# Patient Record
Sex: FEMALE | Race: WHITE | HISPANIC OR LATINO | Employment: FULL TIME | URBAN - METROPOLITAN AREA
[De-identification: names, ages, dates, MRNs, and addresses within clinical notes are randomized per-mention and may not be internally consistent; named-entity substitution may affect disease eponyms.]

---

## 2017-01-06 LAB — HBA1C MFR BLD HPLC: 5.7 %

## 2017-06-29 ENCOUNTER — GENERIC CONVERSION - ENCOUNTER (OUTPATIENT)
Dept: OTHER | Facility: OTHER | Age: 57
End: 2017-06-29

## 2017-09-19 ENCOUNTER — TRANSCRIBE ORDERS (OUTPATIENT)
Dept: ADMINISTRATIVE | Facility: HOSPITAL | Age: 57
End: 2017-09-19

## 2017-09-19 DIAGNOSIS — Z12.31 ENCOUNTER FOR SCREENING MAMMOGRAM FOR MALIGNANT NEOPLASM OF BREAST: ICD-10-CM

## 2017-09-19 DIAGNOSIS — Z12.31 VISIT FOR SCREENING MAMMOGRAM: Primary | ICD-10-CM

## 2017-09-19 LAB — HBA1C MFR BLD HPLC: 5.4 %

## 2017-11-06 ENCOUNTER — ALLSCRIPTS OFFICE VISIT (OUTPATIENT)
Dept: OTHER | Facility: OTHER | Age: 57
End: 2017-11-06

## 2017-11-06 DIAGNOSIS — Z12.31 ENCOUNTER FOR SCREENING MAMMOGRAM FOR MALIGNANT NEOPLASM OF BREAST: ICD-10-CM

## 2017-11-06 PROCEDURE — 87624 HPV HI-RISK TYP POOLED RSLT: CPT | Performed by: OBSTETRICS & GYNECOLOGY

## 2017-11-06 PROCEDURE — G0145 SCR C/V CYTO,THINLAYER,RESCR: HCPCS | Performed by: OBSTETRICS & GYNECOLOGY

## 2017-11-07 NOTE — PROGRESS NOTES
Assessment  1  Encounter for screening mammogram for malignant neoplasm of breast (V76 12)   (Z12 31)   2  Visit for routine gyn exam (V72 31) (Z01 419)   3  Encounter for Pap smear of cervix with HPV DNA cotesting (V76 2) (Z12 4)    Plan  Encounter for screening mammogram for malignant neoplasm of breast    · * MAMMO SCREENING BILATERAL W CAD; Status:Hold For - Scheduling; Requested  PWX:[id number];    Perform:[facility]; YVR:[id number]; Ordered;For:Encounter for screening mammogram for malignant neoplasm of breast; Ordered By:Franny Babb;    Discussion/Summary  HPV and Pap Co-testing Done Today Breast cancer screening: the risks and benefits of breast cancer screening were discussed, self breast exam technique was taught, monthly self breast exam was advised, mammogram is current and mammogram has been ordered  Colorectal cancer screening: the risks and benefits of colorectal cancer screening were discussed, colorectal cancer screening is current and colonoscopy is needed every ten years  Advice and education were given regarding aerobic exercise and weight bearing exercise  Regular menstrual cycles-I recommended that she keep a careful menstrual calendar, she should call with any midcycle bleeding, prolonged cycles, or shortened cycles  and HPV done today  reviewed with her and prescription given for next week  The patient has the current Goals: See discussion  The patent has the current Barriers: None  Patient is able to Self-Care  Chief Complaint  1  Visit For: Preventive General Multisystem Exam    History of Present Illness  HPI: Patient here for yearly, she had a normal mammogram in July of 2016, is due for mammogram now, this is scheduled for next week, she is due for a Pap, she had a normal Pap and negative HPV in November of 2012  menses, she bleeds for 5-7 days, her flow has been the same  No hot flashes  just became a grandmother on Friday   GYN HM, Adult Female Salbador Brooms:  The patient is being seen for a health maintenance evaluation  The last health maintenance visit was 1 year(s) ago  General Health: The patient's health since the last visit is described as good  Lifestyle:  She has weight concerns  -- She does not exercise regularly  -- She does not use tobacco    Reproductive health: the patient is premenopausal--   she reports no menstrual problems  -- she is not sexually active  -- pregnancy history: P 1    Screening:      Review of Systems    Constitutional: No fever, no chills, feels well, no tiredness, no recent weight gain or loss  ENT: no ear ache, no loss of hearing, no nosebleeds or nasal discharge, no sore throat or hoarseness  Cardiovascular: no complaints of slow or fast heart rate, no chest pain, no palpitations, no leg claudication or lower extremity edema  Respiratory: no complaints of shortness of breath, no wheezing, no dyspnea on exertion, no orthopnea or PND  Breasts: no complaints of breast pain, breast lump or nipple discharge  Gastrointestinal: no complaints of abdominal pain, no constipation, no nausea or diarrhea, no vomiting, no bloody stools  Genitourinary: no complaints of dysuria, no incontinence, no pelvic pain, no dysmenorrhea, no vaginal discharge or abnormal vaginal bleeding  Musculoskeletal: no complaints of arthralgia, no myalgia, no joint swelling or stiffness, no limb pain or swelling  Integumentary: no complaints of skin rash or lesion, no itching or dry skin, no skin wounds  Neurological: no complaints of headache, no confusion, no numbness or tingling, no dizziness or fainting  Active Problems  1  Abnormal neurological exam (781 99) (R29 90)   2  Carpal tunnel syndrome of right wrist (354 0) (G56 01)   3  Colonoscopy (Fiberoptic) Screening   4  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)   5  Contracture of right hamstring (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 89) (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)   6  Diabetes mellitus type II, controlled (250 00) (E11 9)   7   Encounter for screening mammogram for malignant neoplasm of breast (6 12)   (Z12 31)   8  Essential hypertriglyceridemia (272 1) (E78 1)   9  Exposure to hepatitis C (V01 79) (Z20 5)   10  Flu vaccine need (V04 81) (Z23)   11  History of hypertension (V12 59) (Z86 79)   12  History of hypothyroidism (V12 29) (Z86 39)   13  Hypovitaminosis D (268 9) (E55 9)   14  Low back pain with sciatica (724 3) (M54 40)   15  Low back strain (847 2) (S39 012A)   16  Need for 23-polyvalent pneumococcal polysaccharide vaccine (V03 82) (Z23)   17  Need for diphtheria-tetanus-pertussis (Tdap) vaccine, adult/adolescent (V06 1) (Z23)   18  Denied: History of Patient Education - Self-Examination Of Breasts   19  Right hand paresthesia (782 0) (R20 2)   20  Sacroiliitis (720 2) (M46 1)   21  Screening mammogram, encounter for (6 12) (Z12 31)   22  Sore muscles (729 1) (M79 1)   23  Strain of iliopsoas muscle, left, initial encounter (843 8) (X56 039D)   24  Strain of iliopsoas muscle, right, initial encounter (843 8) (S76 911A)   25  Visit for routine gyn exam ( 31) (Z01 419)   26  Work related injury (959 9) (Y99 0)    Past Medical History   · History of Anxiety (300 00) (F41 9)   · History of Birth History   · History of asthma (V12 69) (Z87 09)   · History of diabetes mellitus (V12 29) (Z86 39)   · History of hypertension (V12 59) (Z86 79)   · History of psoriasis (V13 3) (Z87 2)   · History of thyroid disease (V12 29) (Z86 39)   · History of varicose veins (V12 59) (Z86 79)   · History of Otitis media, left (382 9) (H66 92)   · History of Phlebitis of leg (451 2) (I80 3)   · History of Thyroid trouble (246 9) (E07 9)    The active problems and past medical history were reviewed and updated today        Surgical History   · History of  Section Low Transverse   · History of Complete Colonoscopy   · History of Gallbladder Surgery   · History of Laparoscopy With Excision Of Ectopic Pregnancy   · Denied: History of Patient Education - Self-Examination Of Breasts   · History of Thyroid Surgery    The surgical history was reviewed and updated today  Family History  Mother    · Family history of Asthma (V17 5)  Father    · Family history of Diabetes Mellitus (V18 0)   · Family history of Hypertension (V17 49)  Family History    · Family history of hypertension (V17 49) (Z82 49)   · Family history of neuropathy (V17 2) (Z82 0)   · Family history of stroke (V17 1) (Z82 3)   · Family history of thyroid disease (V18 19) (Z83 49)    The family history was reviewed and updated today  Social History   · Being A Social Drinker   · Caffeine Use   · Current every day smoker (305 1) (F17 200)   · Marital History -    · No drug use   · Occupation   · worked for [employer] for 19 years  Prior back injury 1999, treated with LESI and RTW  · One child   · [address] ([place] [address])   · Uses [address]  The social history was reviewed and updated today  Current Meds   1  Lisinopril 20 MG Oral Tablet; take 1 tablet by mouth once daily; Therapy: 57YGV6167 to (Evaluate:06Mcn6645) Recorded   2  MetFORMIN HCl - 500 MG Oral Tablet; 1 tab po tid; Therapy: 32KHP5036 to Recorded   3  Unithroid 150 MCG Oral Tablet; TAKE 1 TABLET DAILY; Therapy: 64FPP7533 to (Evaluate:27Jun2014) Recorded   4  Vitamin D 1000 UNIT Oral Tablet; 1 tab po q day; Therapy: (Recorded:01Jun2015) to Recorded    Allergies  1  Tapazole TABS    Vitals   Recorded: 48YZZ3059 32:29IC   Systolic 677, LUE, Sitting   Diastolic 78, LUE, Sitting   Height 5 ft 1 in   Weight 184 lb 8 oz   BMI Calculated 34 86   BSA Calculated 1 83   LMP POSTMENOPAUSAL     Physical Exam    Constitutional   General appearance: No acute distress, well appearing and well nourished  Neck   Thyroid: Normal, no thyromegaly  Pulmonary   Auscultation of lungs: Clear to auscultation      Cardiovascular   Auscultation of heart: Normal rate and rhythm, normal S1 and S2, no murmurs  Genitourinary   External genitalia: Normal and no lesions appreciated  Vagina: Normal, no lesions or dryness appreciated  Urethra: Normal     Urethral meatus: Normal     Bladder: Normal, soft, non-tender and no prolapse or masses appreciated  Cervix: Normal, no palpable masses  Uterus: Normal, non-tender, not enlarged, and no palpable masses  Adnexa/parametria: Normal, non-tender and no fullness or masses appreciated  Anus, perineum, and rectum: Normal sphincter tone, no masses, and no prolapse  Chest   Breasts: Normal and no dimpling or skin changes noted  Abdomen   Abdomen: Normal, non-tender, and no organomegaly noted  Liver and spleen: No hepatomegaly or splenomegaly  Examination for hernias: No hernias appreciated  Psychiatric   Orientation to person, place, and time: Normal     Mood and affect: Normal        Signatures   Electronically signed by :  Pierre Isaacs DO; Nov 6 2017  1:43PM EST                       (Author)

## 2017-11-08 ENCOUNTER — LAB REQUISITION (OUTPATIENT)
Dept: LAB | Facility: HOSPITAL | Age: 57
End: 2017-11-08
Payer: COMMERCIAL

## 2017-11-08 DIAGNOSIS — Z12.4 ENCOUNTER FOR SCREENING FOR MALIGNANT NEOPLASM OF CERVIX: ICD-10-CM

## 2017-11-08 DIAGNOSIS — Z11.51 ENCOUNTER FOR SCREENING FOR HUMAN PAPILLOMAVIRUS (HPV): ICD-10-CM

## 2017-11-13 LAB — HPV RRNA GENITAL QL NAA+PROBE: NORMAL

## 2017-11-15 LAB
LAB AP GYN PRIMARY INTERPRETATION: NORMAL
Lab: NORMAL
PATH INTERP SPEC-IMP: NORMAL

## 2017-12-18 ENCOUNTER — ALLSCRIPTS OFFICE VISIT (OUTPATIENT)
Dept: OTHER | Facility: OTHER | Age: 57
End: 2017-12-18

## 2017-12-19 NOTE — PROGRESS NOTES
Assessment  1  Acute bacterial bronchitis (466 0,041 9) (J20 8,B96 89)   2  Diabetes mellitus type II, controlled (250 00) (E11 9)   3  Nicotine abuse (305 1) (Z72 0)    Plan  Acute bacterial bronchitis    · Amoxicillin-Pot Clavulanate 875-125 MG Oral Tablet; 1 tab PO BID x 7 days   · Promethazine-Codeine 6 25-10 MG/5ML Oral Syrup; 1 tsp every 8 hours as neededfor cough  Essential hypertriglyceridemia    · Fenofibrate Micronized 200 MG Oral Capsule; 1 tab po q day  Nicotine abuse    · Chantix Starting Month Latrell 0 5 MG X 11 & 1 MG X 42 Oral Tablet; TAKE ASDIRECTED PER PACKAGE INSTRUCTIONS   · Dulera 100-5 MCG/ACT Inhalation Aerosol; inhale 1 puffs,  by mouth, twice daily    Discussion/Summary    Acute bacterial bronchitis - start Augmentin bid x 7 days, Promethazine with codeine will also be called in, [facility] sample given and urged to rinse mouth after use, urged to con't decongestant/lozenges/rest/fluids/gargles, d/w pt that cough can last 4 wks but to call with new/worsening symptoms or if not better at all by end of apptDM type 2 - will get records and recent BW from previous PCP before she re-establishes her in Jan - con't current meds for nowNicotine abuse - try a trial of Chantix  The patient was counseled regarding instructions for management,-- risk factor reductions,-- prognosis,-- patient and family education,-- impressions,-- risks and benefits of treatment options,-- importance of compliance with treatment  Chief Complaint  sick visit      History of Present Illness  HPI: Pt here with 5 days of cold symptoms  Symptoms started with congestion/ST/cough  She notes some chills but has not checked her temp  She has had SOB/wheezing with the cough and is having a hard time sleeping d/t the cough  She notes no ear pain but has had sinus pain and pressure and a lot of post nasal drip  She notes some diarrhea but no N/V/rashes  She is having incontinence to urine with coughing   She has tried Nyquil/Evelina [place] Cold and Vicks Vapor rub  She has had sick contacts  She is DM but does not check her sugars at home  She has been following with a different PCP since her insurance changed  She has a new insurance again and is going to re-establish with us next month  She is still taking Metformin as directed  She is still smoking and smokes about 7-8 cig a day  She has not been smoking as much recently  She wants to quit and has only tried cold turkey - successful for 8 yrs  She has currently been smoking again since 2014  Review of Systems   Constitutional: feeling poorly,-- chills-- and-- feeling tired, but-- no fever  ENT: sore throat-- and-- nasal discharge, but-- no earache  Cardiovascular: no chest pain-- and-- no palpitations  Respiratory: shortness of breath,-- cough-- and-- wheezing  Gastrointestinal: no nausea,-- no vomiting-- and-- no diarrhea  Genitourinary: incontinence, but-- no dysuria  Integumentary: no rashes  Neurological: headache, but-- no dizziness  Active Problems  1  Abnormal neurological exam (781 99) (R29 90)   2  Carpal tunnel syndrome of right wrist (354 0) (G56 01)   3  Cervical cancer screening (V76 2) (Z12 4)   4  Cervical cancer screening (V76 2) (Z12 4)   5  Colonoscopy (Fiberoptic) Screening   6  Diabetes mellitus type II, controlled (250 00) (E11 9)   7  Encounter for Pap smear of cervix with HPV DNA cotesting (V76 2) (Z12 4)   8  Encounter for screening mammogram for malignant neoplasm of breast (V76 12) (Z12 31)   9  Essential hypertriglyceridemia (272 1) (E78 1)   10  Exposure to hepatitis C (V01 79) (Z20 5)   11  Flu vaccine need (V04 81) (Z23)   12  History of hypertension (V12 59) (Z86 79)   13  History of hypothyroidism (V12 29) (Z86 39)   14  Hypovitaminosis D (268 9) (E55 9)   15  Low back pain with sciatica (724 3) (M54 40)   16  Denied: History of Patient Education - Self-Examination Of Breasts   17  Right hand paresthesia (782 0) (R20 2)   18   Sacroiliitis (720 2) (M46 1)   19  Screening for HPV (human papillomavirus) (V73 81) (Z11 51)   20  Screening mammogram, encounter for (V76 12) (Z12 31)   21  Visit for routine gyn exam (V72 31) (Z01 419)    Past Medical History  Active Problems And Past Medical History Reviewed: The active problems and past medical history were reviewed and updated today  Surgical History  Surgical History Reviewed: The surgical history was reviewed and updated today  Social History   · Being A Social Drinker   · Caffeine Use   · Current every day smoker (305 1) (F17 200)   · Marital History -    · No drug use   · Occupation   · worked for [employer] for 19 years  Prior back injury 1999, treated with LESI and RTW  · One child   · [address] ([place]   · [address]ses [address]  The social history was reviewed and updated today  Family History  Family History Reviewed: The family history was reviewed and updated today  Current Meds   1  Levothyroxine Sodium 200 MCG Oral Tablet; take 1 tablet by mouth every day; Therapy: 95EVR9782 to (Evaluate:02Vvq4470) Recorded   2  Lisinopril 20 MG Oral Tablet; take 1 tablet by mouth once daily; Therapy: 55ERN4758 to (Evaluate:05Pgu3476) Recorded   3  MetFORMIN HCl - 500 MG Oral Tablet; 1 tab po tid; Therapy: 09EMD4849 to Recorded   4  Unithroid 150 MCG Oral Tablet; TAKE 1 TABLET DAILY; Therapy: 29IMS1125 to (Evaluate:27Jun2014) Recorded   5  Vitamin D 1000 UNIT Oral Tablet; 1 tab po q day; Therapy: (Recorded:01Jun2015) to Recorded    The medication list was reviewed and updated today  Allergies  1  Tapazole TABS    Vitals   Recorded: 06ITM4566 02:54PM   Temperature 98 8 F   Heart Rate 88   Systolic 522   Diastolic 88   Height 5 ft 1 in   Weight 178 lb    BMI Calculated 33 63   BSA Calculated 1 8     Physical Exam   Constitutional  General appearance: No acute distress, well appearing and well nourished     Ears, Nose, Mouth, and Throat  External inspection of ears and nose: Normal    Otoscopic examination: Tympanic membranes translucent with normal light reflex  Canals patent without erythema  Oropharynx: Normal with no erythema, edema, exudate or lesions  Pulmonary  Respiratory effort: No increased work of breathing or signs of respiratory distress  Auscultation of lungs: Abnormal  -- diffuse wheezing and rhonchi L>R  Cardiovascular  Auscultation of heart: Normal rate and rhythm, normal S1 and S2, without murmurs  Abdomen  Abdomen: Non-tender, no masses  Lymphatic  Palpation of lymph nodes in neck: Abnormal  -- B/L ant cervical adenopathy    Psychiatric  Mood and affect: Normal          Future Appointments    Date/Time Provider Specialty Site   01/25/2018 04:00 PM Koby Cook DO Internal Medicine Pal Garcia MD     Signatures   Electronically signed by : Evelyn Gonzalez DO; Dec 18 2017  3:21PM EST                       (Author)

## 2018-01-02 ENCOUNTER — GENERIC CONVERSION - ENCOUNTER (OUTPATIENT)
Dept: OTHER | Facility: OTHER | Age: 58
End: 2018-01-02

## 2018-01-11 NOTE — PROGRESS NOTES
Assessment    1  Low back pain with sciatica (724 3) (M54 40)     Right wrist pain without trauma  Vit D deficiency may or may not be causing "slow to recover " status  Currently receiving mini-nebs for URI  Plan  Low back pain with sciatica    · Follow-up Visit in 4 Weeks Evaluation and Treatment  Follow-up  Status: Hold For -  Scheduling  Requested for: 26SDY6074  Low back pain with sciatica, Low back strain    · Cyclobenzaprine HCl - 5 MG Oral Tablet; TAKE 1 TABLET 3 TIMES DAILY AS  NEEDED    Discussion/Summary  Impression: low back pain and lumbar strain  Currently, the condition is unchanged  Medication changes are as documented in orders  do not start gabapentin Treatment plan includes physical therapy  Patient discussion: discussed with the patient  See your PCP for further care of your chest cold  See PCP for evaluation of right wrist pain  Chief Complaint  RUE numbness and tingling, and LBP, axial and buttock pain   consultation from Dr Pradeep Hoffman  Pt a no show 1/21/16    3/25 follow up for LBP      History of Present Illness  2nd visit   did not go to PT, did not start gabapentin   afraid of side effects   now has URI   w/c injury is low back   on further direct questioning right hand numbness occurred while of work  Last worked date of injury   reports lite duty not available   pattern today is diffuse lumbar with radiation into right buttock  3/25 finally had PT IE 3/22, yesterday sent home for severe URI   last PT was in January or February  Pain is right LB  New c/o right medial wrist pain   no injury, no numbness ( had reported numbness to Dr Pradeep Hoffman  New Hx is PT pt  fell onto buttocks going down stairs, reported it to 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)   5  Contracture of right hamstring (728 89) (M62 451)   6  Diabetes mellitus type II, controlled (250 00) (E11 9)   7  Encounter for screening mammogram for malignant neoplasm of breast (V76 12)   (Z12 31)   8  Essential hypertriglyceridemia (272 1) (E78 1)   9  Exposure to hepatitis C (V01 79) (Z20 5)   10  Flu vaccine need (V04 81) (Z23)   11  History of hypertension (V12 59) (Z86 79)   12  History of hypothyroidism (V12 29) (Z86 39)   13  Hypovitaminosis D (268 9) (E55 9)   14  Low back pain with sciatica (724 3) (M54 40)   15  Low back strain (847 2) (S39 012A)   16  Myalgia and myositis (729 1) (M79 1,M60 9)   17  Need for 23-polyvalent pneumococcal polysaccharide vaccine (V03 82) (Z23)   18  Need for diphtheria-tetanus-pertussis (Tdap) vaccine, adult/adolescent (V06 1) (Z23)   19  Denied: History of Patient Education - Self-Examination Of Breasts   20  Right hand paresthesia (782 0) (R20 2)   21  Sacroiliitis (720 2) (M46 1)   22  Strain of iliopsoas muscle, left, initial encounter (843 8) (S76 912A)   23  Strain of iliopsoas muscle, right, initial encounter (843 8) (S76 911A)   24  Visit for routine gyn exam (V72 31) (Z01 419)   25  Work related injury (959 9) (Y99 0)    Past Medical History    1  History of Anxiety (300 00) (F41 9)   2  History of Birth History   3  History of asthma (V12 69) (Z87 09)   4  History of diabetes mellitus (V12 29) (Z86 39)   5  History of hypertension (V12 59) (Z86 79)   6  History of psoriasis (V13 3) (Z87 2)   7  History of thyroid disease (V12 29) (Z86 39)   8  History of varicose veins (V12 59) (Z86 79)   9   History of Otitis media, left (382 9) (H66 92)   10  History of Phlebitis of leg (451 2) (I80 3)   11  History of Thyroid trouble (246 9) (E07 9)    The active problems and past medical history were reviewed and updated today  Surgical History    1  History of  Section Low Transverse   2  History of Complete Colonoscopy   3  History of Gallbladder Surgery   4  History of Laparoscopy With Excision Of Ectopic Pregnancy   5  Denied: History of Patient Education - Self-Examination Of Breasts   6  History of Thyroid Surgery    Family History    1  Family history of Asthma (V17 5)    2  Family history of Diabetes Mellitus (V18 0)   3  Family history of Hypertension (V17 49)    4  Family history of hypertension (V17 49) (Z82 49)   5  Family history of neuropathy (V17 2) (Z82 0)   6  Family history of stroke (V17 1) (Z82 3)   7  Family history of thyroid disease (V18 19) (Z80 46)    Social History    · Being A Social Drinker   · Caffeine Use   · Current every day smoker (305 1) (F17 200)   · Marital History -    · No drug use   · Occupation   · One child   · [address] ([address]   · Uses [employer] Equipment - [employer]    Current Meds   1  Ergocalciferol 99098 UNIT Oral Capsule; TAKE 1 CAPSULE WEEKLY; Therapy: 01LJK9887 to (Last Rx:39Cmv2575)  Requested for: 2016 Ordered   2  Flexeril TABS; Therapy: (Recorded:2016) to Recorded   3  Gabapentin 100 MG Oral Capsule; TAKE 1 CAPSULE  IN THE MORNING AND 2   CAPSULES IN THE EVENING; Therapy: 69RJR7588 to (Javid Mckinley)  Requested for: 02WMU9567; Last   Rx:2016 Ordered   4  Levoxyl 150 MCG Oral Tablet; Therapy: (Arvil Milder) to Recorded   5  Lisinopril 20 MG Oral Tablet; take 1 tablet by mouth once daily; Therapy: 39BOR5844 to (Evaluate:44Jwr5134) Recorded   6  Meloxicam 15 MG Oral Tablet; TAKE 1 TABLET DAILY AS NEEDED;    Therapy: 10Scy7634 to ([id number])  Requested for: 54RKO0020; Last   Rx:09Sep2015 Ordered   7  MetFORMIN HCl - 500 MG Oral Tablet; 1 tab po tid; Therapy: 07NRY0301 to Recorded   8  OxyCODONE HCl TABS; Therapy: (Brian Redd) to Recorded   9  PredniSONE 5 MG Oral Tablet; Take as directed according to titration schedule given; Therapy: 00LUL8276 to (Evaluate:04Jan2016)  Requested for: 29Dec2015; Last   Rx:29Dec2015 Ordered   10  Unithroid 150 MCG Oral Tablet; TAKE 1 TABLET DAILY; Therapy: 19OPY4023 to (Evaluate:27Jun2014) Recorded   11  Vitamin D 1000 UNIT Oral Tablet; 1 tab po q day; Therapy: (Recorded:01Jun2015) to Recorded    Allergies    1  Tapazole TABS    Vitals  Signs [Data Includes: Current Encounter]   Recorded: S006601 11:33AM   Heart Rate: 72  Systolic: 423  Diastolic: 82  Height: 5 ft 3 in  Weight: 171 lb   BMI Calculated: 30 29  BSA Calculated: 1 81    Physical Exam        Lumbar/Sacral Spine examination demonstrates  uses UE to help transfer but I  reflexes intact and symmetric, + PPT right SI and upper gluteals, lower lumbar PVM  Results/Data    Diagnostic Review PT note reviewed from yesterday they found palpatory tenderness and impaired ROM        Future Appointments    Date/Time Provider Specialty Site   04/25/2016 09:30 AM Gisell Wallace DO Obstetrics/Gynecology [facility]   06/02/2016 04:00 PM Thomas Cardona DO Internal Medicine Tiffanie Babb MD     Signatures   Electronically signed by : Tim Ulloa DO; Mar 25 2016 11:58AM EST                       (Author)

## 2018-01-12 NOTE — MISCELLANEOUS
Provider Comments  Provider Comments:   No show for 40 min IE        Signatures   Electronically signed by : Yary Michaels DO; Jan 21 2016 11:19AM EST                       (Author)

## 2018-01-13 VITALS
WEIGHT: 184.5 LBS | DIASTOLIC BLOOD PRESSURE: 78 MMHG | HEIGHT: 61 IN | SYSTOLIC BLOOD PRESSURE: 124 MMHG | BODY MASS INDEX: 34.83 KG/M2

## 2018-01-14 NOTE — MISCELLANEOUS
Message  Return to work or school:   [address] Tom Mcknight is under my professional care  She was seen in my office on 4/22   She is able to return to work on  as scheduled      Same guidelines as outlined on 4/11/16 report, advise keep hours to usual 8 hours actualy working  Signatures   Electronically signed by : Kelli Jason DO;  Apr 22 2016 12:26PM EST                       (Author)

## 2018-01-15 NOTE — RESULT NOTES
Verified Results  (1) C-REACTIVE PROTEIN [id number] 03:03PM Abel Davila Order Number: [id number]     Test Name Result Flag Reference   C-REACT PROTEIN <3 0 mg/L  <3 0     (1) VITAMIN D 25-HYDROXY 49OCG0124 03:03PM [facility] Order Number: [id number]     Order Number: [id number]     Test Name Result Flag Reference   VIT D 25-HYDROX 20 5 ng/mL L 30 0-100 0

## 2018-01-15 NOTE — RESULT NOTES
Verified Results  (1) CK (CPK) [id number] 03:03PM Ron Bang Order Number: [id number]     Test Name Result Flag Reference   CK (CPK) 54 U/L

## 2018-01-16 NOTE — RESULT NOTES
Verified Results  (1) VITAMIN D 25-HYDROXY 27Apr2016 02:14PM Lucila CISNEROS Order Number: [id number]    TW Order Number: [id number]     Test Name Result Flag Reference   VIT D 25-HYDROX 37 7 ng/mL  30 0-100 0

## 2018-01-16 NOTE — RESULT NOTES
Verified Results  (1) CYCLIC CITRULLINATED PEPTIDE [id number] 03:03PM Tex Whittaker Order Number: [id number]    Performed at:  [address]  [id number]  : Melissa Jones MD, Phone:  [phone]     Test Name Result Flag Reference   CYCLIC CITRULLINATED PEPTIDE 11 units  0 - 19   Negative               <20                          Weak positive      20 - 39                          Moderate positive  40 - 59                          Strong positive        >59

## 2018-01-16 NOTE — PROGRESS NOTES
Assessment    1  Low back strain (847 2) (S39 012A)   2  Hypovitaminosis D (268 9) (E55 9)    Plan  Hypovitaminosis D, Myalgia and myositis    · Ergocalciferol 09585 UNIT Oral Capsule; TAKE 1 CAPSULE WEEKLY  Low back pain with sciatica, Low back strain    · Follow-up Visit in 4 Weeks Evaluation and Treatment  Follow-up  Status: Hold For -  Scheduling  Requested for: 93Wkz7868    Discussion/Summary  Impression: lumbar strain and slow to recover  Currently, the condition is unchanged  Medication changes are as documented in orders  workman's comp will likely NOT cover Vit D DO NOT start gabapentin at this time  Treatment plan includes physical therapy  Patient discussion: discussed with the patient  Schedule Physical Therapy as soon as you are over your cold  Chief Complaint  RUE numbness and tingling, and LBP, axial and buttock pain   consultation from Dr Neeta Mi  Pt a no show 1/21/16  History of Present Illness  2nd visit   did not go to PT, did not start gabapentin   afraid of side effects   now has URI   w/c injury is low back   on further direct questioning right hand numbness occurred while of work  Last worked date of injury   reports lite duty not available   pattern today is diffuse lumbar with radiation into right buttock  Active Problems    1  Abnormal neurological exam (781 99) (R29 90)   2  Carpal tunnel syndrome of right wrist (354 0) (G56 01)   3  Colonoscopy (Fiberoptic) Screening   4  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)   5  Contracture of right hamstring (728 89) (M62 451)   6  Diabetes mellitus type II, controlled (250 00) (E11 9)   7  Encounter for screening mammogram for malignant neoplasm of breast (V76 12)   (Z12 31)   8  Essential hypertriglyceridemia (272 1) (E78 1)   9  Exposure to hepatitis C (V01 79) (Z20 5)   10  Flu vaccine need (V04 81) (Z23)   11  History of hypertension (V12 59) (Z86 79)   12  History of hypothyroidism (V12 29) (Z86 39)   13   Low back pain with sciatica (724 3) (M54 40)   14  Low back strain (847 2) (S39 012A)   15  Myalgia and myositis (729 1) (M79 1,M60 9)   16  Need for 23-polyvalent pneumococcal polysaccharide vaccine (V03 82) (Z23)   17  Need for diphtheria-tetanus-pertussis (Tdap) vaccine, adult/adolescent (V06 1) (Z23)   18  Denied: History of Patient Education - Self-Examination Of Breasts   19  Right hand paresthesia (782 0) (R20 2)   20  Sacroiliitis (720 2) (M46 1)   21  Strain of iliopsoas muscle, left, initial encounter (843 8) (S76 912A)   22  Strain of iliopsoas muscle, right, initial encounter (843 8) (S76 911A)   23  Visit for routine gyn exam (V72 31) (Z01 419)   24  Work related injury (959 9) (Y99 0)    Past Medical History    1  History of Anxiety (300 00) (F41 9)   2  History of Birth History   3  History of asthma (V12 69) (Z87 09)   4  History of diabetes mellitus (V12 29) (Z86 39)   5  History of hypertension (V12 59) (Z86 79)   6  History of psoriasis (V13 3) (Z87 2)   7  History of thyroid disease (V12 29) (Z86 39)   8  History of varicose veins (V12 59) (Z86 79)   9  History of Otitis media, left (382 9) (H66 92)   10  History of Phlebitis of leg (451 2) (I80 3)   11  History of Thyroid trouble (246 9) (E07 9)    Surgical History    1  History of  Section Low Transverse   2  History of Complete Colonoscopy   3  History of Gallbladder Surgery   4  History of Laparoscopy With Excision Of Ectopic Pregnancy   5  Denied: History of Patient Education - Self-Examination Of Breasts   6  History of Thyroid Surgery    The surgical history was reviewed and updated today  Family History    1  Family history of Asthma (V17 5)    2  Family history of Diabetes Mellitus (V18 0)   3  Family history of Hypertension (V17 49)    4  Family history of hypertension (V17 49) (Z82 49)   5  Family history of neuropathy (V17 2) (Z82 0)   6  Family history of stroke (V17 1) (Z82 3)   7   Family history of thyroid disease (V18 19) (Z83 49)    The family history was reviewed and updated today  Social History    · Being A Social Drinker   · Caffeine Use   · Current every day smoker (305 1) (F17 200)   · Marital History -    · No drug use   · Occupation   · One child   · [address] ([place] [address])   · [address]  The social history was reviewed and is unchanged  Current Meds   1  Flexeril TABS; Therapy: (Recorded:29Jan2016) to Recorded   2  Gabapentin 100 MG Oral Capsule; TAKE 1 CAPSULE  IN THE MORNING AND 2   CAPSULES IN THE EVENING; Therapy: 83IYG2651 to (Chelsea Boxer)  Requested for: 63JKJ1309; Last   Rx:29Jan2016 Ordered   3  Levoxyl 150 MCG Oral Tablet; Therapy: (Cleophus Fuel) to Recorded   4  Lisinopril 20 MG Oral Tablet; take 1 tablet by mouth once daily; Therapy: 30NUG1350 to (Evaluate:05Feb2015) Recorded   5  Meloxicam 15 MG Oral Tablet; TAKE 1 TABLET DAILY AS NEEDED; Therapy: 02Sep2015 to (Delia Orona)  Requested for: 85RFO7444; Last   Rx:09Sep2015 Ordered   6  MetFORMIN HCl - 500 MG Oral Tablet; 1 tab po tid; Therapy: 59UBR8771 to Recorded   7  OxyCODONE HCl TABS; Therapy: (Cleophus Fuel) to Recorded   8  PredniSONE 5 MG Oral Tablet; Take as directed according to titration schedule given; Therapy: 57DNP0788 to (Evaluate:04Jan2016)  Requested for: 84Hqd2496; Last   Rx:35Jbj0790 Ordered   9  Unithroid 150 MCG Oral Tablet; TAKE 1 TABLET DAILY; Therapy: 97LZB7235 to (Evaluate:27Jun2014) Recorded   10  Vitamin D 1000 UNIT Oral Tablet; 1 tab po q day; Therapy: (Recorded:01Jun2015) to Recorded    The medication list was reviewed and updated today  Allergies    1   Tapazole TABS    Vitals  Signs [Data Includes: Current Encounter]   Recorded: [id number] 11:49AM   Heart Rate: 92  Systolic: 862  Diastolic: 94  Height: 5 ft 3 in  Weight: 171 lb   BMI Calculated: 30 29  BSA Calculated: 1 81    Physical Exam    Constitutional acutely ill w coryza and cough  Thoracic Spine examination demonstrates  reflexes equal but hypoactive  Kelsien Miquel Lafleur SLR neg on the left, weakly + on the right  Willy Lafleur I in transfers w/o focal or lateralizing weakness  + loss of lordosis  some palpatory tenderness of lumbar PVM  Results/Data    Diagnostic Review Vit D of 20 5 is ONLY abnormality found (pt  clearly presented as a possible DM with rash and myalgias) at first visit  Message    FARSHAD LIN is under my professional care  She was seen in my office on 2/26    She is not able to return to work until re-evaluation at next four week appointment             Future Appointments    Date/Time Provider Specialty Site   03/25/2016 11:20 AM Betina Meadows  [facility] [address] [place] [address]   06/02/2016 04:00 PM Stephanie Zaragoza DO Internal Medicine Vincent Hicks MD     Signatures   Electronically signed by : Rangel Toribio DO; Feb 26 2016  3:16PM EST                       (Author)

## 2018-01-17 NOTE — RESULT NOTES
Verified Results  (1) RHEUMATOID FACTOR SCREEN 72QRN8019 03:03PM [employer] Order Number: [id number]     Test Name Result Flag Reference   RHEUMATOID FACTOR Negative  Negative     (1) LYME ANTIBODY PROFILE W/REFLEX TO WESTERN BLOT 05PPL7189 03:03PM [employer] Order Number: [id number]     Test Name Result Flag Reference   LYME IGG 0 40  0 00-0 79   NEGATIVE(0 00-0 79)-Absence of detectable Borrelia IgG Antibodies  A negative result does not exclude the possibility of Borrelia infection  If early Lyme disease is suspected,a second sample should be collected & tested 4 weeks after initial testing  LYME IGM 0 20  0 00-0 79   NEGATIVE (0 00-0 79)-Absence of detectable Borrelia IgM antibodies  A negative result does not exclude the possibility of Borrelia infection  If early lyme disease is suspected, a second sample should be collected & tested 4 weeks after initial testing  (1) FARSHAD SCREEN, (INC  PATTERN IF INDICATED) 60YUI2990 03:03PM [employer] Order Number: [id number]     Test Name Result Flag Reference   FARSHAD SCREEN   Negative  Negative     (1) ALDOLASE 96NPA4588 03:03PM [employer] Order Number: [id number]    Performed at:  [address] [place] [address]  : Rosita Benton MD, Phone:  [phone]     Test Name Result Flag Reference   ALDOLASE 5 4 U/L  3 3 - 10 3

## 2018-01-18 NOTE — RESULT NOTES
Verified Results  (1) SED RATE 63RMM2801 03:37PM [facility]     Test Name Result Flag Reference   SED RATE 15 mm/hour  0-15

## 2018-01-22 VITALS
HEART RATE: 88 BPM | BODY MASS INDEX: 33.61 KG/M2 | TEMPERATURE: 98.8 F | HEIGHT: 61 IN | SYSTOLIC BLOOD PRESSURE: 148 MMHG | DIASTOLIC BLOOD PRESSURE: 88 MMHG | WEIGHT: 178 LBS

## 2018-01-23 NOTE — MISCELLANEOUS
Message   Recorded as Task   Date: 01/02/2018 02:14 PM, Created By: Jelena Scott   Task Name: Medical Complaint Callback   Assigned To: [facility],Team   Regarding Patient: RILEY, [address], Status: Active   Comment:    Jelena Scott - 02 Jan 2018 2:14 PM     TASK CREATED  Patient seen with bronchitis a couple weeks ago  Now having symptoms of vomiting, diarrhea, chills, body aches, head ache  No fever  Please advise  Idalia Weir - 02 Jan 2018 3:14 PM     TASK REASSIGNED: Previously Assigned To [facility]  Please adviseKaushik - 02 Jan 2018 3:31 PM     TASK REPLIED TO: Previously Assigned To Lucy Ortiz viral or even flu like - if symptoms have been present for > 48 hrs little benefit of flu medication and treatment is supportive - tx the cough with cough med and fever/body aches and headache with Tylenol - if not better has to be seen   Yamilex Coleman - 02 Jan 2018 8:44 PM     TASK EDITED  Patient aware        Active Problems    1  Abnormal neurological exam (781 99) (R29 90)   2  Acute bacterial bronchitis (466 0,041 9) (J20 8,B96 89)   3  Carpal tunnel syndrome of right wrist (354 0) (G56 01)   4  Cervical cancer screening (V76 2) (Z12 4)   5  Cervical cancer screening (V76 2) (Z12 4)   6  Colonoscopy (Fiberoptic) Screening   7  Diabetes mellitus type II, controlled (250 00) (E11 9)   8  Encounter for Pap smear of cervix with HPV DNA cotesting (V76 2) (Z12 4)   9  Encounter for screening mammogram for malignant neoplasm of breast (V76 12)   (Z12 31)   10  Essential hypertriglyceridemia (272 1) (E78 1)   11  Exposure to hepatitis C (V01 79) (Z20 5)   12  Flu vaccine need (V04 81) (Z23)   13  History of hypertension (V12 59) (Z86 79)   14  History of hypothyroidism (V12 29) (Z86 39)   15  Hypovitaminosis D (268 9) (E55 9)   16  Low back pain with sciatica (724 3) (M54 40)   17  Nicotine abuse (305 1) (Z72 0)   18   Denied: History of Patient Education - Self-Examination Of Breasts   19  Right hand paresthesia (782 0) (R20 2)   20  Sacroiliitis (720 2) (M46 1)   21  Screening for HPV (human papillomavirus) (V73 81) (Z11 51)   22  Screening mammogram, encounter for (V76 12) (Z12 31)   23  Visit for routine gyn exam (V72 31) (Z01 419)    Current Meds   1  Chantix Starting Month Latrell 0 5 MG X 11 & 1 MG X 42 Oral Tablet; TAKE AS DIRECTED   PER PACKAGE INSTRUCTIONS; Therapy: 01QFH9302 to (Last Rx:62Ktg2115)  Requested for: 24XFS7060 Ordered   2  Dulera 100-5 MCG/ACT Inhalation Aerosol; inhale 1 puffs,  by mouth, twice daily; Therapy: 33BOY0225 to (Last Rx:19Odp6387) Ordered   3  Fenofibrate Micronized 200 MG Oral Capsule; 1 tab po q day; Therapy: 20BRW7041 to (Evaluate:96Tfa8542)  Requested for: 26UFH7297; Last   Rx:64Nqm4102 Ordered   4  Levothyroxine Sodium 200 MCG Oral Tablet; take 1 tablet by mouth every day; Therapy: 87IUX0296 to (Evaluate:29Rmy6750) Recorded   5  Lisinopril 20 MG Oral Tablet; take 1 tablet by mouth once daily; Therapy: 76PIK2833 to (Evaluate:63Pjm4589) Recorded   6  MetFORMIN HCl - 500 MG Oral Tablet; 1 tab po tid; Therapy: 16PKM5170 to Recorded   7  Promethazine-Codeine 6 25-10 MG/5ML Oral Syrup; 1 tsp every 8 hours as needed for   cough; Therapy: 67IXV1537 to (Last Rx:95Qdr8967) Ordered   8  Unithroid 150 MCG Oral Tablet; TAKE 1 TABLET DAILY; Therapy: 14WOG4721 to (Evaluate:27Jun2014) Recorded   9  Vitamin D 1000 UNIT Oral Tablet; 1 tab po q day; Therapy: (Recorded:01Jun2015) to Recorded    Allergies    1   Tapazole TABS    Signatures   Electronically signed by : Martha Corado DO; Srinivas  3 2018  9:20PM EST                       (Author)

## 2018-01-25 ENCOUNTER — OFFICE VISIT (OUTPATIENT)
Dept: FAMILY MEDICINE CLINIC | Facility: HOSPITAL | Age: 58
End: 2018-01-25
Payer: COMMERCIAL

## 2018-01-25 VITALS
SYSTOLIC BLOOD PRESSURE: 182 MMHG | DIASTOLIC BLOOD PRESSURE: 72 MMHG | BODY MASS INDEX: 31.71 KG/M2 | WEIGHT: 179 LBS | TEMPERATURE: 99.2 F | HEART RATE: 82 BPM | HEIGHT: 63 IN

## 2018-01-25 DIAGNOSIS — J06.9 VIRAL UPPER RESPIRATORY TRACT INFECTION: Primary | ICD-10-CM

## 2018-01-25 DIAGNOSIS — E11.9 TYPE 2 DIABETES MELLITUS WITHOUT COMPLICATION, WITHOUT LONG-TERM CURRENT USE OF INSULIN (HCC): ICD-10-CM

## 2018-01-25 DIAGNOSIS — L40.9 PSORIASIS: ICD-10-CM

## 2018-01-25 PROCEDURE — 99214 OFFICE O/P EST MOD 30 MIN: CPT | Performed by: INTERNAL MEDICINE

## 2018-01-25 RX ORDER — FENOFIBRATE 200 MG/1
1 CAPSULE ORAL DAILY
COMMUNITY
Start: 2017-12-18 | End: 2019-02-21 | Stop reason: SDUPTHER

## 2018-01-25 RX ORDER — LEVOTHYROXINE SODIUM 0.15 MG/1
1 TABLET ORAL DAILY
COMMUNITY
Start: 2014-05-05 | End: 2018-05-07 | Stop reason: DRUGHIGH

## 2018-01-25 RX ORDER — FLUTICASONE PROPIONATE 50 MCG
2 SPRAY, SUSPENSION (ML) NASAL DAILY
Qty: 16 G | Refills: 1 | Status: SHIPPED | OUTPATIENT
Start: 2018-01-25 | End: 2018-05-07 | Stop reason: ALTCHOICE

## 2018-01-25 RX ORDER — METFORMIN HYDROCHLORIDE 500 MG/1
1500 TABLET, EXTENDED RELEASE ORAL
Qty: 90 TABLET | Refills: 0
Start: 2018-01-25 | End: 2018-10-22 | Stop reason: SDUPTHER

## 2018-01-25 RX ORDER — LISINOPRIL 20 MG/1
1 TABLET ORAL DAILY
COMMUNITY
Start: 2015-01-06 | End: 2018-10-22 | Stop reason: SDUPTHER

## 2018-01-25 RX ORDER — LEVOTHYROXINE SODIUM 0.2 MG/1
1 TABLET ORAL DAILY
COMMUNITY
Start: 2017-12-18 | End: 2019-02-25 | Stop reason: SDUPTHER

## 2018-01-25 RX ORDER — VARENICLINE TARTRATE 25 MG
1 KIT ORAL SEE ADMIN INSTRUCTIONS
COMMUNITY
Start: 2017-12-18 | End: 2019-03-28

## 2018-01-25 NOTE — ASSESSMENT & PLAN NOTE
Pt is following with Endo and had BW and appt in Nov - was told everything was good and f/u in 6 mos - will try and get copy of Bw results and bring in to us - thinks she has in her car - if not she will get us name of Endo so we can get copy of results, will try and get copy of eye exam from eye doc on [address]

## 2018-01-25 NOTE — PROGRESS NOTES
Assessment/Plan:    Controlled type 2 diabetes mellitus without complication, without long-term current use of insulin (HCC)  Pt is following with Endo and had BW and appt in Nov - was told everything was good and f/u in 6 mos - will try and get copy of Bw results and bring in to us - thinks she has in her car - if not she will get us name of Endo so we can get copy of results, will try and get copy of eye exam from eye doc on [facility]    Psoriasis  No current tx as had had great success with clinical study med in the past - was on it for 5 yrs but now off of it and plaques returning, will give number for Derm    Essential hypertriglyceridemia  On Fenofibrate - had been stopped last year but restarted a few mos ago - tolerating med, need to get copy of BW results from Oct - con't current regimen for now, watch diet and keep active    Hypovitaminosis D  Tolerating current supplement, need to get copy of BW results from Oct       Diagnoses and all orders for this visit:    Viral upper respiratory tract infection  Comments:  Encouraged to start OTC decongestant and not Benadryl, will send rx for Flonase, Delsym or Robitussan recommended as was rest/fluids/lozenges/hot tea and gargle  Orders:  -     fluticasone (FLONASE) 50 mcg/act nasal spray; 2 sprays into each nostril daily    Type 2 diabetes mellitus without complication, without long-term current use of insulin (HCC)  -     metFORMIN (GLUCOPHAGE-XR) 500 mg 24 hr tablet; Take 3 tablets by mouth daily with breakfast          Subjective:      Patient ID: Ira Bueno is a 56 y o  female  HPI  Pt here for re-establishing care  She had to change PCP recently d/t insurance coverage but insurance has changed and she is coming back to us  Pt with known DM Type 2 without complications since 0580  She has been well controlled on Metformin 500 mg ER 3 tab PO q day - as per Dr Ryan Castellano in [place]    She states she had labs in Oct and she is not sure what her A1C was but she states the Endocrinologist was happy and no med changes were made  She is on ACE but no statin  She follows with eye doc on [facility] - she is not aware of any retinopathy - thinks eye appt was summer 2017  She had her feet checked with Endo in Oct - she notes no neuropathy/sores on her feet  She has h/o psoriasis and has chronic psoriatic plaques  She had been in a study but had no improvement with the med  She has not seen Derm recently and is wishing to see one again  She is taking her Vit D daily  She had Vit D checked in Oct and states they told her it was good  She had her cholesterol checked in Oct and has had her fenofibrate restarted early 2017  She notes no SE with the medication  Early Jan she started with chills but no fever, congestion, and dry cough  She was home from work for a few days and symptoms seemed to improve until yesterday  Yesterday she started to note congestion and hoarseness but no St/ear pain/F but still having chills  She notes no SOB/wheezing/N/V/urinary symptoms/rashes  She is having some sinus pain and pressure  She is currently using Benadryl  She has an order for mammo    She saw GYN in Nov    She has had a colonoscopy    Review of Systems   Constitutional: Positive for chills  Negative for fever  HENT: Positive for congestion, sinus pressure and voice change  Negative for ear pain, sore throat and trouble swallowing  Eyes: Negative for discharge  Respiratory: Positive for cough  Negative for shortness of breath and stridor  Cardiovascular: Positive for chest pain  Negative for palpitations and leg swelling  Gastrointestinal: Negative for abdominal pain, diarrhea and nausea  Endocrine: Negative for cold intolerance and heat intolerance  Genitourinary: Negative for difficulty urinating and flank pain  Musculoskeletal: Negative for back pain and joint swelling  Skin: Negative for rash          Psoriatic plaques present Neurological: Negative for dizziness and headaches  Hematological: Positive for adenopathy  Psychiatric/Behavioral: Negative for agitation and dysphoric mood  Objective:     Physical Exam   Constitutional: She appears well-developed and well-nourished  No distress  HENT:   Right Ear: External ear normal    Left Ear: External ear normal    Mouth/Throat: No oropharyngeal exudate  Eyes: Right eye exhibits no discharge  Left eye exhibits no discharge  No scleral icterus  Cardiovascular: Normal rate and regular rhythm  No murmur heard  Pulmonary/Chest: Effort normal and breath sounds normal  No respiratory distress  She has no wheezes  She has no rales  Abdominal: Soft  She exhibits no distension  There is no tenderness  There is no guarding  Musculoskeletal: She exhibits no edema  Lymphadenopathy:     She has cervical adenopathy  Skin: Skin is warm and dry  Psoriatic plaques present   Psychiatric: She has a normal mood and affect   Judgment normal

## 2018-01-25 NOTE — ASSESSMENT & PLAN NOTE
On Fenofibrate - had been stopped last year but restarted a few mos ago - tolerating med, need to get copy of BW results from Oct - con't current regimen for now, watch diet and keep active

## 2018-01-25 NOTE — ASSESSMENT & PLAN NOTE
No current tx as had had great success with clinical study med in the past - was on it for 5 yrs but now off of it and plaques returning, will give number for Derm

## 2018-01-29 ENCOUNTER — TELEPHONE (OUTPATIENT)
Dept: FAMILY MEDICINE CLINIC | Facility: HOSPITAL | Age: 58
End: 2018-01-29

## 2018-01-29 DIAGNOSIS — J01.90 ACUTE NON-RECURRENT SINUSITIS, UNSPECIFIED LOCATION: Primary | ICD-10-CM

## 2018-01-29 RX ORDER — AMOXICILLIN AND CLAVULANATE POTASSIUM 875; 125 MG/1; MG/1
1 TABLET, FILM COATED ORAL EVERY 12 HOURS SCHEDULED
Qty: 14 TABLET | Refills: 0 | Status: SHIPPED | OUTPATIENT
Start: 2018-01-29 | End: 2018-02-05

## 2018-01-29 NOTE — TELEPHONE ENCOUNTER
Patient seen last week and stated her cough is not any better  Told to call us if it doesn't improve  Please advise

## 2018-03-06 ENCOUNTER — OFFICE VISIT (OUTPATIENT)
Dept: URGENT CARE | Facility: CLINIC | Age: 58
End: 2018-03-06
Payer: COMMERCIAL

## 2018-03-06 VITALS
OXYGEN SATURATION: 97 % | HEIGHT: 63 IN | DIASTOLIC BLOOD PRESSURE: 80 MMHG | RESPIRATION RATE: 18 BRPM | HEART RATE: 92 BPM | BODY MASS INDEX: 31.18 KG/M2 | WEIGHT: 176 LBS | SYSTOLIC BLOOD PRESSURE: 140 MMHG | TEMPERATURE: 98.2 F

## 2018-03-06 DIAGNOSIS — R06.2 WHEEZING: Primary | ICD-10-CM

## 2018-03-06 PROBLEM — J45.41 MODERATE PERSISTENT ASTHMA WITH ACUTE EXACERBATION: Status: ACTIVE | Noted: 2018-03-06

## 2018-03-06 PROCEDURE — 99213 OFFICE O/P EST LOW 20 MIN: CPT | Performed by: EMERGENCY MEDICINE

## 2018-03-06 RX ORDER — PREDNISONE 20 MG/1
40 TABLET ORAL DAILY
Qty: 10 TABLET | Refills: 0 | Status: SHIPPED | OUTPATIENT
Start: 2018-03-06 | End: 2018-03-11

## 2018-03-06 RX ORDER — ALBUTEROL SULFATE 90 UG/1
2 AEROSOL, METERED RESPIRATORY (INHALATION) EVERY 6 HOURS PRN
Qty: 1 INHALER | Refills: 0 | Status: SHIPPED | OUTPATIENT
Start: 2018-03-06 | End: 2019-03-08

## 2018-03-06 RX ORDER — ALBUTEROL SULFATE 2.5 MG/3ML
2.5 SOLUTION RESPIRATORY (INHALATION) ONCE
Status: COMPLETED | OUTPATIENT
Start: 2018-03-06 | End: 2018-03-06

## 2018-03-06 RX ORDER — IPRATROPIUM BROMIDE AND ALBUTEROL SULFATE 2.5; .5 MG/3ML; MG/3ML
3 SOLUTION RESPIRATORY (INHALATION)
Status: DISCONTINUED | OUTPATIENT
Start: 2018-03-06 | End: 2018-05-07

## 2018-03-06 RX ORDER — AZITHROMYCIN 250 MG/1
TABLET, FILM COATED ORAL
Qty: 6 TABLET | Refills: 0 | Status: SHIPPED | OUTPATIENT
Start: 2018-03-06 | End: 2018-03-10

## 2018-03-06 RX ADMIN — IPRATROPIUM BROMIDE AND ALBUTEROL SULFATE 3 ML: 2.5; .5 SOLUTION RESPIRATORY (INHALATION) at 17:01

## 2018-03-06 RX ADMIN — ALBUTEROL SULFATE 2.5 MG: 2.5 SOLUTION RESPIRATORY (INHALATION) at 17:33

## 2018-03-06 NOTE — PROGRESS NOTES
Assessment/Plan:    No problem-specific Assessment & Plan notes found for this encounter  Diagnoses and all orders for this visit:    Wheezing  -     ipratropium-albuterol (DUO-NEB) 0 5-2 5 mg/3 mL inhalation solution 3 mL; Take 3 mL by nebulization every 6 (six) hours           Subjective: asthmatic bronchitis     Patient ID: Vernita Bernheim is a 56 y o  female  Coughing, wheezing SOB for 2 days, no fever, + smoker but none since Sunday      Wheezing    This is a new problem  The current episode started yesterday  The problem occurs constantly  The problem has been gradually worsening  Associated symptoms include chest pain, coughing and shortness of breath  Pertinent negatives include no fever  Nothing aggravates the symptoms  She has tried nothing for the symptoms  The treatment provided no relief  The following portions of the patient's history were reviewed and updated as appropriate: current medications, past family history, past medical history, past social history, past surgical history and problem list     Review of Systems   Constitutional: Negative for fever  Respiratory: Positive for cough, shortness of breath and wheezing  Cardiovascular: Positive for chest pain  All other systems reviewed and are negative  Objective:      /80   Pulse 92   Temp 98 2 °F (36 8 °C)   Resp 18   Ht 5' 3" (1 6 m)   Wt 79 8 kg (176 lb)   SpO2 97%   BMI 31 18 kg/m²          Physical Exam   Constitutional: She is oriented to person, place, and time  She appears well-developed and well-nourished  HENT:   Right Ear: External ear normal    Left Ear: External ear normal    Nose: Nose normal    Mouth/Throat: Oropharynx is clear and moist    Eyes: Pupils are equal, round, and reactive to light  Neck: Normal range of motion  Pulmonary/Chest: She is in respiratory distress (mild tachypnea, O2 sat 94 - 95%)  She has wheezes (both bases)  Abdominal: Soft   Bowel sounds are normal  Neurological: She is alert and oriented to person, place, and time  Skin: Skin is warm and dry  Psychiatric: She has a normal mood and affect  Her behavior is normal  Judgment and thought content normal    Nursing note and vitals reviewed

## 2018-03-06 NOTE — PATIENT INSTRUCTIONS
Prednisone once a day, Zithromax once a day, Use inhaler 4 x a day AND additionally as needed, Go to DME store at [facility] to get nebulizer and then get inhalation solution

## 2018-03-06 NOTE — LETTER
March 6, 2018     Patient: Yoselyn Collins   YOB: 1960   Date of Visit: 3/6/2018       To Whom It May Concern: It is my medical opinion that Javier Koch may return to work on 3/8/2018  If you have any questions or concerns, please don't hesitate to call           Sincerely,        Yohannes Jones MD    CC: No Recipients

## 2018-04-10 ENCOUNTER — HOSPITAL ENCOUNTER (OUTPATIENT)
Dept: BONE DENSITY | Facility: IMAGING CENTER | Age: 58
Discharge: HOME | End: 2018-04-10
Payer: COMMERCIAL

## 2018-04-10 DIAGNOSIS — Z12.31 ENCOUNTER FOR SCREENING MAMMOGRAM FOR MALIGNANT NEOPLASM OF BREAST: ICD-10-CM

## 2018-04-10 PROCEDURE — 77067 SCR MAMMO BI INCL CAD: CPT

## 2018-05-01 RX ORDER — ALBUTEROL SULFATE 2.5 MG/3ML
SOLUTION RESPIRATORY (INHALATION) 4 TIMES DAILY PRN
Refills: 0 | COMMUNITY
Start: 2018-03-07 | End: 2018-11-05 | Stop reason: SDUPTHER

## 2018-05-01 RX ORDER — IXEKIZUMAB 80 MG/ML
INJECTION, SOLUTION SUBCUTANEOUS
COMMUNITY
Start: 2018-04-24 | End: 2019-05-06

## 2018-05-07 ENCOUNTER — OFFICE VISIT (OUTPATIENT)
Dept: FAMILY MEDICINE CLINIC | Facility: HOSPITAL | Age: 58
End: 2018-05-07
Payer: COMMERCIAL

## 2018-05-07 VITALS
SYSTOLIC BLOOD PRESSURE: 122 MMHG | BODY MASS INDEX: 32.43 KG/M2 | DIASTOLIC BLOOD PRESSURE: 78 MMHG | WEIGHT: 183 LBS | TEMPERATURE: 97.9 F | HEART RATE: 80 BPM | HEIGHT: 63 IN

## 2018-05-07 DIAGNOSIS — E11.9 CONTROLLED TYPE 2 DIABETES MELLITUS WITHOUT COMPLICATION, WITHOUT LONG-TERM CURRENT USE OF INSULIN (HCC): Primary | ICD-10-CM

## 2018-05-07 DIAGNOSIS — H92.01 RIGHT EAR PAIN: ICD-10-CM

## 2018-05-07 DIAGNOSIS — L40.9 PSORIASIS: ICD-10-CM

## 2018-05-07 PROBLEM — E03.8 OTHER SPECIFIED HYPOTHYROIDISM: Status: ACTIVE | Noted: 2018-05-07

## 2018-05-07 PROBLEM — E07.9 THYROID DISEASE: Status: ACTIVE | Noted: 2018-05-07

## 2018-05-07 PROCEDURE — 99214 OFFICE O/P EST MOD 30 MIN: CPT | Performed by: INTERNAL MEDICINE

## 2018-05-07 PROCEDURE — 3008F BODY MASS INDEX DOCD: CPT | Performed by: INTERNAL MEDICINE

## 2018-05-07 NOTE — PROGRESS NOTES
Assessment/Plan:    Controlled type 2 diabetes mellitus without complication, without long-term current use of insulin ([address] )  Still no copy of BW results from Dr Mitzi Daily at [place] - will try and obtain, has Bw order for Oct, con't current meds, pt has eye appt for June 2018, she states she has foot exam with Endo every Oct - will get copy of results, on ACE    Psoriasis  Great improvement with Sameul Her - con't med as per Derm       Diagnoses and all orders for this visit:    Controlled type 2 diabetes mellitus without complication, without long-term current use of insulin ([address] )    Right ear pain  Comments:  Pt reassured exam nml, advised warm compresses and Tylenol, call with F/C/drainage/worsening or persistent pain    Psoriasis      Mammo done April 2018    GYN done Nov 2017    Elton 2014 - good for 5 yrs    Subjective:      Patient ID: Jeffy Hensley is a 56 y o  female  HPI Pt here for routine follow up appt    She was not able to get her BW results from her Endo from last year but she states she only does her labs once a year  She has not had her Metformin changed in some time  She is due for eye exam in June and will call and make appt (she follows with [facility])  She has foot exam with Endo every Oct (Dr Mitzi Daily in [place])  She has had R>L ear pain x 1-2 wks  She notes no other cold symptoms  She notes no drainage from the ears/decreased hearing/ringing in ears  She has not tried anything for the ear  She has seen Derm and was started on Taltz for her psoriasis  She is on the injections every 2 wks x 12 wks and then goes to once a month  She noted great improvement within days and notes no SE with medication  Had mammo in April    PAP done Nov 2017    Elton done 2014 - good for 5 yrs    Review of Systems   Constitutional: Negative for chills and fever  HENT: Negative for congestion and sore throat  Eyes: Negative for pain and discharge     Respiratory: Negative for cough, shortness of breath and wheezing  Cardiovascular: Negative for chest pain, palpitations and leg swelling  Gastrointestinal: Negative for abdominal pain, constipation, diarrhea and nausea  Endocrine: Negative for polydipsia and polyuria  Genitourinary: Negative for difficulty urinating and dysuria  Musculoskeletal: Negative for back pain and neck pain  Skin: Negative for rash and wound  Neurological: Negative for dizziness, syncope, light-headedness and headaches  Hematological: Negative for adenopathy  Psychiatric/Behavioral: Negative for behavioral problems and confusion  Objective:    /78   Pulse 80   Temp 97 9 °F (36 6 °C)   Ht 5' 3" (1 6 m)   Wt 83 kg (183 lb)   BMI 32 42 kg/m²      Physical Exam   Constitutional: She appears well-developed and well-nourished  No distress  HENT:   Head: Normocephalic and atraumatic  Right Ear: External ear normal    Left Ear: External ear normal    Mouth/Throat: Oropharynx is clear and moist  No oropharyngeal exudate  Eyes: Conjunctivae are normal  Right eye exhibits no discharge  Left eye exhibits no discharge  Neck: Neck supple  No tracheal deviation present  Cardiovascular: Normal rate, regular rhythm and normal heart sounds  Exam reveals no friction rub  No murmur heard  Pulmonary/Chest: Effort normal and breath sounds normal  No respiratory distress  She has no wheezes  She has no rales  Abdominal: Soft  She exhibits no distension  There is no tenderness  There is no guarding  Musculoskeletal: She exhibits no edema  Neurological: She is alert  She exhibits normal muscle tone  Skin: Skin is warm  No rash noted  Psychiatric: She has a normal mood and affect  Her behavior is normal    Nursing note and vitals reviewed

## 2018-05-07 NOTE — ASSESSMENT & PLAN NOTE
Still no copy of BW results from Dr Zina Ireland at [place] - will try and obtain, has Bw order for Oct, con't current meds, pt has eye appt for June 2018, she states she has foot exam with Endo every Oct - will get copy of results, on ACE

## 2018-06-18 LAB
LEFT EYE DIABETIC RETINOPATHY: NORMAL
LEFT EYE DIABETIC RETINOPATHY: NORMAL
RIGHT EYE DIABETIC RETINOPATHY: NORMAL
RIGHT EYE DIABETIC RETINOPATHY: NORMAL

## 2018-06-18 PROCEDURE — 3072F LOW RISK FOR RETINOPATHY: CPT | Performed by: INTERNAL MEDICINE

## 2018-10-22 DIAGNOSIS — E11.9 TYPE 2 DIABETES MELLITUS WITHOUT COMPLICATION, WITHOUT LONG-TERM CURRENT USE OF INSULIN (HCC): ICD-10-CM

## 2018-10-22 DIAGNOSIS — I10 ESSENTIAL HYPERTENSION: Primary | ICD-10-CM

## 2018-10-22 PROCEDURE — 4010F ACE/ARB THERAPY RXD/TAKEN: CPT | Performed by: INTERNAL MEDICINE

## 2018-10-22 RX ORDER — METFORMIN HYDROCHLORIDE 500 MG/1
1500 TABLET, EXTENDED RELEASE ORAL
Qty: 30 TABLET | Refills: 5 | Status: SHIPPED | OUTPATIENT
Start: 2018-10-22 | End: 2019-02-25 | Stop reason: SDUPTHER

## 2018-10-22 RX ORDER — LISINOPRIL 20 MG/1
20 TABLET ORAL DAILY
Qty: 30 TABLET | Refills: 5 | Status: SHIPPED | OUTPATIENT
Start: 2018-10-22 | End: 2019-02-25 | Stop reason: SDUPTHER

## 2018-11-05 ENCOUNTER — OFFICE VISIT (OUTPATIENT)
Dept: FAMILY MEDICINE CLINIC | Facility: HOSPITAL | Age: 58
End: 2018-11-05
Payer: COMMERCIAL

## 2018-11-05 VITALS
SYSTOLIC BLOOD PRESSURE: 132 MMHG | TEMPERATURE: 98.4 F | HEART RATE: 90 BPM | HEIGHT: 61 IN | DIASTOLIC BLOOD PRESSURE: 72 MMHG | BODY MASS INDEX: 35.57 KG/M2 | WEIGHT: 188.4 LBS

## 2018-11-05 DIAGNOSIS — R05.9 COUGH: ICD-10-CM

## 2018-11-05 DIAGNOSIS — E03.8 OTHER SPECIFIED HYPOTHYROIDISM: ICD-10-CM

## 2018-11-05 DIAGNOSIS — E55.9 HYPOVITAMINOSIS D: Primary | ICD-10-CM

## 2018-11-05 DIAGNOSIS — E11.9 CONTROLLED TYPE 2 DIABETES MELLITUS WITHOUT COMPLICATION, WITHOUT LONG-TERM CURRENT USE OF INSULIN (HCC): ICD-10-CM

## 2018-11-05 DIAGNOSIS — E11.9 CONTROLLED TYPE 2 DIABETES MELLITUS WITHOUT COMPLICATION, WITHOUT LONG-TERM CURRENT USE OF INSULIN (HCC): Primary | ICD-10-CM

## 2018-11-05 DIAGNOSIS — E78.1 ESSENTIAL HYPERTRIGLYCERIDEMIA: ICD-10-CM

## 2018-11-05 DIAGNOSIS — E55.9 HYPOVITAMINOSIS D: ICD-10-CM

## 2018-11-05 PROCEDURE — 99214 OFFICE O/P EST MOD 30 MIN: CPT | Performed by: INTERNAL MEDICINE

## 2018-11-05 PROCEDURE — 3008F BODY MASS INDEX DOCD: CPT | Performed by: INTERNAL MEDICINE

## 2018-11-05 RX ORDER — FLUTICASONE PROPIONATE AND SALMETEROL 55; 14 UG/1; UG/1
1 POWDER, METERED RESPIRATORY (INHALATION) 2 TIMES DAILY
Qty: 1 EACH | Refills: 1 | Status: SHIPPED | OUTPATIENT
Start: 2018-11-05 | End: 2019-02-19 | Stop reason: SDUPTHER

## 2018-11-05 RX ORDER — AMOXICILLIN AND CLAVULANATE POTASSIUM 875; 125 MG/1; MG/1
1 TABLET, FILM COATED ORAL EVERY 12 HOURS SCHEDULED
Qty: 14 TABLET | Refills: 0 | Status: SHIPPED | OUTPATIENT
Start: 2018-11-05 | End: 2018-11-12

## 2018-11-05 RX ORDER — ALBUTEROL SULFATE 2.5 MG/3ML
2.5 SOLUTION RESPIRATORY (INHALATION) EVERY 6 HOURS PRN
Qty: 30 VIAL | Refills: 2 | Status: SHIPPED | OUTPATIENT
Start: 2018-11-05 | End: 2018-12-05

## 2018-11-05 NOTE — ASSESSMENT & PLAN NOTE
Has had some wgt gain and fatigue and hair loss - overdue for Bw - order given, con't current levothyroxine dose for now

## 2018-11-05 NOTE — ASSESSMENT & PLAN NOTE
Lab Results   Component Value Date    HGBA1C 5 4 09/19/2017       No results for input(s): POCGLU in the last 72 hours      Blood Sugar Average: Last 72 hrs:  DM type 2 without complications - controlled with last A1C 5 4 - pt no longer following with Endo (retired) - order for Bw given - foot exam done today, UTD on eye exam, on Ace but no statin

## 2018-11-07 LAB
25(OH)D3+25(OH)D2 SERPL-MCNC: 33 NG/ML (ref 30–100)
ALBUMIN SERPL-MCNC: 4.7 G/DL (ref 3.5–5.5)
ALBUMIN/CREAT UR: 38.5 MG/G CREAT (ref 0–30)
ALBUMIN/GLOB SERPL: 1.4 {RATIO} (ref 1.2–2.2)
ALP SERPL-CCNC: 68 IU/L (ref 39–117)
ALT SERPL-CCNC: 25 IU/L (ref 0–32)
AST SERPL-CCNC: 24 IU/L (ref 0–40)
BASOPHILS # BLD AUTO: 0.1 X10E3/UL (ref 0–0.2)
BASOPHILS NFR BLD AUTO: 2 %
BILIRUB SERPL-MCNC: 0.2 MG/DL (ref 0–1.2)
BUN SERPL-MCNC: 13 MG/DL (ref 6–24)
BUN/CREAT SERPL: 17 (ref 9–23)
CALCIUM SERPL-MCNC: 9.6 MG/DL (ref 8.7–10.2)
CHLORIDE SERPL-SCNC: 106 MMOL/L (ref 96–106)
CHOLEST SERPL-MCNC: 167 MG/DL (ref 100–199)
CHOLEST/HDLC SERPL: 4.2 RATIO (ref 0–4.4)
CO2 SERPL-SCNC: 21 MMOL/L (ref 20–29)
CREAT SERPL-MCNC: 0.75 MG/DL (ref 0.57–1)
CREAT UR-MCNC: 95.1 MG/DL
EOSINOPHIL # BLD AUTO: 0.6 X10E3/UL (ref 0–0.4)
EOSINOPHIL NFR BLD AUTO: 14 %
ERYTHROCYTE [DISTWIDTH] IN BLOOD BY AUTOMATED COUNT: 13.4 % (ref 12.3–15.4)
EST. AVERAGE GLUCOSE BLD GHB EST-MCNC: 120 MG/DL
GLOBULIN SER-MCNC: 3.3 G/DL (ref 1.5–4.5)
GLUCOSE SERPL-MCNC: 89 MG/DL (ref 65–99)
HBA1C MFR BLD: 5.8 % (ref 4.8–5.6)
HCT VFR BLD AUTO: 40.8 % (ref 34–46.6)
HDLC SERPL-MCNC: 40 MG/DL
HGB BLD-MCNC: 14.4 G/DL (ref 11.1–15.9)
IMM GRANULOCYTES # BLD: 0 X10E3/UL (ref 0–0.1)
IMM GRANULOCYTES NFR BLD: 0 %
LDLC SERPL CALC-MCNC: 106 MG/DL (ref 0–99)
LYMPHOCYTES # BLD AUTO: 1.7 X10E3/UL (ref 0.7–3.1)
LYMPHOCYTES NFR BLD AUTO: 40 %
MCH RBC QN AUTO: 31.2 PG (ref 26.6–33)
MCHC RBC AUTO-ENTMCNC: 35.3 G/DL (ref 31.5–35.7)
MCV RBC AUTO: 89 FL (ref 79–97)
MICROALBUMIN UR-MCNC: 36.6 UG/ML
MONOCYTES # BLD AUTO: 0.5 X10E3/UL (ref 0.1–0.9)
MONOCYTES NFR BLD AUTO: 10 %
NEUTROPHILS # BLD AUTO: 1.5 X10E3/UL (ref 1.4–7)
NEUTROPHILS NFR BLD AUTO: 34 %
PLATELET # BLD AUTO: 272 X10E3/UL (ref 150–379)
POTASSIUM SERPL-SCNC: 4.4 MMOL/L (ref 3.5–5.2)
PROT SERPL-MCNC: 8 G/DL (ref 6–8.5)
RBC # BLD AUTO: 4.61 X10E6/UL (ref 3.77–5.28)
SL AMB EGFR AFRICAN AMERICAN: 103 ML/MIN/1.73
SL AMB EGFR NON AFRICAN AMERICAN: 89 ML/MIN/1.73
SL AMB VLDL CHOLESTEROL CALC: 21 MG/DL (ref 5–40)
SODIUM SERPL-SCNC: 140 MMOL/L (ref 134–144)
T4 FREE SERPL-MCNC: 1.41 NG/DL (ref 0.82–1.77)
TRIGL SERPL-MCNC: 106 MG/DL (ref 0–149)
TSH SERPL DL<=0.005 MIU/L-ACNC: 3.44 UIU/ML (ref 0.45–4.5)
WBC # BLD AUTO: 4.4 X10E3/UL (ref 3.4–10.8)

## 2018-11-07 PROCEDURE — 3044F HG A1C LEVEL LT 7.0%: CPT | Performed by: INTERNAL MEDICINE

## 2018-11-12 ENCOUNTER — ANNUAL EXAM (OUTPATIENT)
Dept: OBGYN CLINIC | Facility: CLINIC | Age: 58
End: 2018-11-12
Payer: COMMERCIAL

## 2018-11-12 VITALS
DIASTOLIC BLOOD PRESSURE: 84 MMHG | BODY MASS INDEX: 34.41 KG/M2 | SYSTOLIC BLOOD PRESSURE: 130 MMHG | HEIGHT: 62 IN | WEIGHT: 187 LBS

## 2018-11-12 DIAGNOSIS — Z12.31 ENCOUNTER FOR SCREENING MAMMOGRAM FOR BREAST CANCER: ICD-10-CM

## 2018-11-12 DIAGNOSIS — R87.610 ASCUS OF CERVIX WITH NEGATIVE HIGH RISK HPV: Primary | ICD-10-CM

## 2018-11-12 DIAGNOSIS — Z12.4 CERVICAL CANCER SCREENING: ICD-10-CM

## 2018-11-12 DIAGNOSIS — Z01.419 ENCOUNTER FOR ANNUAL ROUTINE GYNECOLOGICAL EXAMINATION: ICD-10-CM

## 2018-11-12 PROCEDURE — S0612 ANNUAL GYNECOLOGICAL EXAMINA: HCPCS | Performed by: OBSTETRICS & GYNECOLOGY

## 2018-11-12 NOTE — PROGRESS NOTES
Assessment/Plan:    ASCUS with negative HPV-Pap with reflex done today, reviewed this with her    Mammogram reviewed with her, prescription given for next April  Discussed self-breast exam    Colonoscopy is up to date, she goes every 5 years  Discussed regular exercise    She currently keeps a menstrual calendar and will continue to do so, reviewed symptoms with which she should call  No problem-specific Assessment & Plan notes found for this encounter  Diagnoses and all orders for this visit:    Encounter for annual routine gynecological examination    Encounter for screening mammogram for breast cancer  -     Mammo screening bilateral w cad; Future    Cervical cancer screening    ASCUS of cervix with negative high risk HPV          Subjective:      Patient ID: Harpal Hernandez is a 66 y o  female  Patient here for yearly  Last year, she had ASCUS with negative HPV  She had a normal mammogram in April of this year  Her menstrual cycles have started to space out  The longest she has gone without a cycle is 4 months  Her last cycle was in September  She has no midcycle bleeding, or heavy flow  She does not have hot flashes  All of her sisters are in her 46s and still getting cycles  She has been on a biologic for her psoriasis for a year or 2  She has good results from this  She just had a skin biopsy done by her dermatologist and there is some question of lupus  They are awaiting further results  Her diabetes is under good control  The following portions of the patient's history were reviewed and updated as appropriate: allergies, current medications, past family history, past medical history, past social history, past surgical history and problem list     Review of Systems   Constitutional: Negative  HENT: Negative  Eyes: Negative  Respiratory: Negative  Cardiovascular: Negative  Gastrointestinal: Negative  Endocrine: Negative  Genitourinary: Negative  Musculoskeletal: Negative  Skin: Negative  Allergic/Immunologic: Negative  Neurological: Negative  Hematological: Negative  Psychiatric/Behavioral: Negative  Objective:      /84 (BP Location: Right arm, Patient Position: Sitting, Cuff Size: Standard)   Ht 5' 2" (1 575 m)   Wt 84 8 kg (187 lb)   LMP 09/09/2018 (Exact Date)   Breastfeeding? No   BMI 34 20 kg/m²          Physical Exam   Constitutional: She appears well-developed  Neck: No tracheal deviation present  No thyromegaly present  Cardiovascular: Normal rate and regular rhythm  Pulmonary/Chest: Effort normal and breath sounds normal  Right breast exhibits no inverted nipple, no mass, no nipple discharge, no skin change and no tenderness  Left breast exhibits no inverted nipple, no mass, no nipple discharge, no skin change and no tenderness  Breasts are symmetrical    Examined seated and supine   Abdominal: Soft  She exhibits no distension and no mass  There is no tenderness  Genitourinary: Rectum normal, vagina normal and uterus normal  No labial fusion  There is no rash, tenderness, lesion or injury on the right labia  There is no rash, tenderness, lesion or injury on the left labia  Cervix exhibits no motion tenderness, no discharge and no friability  Right adnexum displays no mass, no tenderness and no fullness  Left adnexum displays no mass, no tenderness and no fullness  Vitals reviewed

## 2018-11-14 LAB
CYTOLOGIST CVX/VAG CYTO: NORMAL
DX ICD CODE: NORMAL
OTHER STN SPEC: NORMAL
OTHER STN SPEC: NORMAL
PATH REPORT.FINAL DX SPEC: NORMAL
SL AMB NOTE:: NORMAL
SL AMB SPECIMEN ADEQUACY: NORMAL
SL AMB TEST METHODOLOGY: NORMAL

## 2018-11-15 ENCOUNTER — TELEPHONE (OUTPATIENT)
Dept: OBGYN CLINIC | Facility: CLINIC | Age: 58
End: 2018-11-15

## 2019-02-19 DIAGNOSIS — R05.9 COUGH: ICD-10-CM

## 2019-02-19 RX ORDER — FLUTICASONE PROPIONATE AND SALMETEROL 55; 14 UG/1; UG/1
POWDER, METERED RESPIRATORY (INHALATION)
Qty: 1 EACH | Refills: 1 | Status: SHIPPED | OUTPATIENT
Start: 2019-02-19 | End: 2020-05-07

## 2019-02-21 DIAGNOSIS — E78.1 ESSENTIAL HYPERTRIGLYCERIDEMIA: Primary | ICD-10-CM

## 2019-02-21 RX ORDER — FENOFIBRATE 200 MG/1
200 CAPSULE ORAL DAILY
Qty: 90 CAPSULE | Refills: 1 | Status: SHIPPED | OUTPATIENT
Start: 2019-02-21 | End: 2019-06-27 | Stop reason: SDUPTHER

## 2019-02-25 DIAGNOSIS — E11.9 TYPE 2 DIABETES MELLITUS WITHOUT COMPLICATION, WITHOUT LONG-TERM CURRENT USE OF INSULIN (HCC): ICD-10-CM

## 2019-02-25 DIAGNOSIS — I10 ESSENTIAL HYPERTENSION: ICD-10-CM

## 2019-02-25 DIAGNOSIS — E03.8 OTHER SPECIFIED HYPOTHYROIDISM: Primary | ICD-10-CM

## 2019-02-25 PROCEDURE — 4010F ACE/ARB THERAPY RXD/TAKEN: CPT | Performed by: INTERNAL MEDICINE

## 2019-02-25 RX ORDER — LEVOTHYROXINE SODIUM 0.2 MG/1
200 TABLET ORAL DAILY
Qty: 90 TABLET | Refills: 2 | Status: SHIPPED | OUTPATIENT
Start: 2019-02-25 | End: 2019-05-06

## 2019-02-25 RX ORDER — METFORMIN HYDROCHLORIDE 500 MG/1
1500 TABLET, EXTENDED RELEASE ORAL
Qty: 90 TABLET | Refills: 2 | Status: SHIPPED | OUTPATIENT
Start: 2019-02-25 | End: 2019-11-07

## 2019-02-25 RX ORDER — LISINOPRIL 20 MG/1
20 TABLET ORAL DAILY
Qty: 90 TABLET | Refills: 2 | Status: SHIPPED | OUTPATIENT
Start: 2019-02-25 | End: 2020-06-18

## 2019-02-25 NOTE — TELEPHONE ENCOUNTER
Pt wants her all meds on a 90 day supply  [facility]  Fenofibrate   Metformin  Levothyroxine  Lisinopril  Levothyroxine  If she only gets 30 supply, when she can pay the same for 90

## 2019-03-08 ENCOUNTER — OFFICE VISIT (OUTPATIENT)
Dept: URGENT CARE | Facility: CLINIC | Age: 59
End: 2019-03-08
Payer: COMMERCIAL

## 2019-03-08 VITALS
SYSTOLIC BLOOD PRESSURE: 128 MMHG | TEMPERATURE: 99.8 F | OXYGEN SATURATION: 96 % | HEIGHT: 62 IN | DIASTOLIC BLOOD PRESSURE: 74 MMHG | HEART RATE: 102 BPM | WEIGHT: 196 LBS | RESPIRATION RATE: 16 BRPM | BODY MASS INDEX: 36.07 KG/M2

## 2019-03-08 DIAGNOSIS — J40 BRONCHITIS: Primary | ICD-10-CM

## 2019-03-08 DIAGNOSIS — R06.2 WHEEZING: ICD-10-CM

## 2019-03-08 PROCEDURE — 99213 OFFICE O/P EST LOW 20 MIN: CPT | Performed by: PHYSICIAN ASSISTANT

## 2019-03-08 RX ORDER — AZITHROMYCIN 250 MG/1
TABLET, FILM COATED ORAL
Qty: 6 TABLET | Refills: 0 | Status: SHIPPED | OUTPATIENT
Start: 2019-03-08 | End: 2019-03-13

## 2019-03-08 RX ORDER — PREDNISONE 10 MG/1
TABLET ORAL
Qty: 12 TABLET | Refills: 0 | Status: SHIPPED | OUTPATIENT
Start: 2019-03-08 | End: 2019-03-18 | Stop reason: HOSPADM

## 2019-03-08 NOTE — PATIENT INSTRUCTIONS
Take azithromycin as directed  Take prednisone as directed  Increase fluids  Continue asthma medications and rescue inhaler as needed  If anything changes or worsens, go to the ER  F/u with PCP If no improvement in 2-3 days

## 2019-03-08 NOTE — PROGRESS NOTES
NAME: Alicia Richards is a 66 y o  female  : 1960    MRN: [id number]      Assessment and Plan   Bronchitis [J40]  1  Bronchitis  azithromycin (ZITHROMAX) 250 mg tablet    predniSONE 10 mg tablet   2  Wheezing  XR chest pa & lateral    predniSONE 10 mg tablet       CXR: no evidence of pneumonia  Patient Instructions   Patient Instructions   Take azithromycin as directed  Take prednisone as directed  Increase fluids  Continue asthma medications and rescue inhaler as needed  If anything changes or worsens, go to the ER  F/u with PCP If no improvement in 2-3 days     Proceed to ER if symptoms worsen  Chief Complaint     Chief Complaint   Patient presents with    Shortness of Breath     startyed yesterday    Fever     last night    Cough      2 days         History of Present Illness   Patient with past medical history of asthma and diabetes presents complaining of cough and congestion x2 days  She reports Wednesday she started with a cough and chills  She reports she has been coughing so much it hurts her ribs and had to cough  Reports sinus pain and pressure along with chest tightness and shortness of breath  She states she started using her rescue inhaler today with some improvement  Reports fever of 101 Wednesday night and yesterday  She states that the shortness of breath is usually just with exertion or coughing  Denies dizziness or lightheadedness  Denies any chest pain or palpitations  She has not taken anything else over-the-counter  Review of Systems   Review of Systems   Constitutional: Positive for chills and fever  HENT: Positive for congestion, rhinorrhea, sinus pressure and sinus pain  Negative for ear pain and sore throat  Respiratory: Positive for cough, chest tightness, shortness of breath and wheezing  Negative for choking and stridor  Cardiovascular: Negative for chest pain, palpitations and leg swelling           Current Medications       Current Outpatient Medications:     cholecalciferol (VITAMIN D3) 1,000 units tablet, Take 1 tablet by mouth daily, Disp: , Rfl:     fenofibrate micronized (LOFIBRA) 200 MG capsule, Take 1 capsule (200 mg total) by mouth daily for 90 days, Disp: 90 capsule, Rfl: 1    fluticasone-salmeterol (AIRDUO RESPICLICK) 13-84 mcg/act dry powder inhaler, INHALE 1 PUFF TWICE DAILY, Disp: 1 each, Rfl: 1    levothyroxine 200 mcg tablet, Take 1 tablet (200 mcg total) by mouth daily, Disp: 90 tablet, Rfl: 2    lisinopril (ZESTRIL) 20 mg tablet, Take 1 tablet (20 mg total) by mouth daily, Disp: 90 tablet, Rfl: 2    metFORMIN (GLUCOPHAGE-XR) 500 mg 24 hr tablet, Take 3 tablets (1,500 mg total) by mouth daily with breakfast, Disp: 90 tablet, Rfl: 2    TALTZ 80 MG/ML SOAJ, Every other wk x 12 wks then once a month , Disp: , Rfl:     azithromycin (ZITHROMAX) 250 mg tablet, Take 2 tablets today then 1 tablet daily x 4 days, Disp: 6 tablet, Rfl: 0    predniSONE 10 mg tablet, Take 3 tablets for 2 days, 2 tablets for 2 days and 1 tablet for 2 days, Disp: 12 tablet, Rfl: 0    varenicline (CHANTIX STARTING MONTH ) 0 5 MG X 11 & 1 MG X 42 tablet, Take 1 Package by mouth see administration instructions, Disp: , Rfl:     Current Allergies     Allergies as of 2019 - Reviewed 2019   Allergen Reaction Noted    Methimazole Arthralgia 2012              Past Medical History:   Diagnosis Date    Anxiety     Hypertension     Psoriasis        Past Surgical History:   Procedure Laterality Date     SECTION, LOW TRANSVERSE      COLONOSCOPY      colo 16 - polyp at sigmoid colon - 6 yr if adenoma or 10 yr if hyperplastic pathology: early hyperplastic changes     GALLBLADDER SURGERY      LAPAROSCOPY FOR ECTOPIC PREGNANCY      With excision    THYROID SURGERY         Family History   Problem Relation Age of Onset    Asthma Mother     Diabetes Father     Hypertension Father     Hypertension Family     Neuropathy Family     Stroke Family     Thyroid disease Family          Medications have been verified  The following portions of the patient's history were reviewed and updated as appropriate: allergies, current medications, past family history, past medical history, past social history, past surgical history and problem list     Objective   /74   Pulse 102   Temp 99 8 °F (37 7 °C)   Resp 16   Ht 5' 2" (1 575 m)   Wt 88 9 kg (196 lb)   SpO2 96%   BMI 35 85 kg/m²      Physical Exam     Physical Exam   Constitutional: She appears well-developed and well-nourished  Non-toxic appearance  She does not appear ill  No distress  HENT:   TMs clear bilaterally without erythema or bulging  Nasal mucosa without erythema or edema  Posterior oropharynx is clear without erythema, edema or exudates  Cardiovascular: Normal rate, regular rhythm and normal heart sounds  Pulmonary/Chest: Effort normal  No stridor  No respiratory distress  She has no decreased breath sounds  She has wheezes (Inspiratory Throughout all lung fields)  She has rhonchi ( inspiratory throughout all lung fields)  She has no rales  Lymphadenopathy:     She has no cervical adenopathy

## 2019-03-08 NOTE — LETTER
March 8, 2019     Patient: Shola Gilliam   YOB: 1960   Date of Visit: 3/8/2019       To Whom it May Concern:    Darinel Gudino was seen in my clinic on 3/8/2019  She may return to work when she is fever free for 24 hours  If you have any questions or concerns, please don't hesitate to call           Sincerely,          Nevin Hyatt PA-C        CC: No Recipients

## 2019-03-12 ENCOUNTER — OFFICE VISIT (OUTPATIENT)
Dept: FAMILY MEDICINE CLINIC | Facility: HOSPITAL | Age: 59
End: 2019-03-12
Payer: COMMERCIAL

## 2019-03-12 VITALS
HEART RATE: 81 BPM | DIASTOLIC BLOOD PRESSURE: 82 MMHG | BODY MASS INDEX: 34.6 KG/M2 | TEMPERATURE: 97.8 F | HEIGHT: 62 IN | WEIGHT: 188 LBS | OXYGEN SATURATION: 97 % | SYSTOLIC BLOOD PRESSURE: 132 MMHG

## 2019-03-12 DIAGNOSIS — Z72.0 CURRENT NICOTINE USE: ICD-10-CM

## 2019-03-12 DIAGNOSIS — J45.41 MODERATE PERSISTENT ASTHMA WITH ACUTE EXACERBATION: Primary | ICD-10-CM

## 2019-03-12 PROCEDURE — 99214 OFFICE O/P EST MOD 30 MIN: CPT | Performed by: INTERNAL MEDICINE

## 2019-03-12 PROCEDURE — 3008F BODY MASS INDEX DOCD: CPT | Performed by: INTERNAL MEDICINE

## 2019-03-12 RX ORDER — PREDNISONE 10 MG/1
TABLET ORAL
Qty: 33 TABLET | Refills: 0 | Status: SHIPPED | OUTPATIENT
Start: 2019-03-12 | End: 2019-03-18 | Stop reason: HOSPADM

## 2019-03-12 RX ORDER — ALBUTEROL SULFATE 90 UG/1
2 AEROSOL, METERED RESPIRATORY (INHALATION) EVERY 4 HOURS PRN
Qty: 3 INHALER | Refills: 3 | Status: SHIPPED | OUTPATIENT
Start: 2019-03-12 | End: 2021-07-27 | Stop reason: SDUPTHER

## 2019-03-12 NOTE — PROGRESS NOTES
Assessment/Plan: Moderate persistent asthma with acute exacerbation  D/w pt that I can give a large loading dose of Prednisone and extend her steroid taper but she if she con't to have no improvement OR if symptoms worsen at all she needs to go to the ER for admission as she is almost considered failure of OP therapy - will add rescue inhaler to give her additional relief, con't Airduo bid and nebs bid prn, encouraged to con't with smoking cessation attempt    Current nicotine use  No smoking in a week d/t above resp symptoms - congratulated and encouraged to con't with cessation attempt       Diagnoses and all orders for this visit:    Moderate persistent asthma with acute exacerbation  -     albuterol (PROVENTIL HFA,VENTOLIN HFA) 90 mcg/act inhaler; Inhale 2 puffs every 4 (four) hours as needed for wheezing or shortness of breath  -     predniSONE 10 mg tablet; 3 tab PO bid x 2 days  then 2 tab PO bid x 3 days then 1 tab PO bid x 3 days then 1 tab PO q day x 3 days then stop    Current nicotine use          Subjective:      Patient ID: Cecil Asencio is a 66 y o  female  HPI Pt here for UC follow up - OV note was reviewed  Pt was seen at [facility] 3/8/19 with c/o SOB/fever/cough x 2 days  On exam O2 sat was 96% on RA and HR tachy at 102 but rest of vitals wnl (T 99 8)  Pt had CXR done and no pneumonia was noted  She was tx for bronchitis with Zithromax and Prednisone  She still has 1 more day of the Prednisone but finished the abx this am   She still has cough and con't to c/o SOB and wheezing  She is using Airduo 1 puff bid  She does not have a rescue inhaler but is using a nebulizer just before bed  She is a smoker but has not smoked since last week  Review of Systems   Constitutional: Positive for fatigue  Negative for chills, fever and unexpected weight change  HENT: Positive for congestion  Negative for sore throat  Eyes: Negative for pain and discharge     Respiratory: Positive for cough, shortness of breath and wheezing  Cardiovascular: Negative for chest pain, palpitations and leg swelling  Gastrointestinal: Negative for abdominal pain, diarrhea and nausea  Genitourinary: Negative for difficulty urinating and dysuria  Musculoskeletal: Positive for arthralgias  Negative for back pain and neck pain  Skin: Negative for rash and wound  Neurological: Negative for dizziness and headaches  Hematological: Negative for adenopathy  Psychiatric/Behavioral: Negative for behavioral problems and confusion  Objective:    /82 (BP Location: Right arm, Patient Position: Sitting, Cuff Size: Standard)   Pulse 81   Temp 97 8 °F (36 6 °C)   Ht 5' 2" (1 575 m)   Wt 85 3 kg (188 lb)   SpO2 97%   BMI 34 39 kg/m²      Physical Exam   Constitutional: She appears well-developed and well-nourished  No distress  HENT:   Head: Normocephalic and atraumatic  Right Ear: External ear normal    Left Ear: External ear normal    Mouth/Throat: Oropharynx is clear and moist    Eyes: Conjunctivae are normal  Right eye exhibits no discharge  Left eye exhibits no discharge  Neck: Neck supple  No tracheal deviation present  Cardiovascular: Normal rate, regular rhythm and normal heart sounds  Exam reveals no friction rub  No murmur heard  Pulmonary/Chest: Effort normal  No respiratory distress  She has no wheezes  SOB with mild exertion but able to talk in nml sentences, diffuse wheezing and rhonchi B/L   Abdominal: Soft  She exhibits no distension  There is no tenderness  There is no guarding  Musculoskeletal: She exhibits no edema  Lymphadenopathy:     She has cervical adenopathy  Neurological: She is alert  She exhibits normal muscle tone  Skin: Skin is warm and dry  No rash noted  Psychiatric: She has a normal mood and affect  Her behavior is normal    Nursing note and vitals reviewed

## 2019-03-15 ENCOUNTER — APPOINTMENT (EMERGENCY)
Dept: RADIOLOGY | Facility: HOSPITAL | Age: 59
DRG: 203 | End: 2019-03-15
Payer: COMMERCIAL

## 2019-03-15 ENCOUNTER — HOSPITAL ENCOUNTER (INPATIENT)
Facility: HOSPITAL | Age: 59
LOS: 3 days | Discharge: HOME | DRG: 203 | End: 2019-03-18
Attending: EMERGENCY MEDICINE | Admitting: INTERNAL MEDICINE
Payer: COMMERCIAL

## 2019-03-15 DIAGNOSIS — Z72.0 TOBACCO ABUSE: ICD-10-CM

## 2019-03-15 DIAGNOSIS — J45.901 ASTHMA EXACERBATION: Primary | ICD-10-CM

## 2019-03-15 LAB
ALBUMIN SERPL BCP-MCNC: 3.7 G/DL (ref 3.5–5)
ALP SERPL-CCNC: 72 U/L (ref 46–116)
ALT SERPL W P-5'-P-CCNC: 31 U/L (ref 12–78)
ANION GAP SERPL CALCULATED.3IONS-SCNC: 9 MMOL/L (ref 4–13)
AST SERPL W P-5'-P-CCNC: 18 U/L (ref 5–45)
ATRIAL RATE: 73 BPM
BASOPHILS # BLD AUTO: 0.08 THOUSANDS/ΜL (ref 0–0.1)
BASOPHILS NFR BLD AUTO: 1 % (ref 0–1)
BILIRUB SERPL-MCNC: 0.2 MG/DL (ref 0.2–1)
BUN SERPL-MCNC: 24 MG/DL (ref 5–25)
CALCIUM SERPL-MCNC: 9.2 MG/DL (ref 8.3–10.1)
CHLORIDE SERPL-SCNC: 105 MMOL/L (ref 100–108)
CO2 SERPL-SCNC: 27 MMOL/L (ref 21–32)
CREAT SERPL-MCNC: 0.96 MG/DL (ref 0.6–1.3)
EOSINOPHIL # BLD AUTO: 0.01 THOUSAND/ΜL (ref 0–0.61)
EOSINOPHIL NFR BLD AUTO: 0 % (ref 0–6)
ERYTHROCYTE [DISTWIDTH] IN BLOOD BY AUTOMATED COUNT: 12.6 % (ref 11.6–15.1)
GFR SERPL CREATININE-BSD FRML MDRD: 66 ML/MIN/1.73SQ M
GLUCOSE SERPL-MCNC: 152 MG/DL (ref 65–140)
GLUCOSE SERPL-MCNC: 88 MG/DL (ref 65–140)
HCT VFR BLD AUTO: 41.8 % (ref 34.8–46.1)
HGB BLD-MCNC: 14.4 G/DL (ref 11.5–15.4)
IMM GRANULOCYTES # BLD AUTO: 0.32 THOUSAND/UL (ref 0–0.2)
IMM GRANULOCYTES NFR BLD AUTO: 2 % (ref 0–2)
LYMPHOCYTES # BLD AUTO: 3.28 THOUSANDS/ΜL (ref 0.6–4.47)
LYMPHOCYTES NFR BLD AUTO: 23 % (ref 14–44)
MAGNESIUM SERPL-MCNC: 2 MG/DL (ref 1.6–2.6)
MCH RBC QN AUTO: 31.2 PG (ref 26.8–34.3)
MCHC RBC AUTO-ENTMCNC: 34.4 G/DL (ref 31.4–37.4)
MCV RBC AUTO: 91 FL (ref 82–98)
MONOCYTES # BLD AUTO: 1.26 THOUSAND/ΜL (ref 0.17–1.22)
MONOCYTES NFR BLD AUTO: 9 % (ref 4–12)
NEUTROPHILS # BLD AUTO: 9.41 THOUSANDS/ΜL (ref 1.85–7.62)
NEUTS SEG NFR BLD AUTO: 65 % (ref 43–75)
NRBC BLD AUTO-RTO: 0 /100 WBCS
P AXIS: 41 DEGREES
PLATELET # BLD AUTO: 330 THOUSANDS/UL (ref 149–390)
PMV BLD AUTO: 10 FL (ref 8.9–12.7)
POTASSIUM SERPL-SCNC: 4.1 MMOL/L (ref 3.5–5.3)
PR INTERVAL: 148 MS
PROT SERPL-MCNC: 8 G/DL (ref 6.4–8.2)
QRS AXIS: 49 DEGREES
QRSD INTERVAL: 78 MS
QT INTERVAL: 386 MS
QTC INTERVAL: 425 MS
RBC # BLD AUTO: 4.61 MILLION/UL (ref 3.81–5.12)
SODIUM SERPL-SCNC: 141 MMOL/L (ref 136–145)
T WAVE AXIS: 38 DEGREES
VENTRICULAR RATE: 73 BPM
WBC # BLD AUTO: 14.36 THOUSAND/UL (ref 4.31–10.16)

## 2019-03-15 PROCEDURE — 96361 HYDRATE IV INFUSION ADD-ON: CPT

## 2019-03-15 PROCEDURE — 83735 ASSAY OF MAGNESIUM: CPT | Performed by: EMERGENCY MEDICINE

## 2019-03-15 PROCEDURE — 87631 RESP VIRUS 3-5 TARGETS: CPT | Performed by: INTERNAL MEDICINE

## 2019-03-15 PROCEDURE — 82948 REAGENT STRIP/BLOOD GLUCOSE: CPT

## 2019-03-15 PROCEDURE — 93010 ELECTROCARDIOGRAM REPORT: CPT | Performed by: INTERNAL MEDICINE

## 2019-03-15 PROCEDURE — 36415 COLL VENOUS BLD VENIPUNCTURE: CPT | Performed by: EMERGENCY MEDICINE

## 2019-03-15 PROCEDURE — 93005 ELECTROCARDIOGRAM TRACING: CPT

## 2019-03-15 PROCEDURE — 99285 EMERGENCY DEPT VISIT HI MDM: CPT

## 2019-03-15 PROCEDURE — 94664 DEMO&/EVAL PT USE INHALER: CPT

## 2019-03-15 PROCEDURE — 94640 AIRWAY INHALATION TREATMENT: CPT

## 2019-03-15 PROCEDURE — 80053 COMPREHEN METABOLIC PANEL: CPT | Performed by: EMERGENCY MEDICINE

## 2019-03-15 PROCEDURE — 96365 THER/PROPH/DIAG IV INF INIT: CPT

## 2019-03-15 PROCEDURE — 96375 TX/PRO/DX INJ NEW DRUG ADDON: CPT

## 2019-03-15 PROCEDURE — 94760 N-INVAS EAR/PLS OXIMETRY 1: CPT

## 2019-03-15 PROCEDURE — 71046 X-RAY EXAM CHEST 2 VIEWS: CPT

## 2019-03-15 PROCEDURE — 99222 1ST HOSP IP/OBS MODERATE 55: CPT | Performed by: INTERNAL MEDICINE

## 2019-03-15 PROCEDURE — 85025 COMPLETE CBC W/AUTO DIFF WBC: CPT | Performed by: EMERGENCY MEDICINE

## 2019-03-15 RX ORDER — SODIUM CHLORIDE FOR INHALATION 0.9 %
12 VIAL, NEBULIZER (ML) INHALATION ONCE
Status: COMPLETED | OUTPATIENT
Start: 2019-03-15 | End: 2019-03-15

## 2019-03-15 RX ORDER — ACETAMINOPHEN 325 MG/1
650 TABLET ORAL EVERY 6 HOURS PRN
Status: DISCONTINUED | OUTPATIENT
Start: 2019-03-15 | End: 2019-03-18 | Stop reason: HOSPADM

## 2019-03-15 RX ORDER — ONDANSETRON 2 MG/ML
4 INJECTION INTRAMUSCULAR; INTRAVENOUS EVERY 6 HOURS PRN
Status: DISCONTINUED | OUTPATIENT
Start: 2019-03-15 | End: 2019-03-18 | Stop reason: HOSPADM

## 2019-03-15 RX ORDER — IPRATROPIUM BROMIDE AND ALBUTEROL SULFATE 2.5; .5 MG/3ML; MG/3ML
3 SOLUTION RESPIRATORY (INHALATION) ONCE
Status: COMPLETED | OUTPATIENT
Start: 2019-03-15 | End: 2019-03-15

## 2019-03-15 RX ORDER — FLUTICASONE FUROATE AND VILANTEROL 200; 25 UG/1; UG/1
1 POWDER RESPIRATORY (INHALATION) DAILY
Status: DISCONTINUED | OUTPATIENT
Start: 2019-03-16 | End: 2019-03-18 | Stop reason: HOSPADM

## 2019-03-15 RX ORDER — GUAIFENESIN 600 MG
600 TABLET, EXTENDED RELEASE 12 HR ORAL EVERY 12 HOURS
Status: DISCONTINUED | OUTPATIENT
Start: 2019-03-15 | End: 2019-03-18 | Stop reason: HOSPADM

## 2019-03-15 RX ORDER — METHYLPREDNISOLONE SODIUM SUCCINATE 40 MG/ML
40 INJECTION, POWDER, LYOPHILIZED, FOR SOLUTION INTRAMUSCULAR; INTRAVENOUS EVERY 8 HOURS SCHEDULED
Status: DISCONTINUED | OUTPATIENT
Start: 2019-03-15 | End: 2019-03-16

## 2019-03-15 RX ORDER — LEVOTHYROXINE SODIUM 0.1 MG/1
200 TABLET ORAL DAILY
Status: DISCONTINUED | OUTPATIENT
Start: 2019-03-16 | End: 2019-03-18 | Stop reason: HOSPADM

## 2019-03-15 RX ORDER — VARENICLINE TARTRATE 1 MG/1
0.5 TABLET, FILM COATED ORAL 2 TIMES DAILY
Status: DISCONTINUED | OUTPATIENT
Start: 2019-03-15 | End: 2019-03-15 | Stop reason: ALTCHOICE

## 2019-03-15 RX ORDER — METFORMIN HYDROCHLORIDE 750 MG/1
1500 TABLET, EXTENDED RELEASE ORAL
Status: DISCONTINUED | OUTPATIENT
Start: 2019-03-16 | End: 2019-03-18 | Stop reason: HOSPADM

## 2019-03-15 RX ORDER — SODIUM CHLORIDE 9 MG/ML
125 INJECTION, SOLUTION INTRAVENOUS CONTINUOUS
Status: DISCONTINUED | OUTPATIENT
Start: 2019-03-15 | End: 2019-03-16

## 2019-03-15 RX ORDER — ALBUTEROL SULFATE 90 UG/1
2 AEROSOL, METERED RESPIRATORY (INHALATION) EVERY 4 HOURS PRN
Status: DISCONTINUED | OUTPATIENT
Start: 2019-03-15 | End: 2019-03-18 | Stop reason: HOSPADM

## 2019-03-15 RX ORDER — FLUTICASONE PROPIONATE 110 UG/1
2 AEROSOL, METERED RESPIRATORY (INHALATION)
Status: DISCONTINUED | OUTPATIENT
Start: 2019-03-15 | End: 2019-03-16

## 2019-03-15 RX ORDER — LISINOPRIL 20 MG/1
20 TABLET ORAL DAILY
Status: DISCONTINUED | OUTPATIENT
Start: 2019-03-16 | End: 2019-03-18 | Stop reason: HOSPADM

## 2019-03-15 RX ORDER — ALBUTEROL SULFATE 2.5 MG/3ML
2.5 SOLUTION RESPIRATORY (INHALATION)
Status: DISCONTINUED | OUTPATIENT
Start: 2019-03-15 | End: 2019-03-17

## 2019-03-15 RX ORDER — FENOFIBRATE 48 MG/1
48 TABLET, COATED ORAL DAILY
Status: DISCONTINUED | OUTPATIENT
Start: 2019-03-16 | End: 2019-03-18 | Stop reason: HOSPADM

## 2019-03-15 RX ORDER — BENZONATATE 100 MG/1
100 CAPSULE ORAL 3 TIMES DAILY PRN
Status: DISCONTINUED | OUTPATIENT
Start: 2019-03-15 | End: 2019-03-18 | Stop reason: HOSPADM

## 2019-03-15 RX ORDER — DOCUSATE SODIUM 100 MG/1
100 CAPSULE, LIQUID FILLED ORAL 2 TIMES DAILY
Status: DISCONTINUED | OUTPATIENT
Start: 2019-03-15 | End: 2019-03-18 | Stop reason: HOSPADM

## 2019-03-15 RX ORDER — MONTELUKAST SODIUM 10 MG/1
10 TABLET ORAL
Status: DISCONTINUED | OUTPATIENT
Start: 2019-03-15 | End: 2019-03-18 | Stop reason: HOSPADM

## 2019-03-15 RX ORDER — MAGNESIUM SULFATE HEPTAHYDRATE 40 MG/ML
2 INJECTION, SOLUTION INTRAVENOUS ONCE
Status: COMPLETED | OUTPATIENT
Start: 2019-03-15 | End: 2019-03-15

## 2019-03-15 RX ORDER — METHYLPREDNISOLONE SODIUM SUCCINATE 40 MG/ML
40 INJECTION, POWDER, LYOPHILIZED, FOR SOLUTION INTRAMUSCULAR; INTRAVENOUS ONCE
Status: COMPLETED | OUTPATIENT
Start: 2019-03-15 | End: 2019-03-15

## 2019-03-15 RX ORDER — MELATONIN
1000 DAILY
Status: DISCONTINUED | OUTPATIENT
Start: 2019-03-16 | End: 2019-03-18 | Stop reason: HOSPADM

## 2019-03-15 RX ADMIN — ISODIUM CHLORIDE 12 ML: 0.03 SOLUTION RESPIRATORY (INHALATION) at 16:18

## 2019-03-15 RX ADMIN — ALBUTEROL SULFATE 10 MG: 2.5 SOLUTION RESPIRATORY (INHALATION) at 16:16

## 2019-03-15 RX ADMIN — ALBUTEROL SULFATE 2.5 MG: 2.5 SOLUTION RESPIRATORY (INHALATION) at 21:14

## 2019-03-15 RX ADMIN — SODIUM CHLORIDE 125 ML/HR: 0.9 INJECTION, SOLUTION INTRAVENOUS at 20:52

## 2019-03-15 RX ADMIN — MAGNESIUM SULFATE IN WATER 2 G: 40 INJECTION, SOLUTION INTRAVENOUS at 15:28

## 2019-03-15 RX ADMIN — IPRATROPIUM BROMIDE AND ALBUTEROL SULFATE 3 ML: 2.5; .5 SOLUTION RESPIRATORY (INHALATION) at 15:28

## 2019-03-15 RX ADMIN — SODIUM CHLORIDE 1000 ML: 0.9 INJECTION, SOLUTION INTRAVENOUS at 15:28

## 2019-03-15 RX ADMIN — GUAIFENESIN 600 MG: 600 TABLET, EXTENDED RELEASE ORAL at 21:28

## 2019-03-15 RX ADMIN — MONTELUKAST SODIUM 10 MG: 10 TABLET, FILM COATED ORAL at 21:28

## 2019-03-15 RX ADMIN — METHYLPREDNISOLONE SODIUM SUCCINATE 40 MG: 40 INJECTION, POWDER, FOR SOLUTION INTRAMUSCULAR; INTRAVENOUS at 15:28

## 2019-03-15 RX ADMIN — IPRATROPIUM BROMIDE 1 MG: 0.5 SOLUTION RESPIRATORY (INHALATION) at 16:18

## 2019-03-15 RX ADMIN — METHYLPREDNISOLONE SODIUM SUCCINATE 40 MG: 40 INJECTION, POWDER, FOR SOLUTION INTRAMUSCULAR; INTRAVENOUS at 21:28

## 2019-03-15 NOTE — ED NOTES
Peak flow before treatment: 150  Peak flow after treatment 165    Dr Moran Bones notified     Janie Padilla RN  03/15/19 [id number]

## 2019-03-15 NOTE — ED PROVIDER NOTES
History  Chief Complaint   Patient presents with    Shortness of Breath     Patient states she was diagnosed with pneumonia last week and followed up with PCP and placed on steroids and antibiotic  Patient states she is not feeling any better     This is a 61-year-old female presents with cough congestion and shortness of breath over the past week she was seen at urgent care and also her primary care physician's office she was given a course of Zithromax and prednisone she is using inhalers every 2 hours and a nebulized machine at bedtime  She presents now with no improvement she is having difficulty sleeping lying down secondary to the shortness of breath she has a nonproductive cough denies any fevers or chills  She has bilateral wheezing on examination although her O2 saturation remains good at 96% on room air  History provided by:  Patient  Shortness of Breath   Severity:  Severe  Onset quality:  Gradual  Duration:  1 week  Timing:  Constant  Progression:  Unchanged  Chronicity:  Recurrent  Context: URI    Relieved by:  Nothing  Worsened by:  Exertion  Ineffective treatments:  Inhaler  Associated symptoms: cough and wheezing    Associated symptoms: no abdominal pain, no fever and no sputum production    Risk factors: tobacco use        Prior to Admission Medications   Prescriptions Last Dose Informant Patient Reported? Taking?    TALTZ 80 MG/ML SOAJ   Yes Yes   Sig: Every other wk x 12 wks then once a month    albuterol (PROVENTIL HFA,VENTOLIN HFA) 90 mcg/act inhaler   No Yes   Sig: Inhale 2 puffs every 4 (four) hours as needed for wheezing or shortness of breath   cholecalciferol (VITAMIN D3) 1,000 units tablet   Yes Yes   Sig: Take 1 tablet by mouth daily   fenofibrate micronized (LOFIBRA) 200 MG capsule   No Yes   Sig: Take 1 capsule (200 mg total) by mouth daily for 90 days   fluticasone-salmeterol (AIRDUO RESPICLICK) 84-41 mcg/act dry powder inhaler   No Yes   Sig: INHALE 1 PUFF TWICE DAILY levothyroxine 200 mcg tablet   No Yes   Sig: Take 1 tablet (200 mcg total) by mouth daily   lisinopril (ZESTRIL) 20 mg tablet   No Yes   Sig: Take 1 tablet (20 mg total) by mouth daily   metFORMIN (GLUCOPHAGE-XR) 500 mg 24 hr tablet   No Yes   Sig: Take 3 tablets (1,500 mg total) by mouth daily with breakfast   predniSONE 10 mg tablet   No Yes   Sig: Take 3 tablets for 2 days, 2 tablets for 2 days and 1 tablet for 2 days   predniSONE 10 mg tablet Not Taking at Unknown time  No No   Sig: 3 tab PO bid x 2 days  then 2 tab PO bid x 3 days then 1 tab PO bid x 3 days then 1 tab PO q day x 3 days then stop   Patient not taking: Reported on 3/15/2019   varenicline (CHANTIX STARTING MONTH ) 0 5 MG X 11 & 1 MG X 42 tablet Not Taking at Unknown time  Yes No   Sig: Take 1 Package by mouth see administration instructions      Facility-Administered Medications: None       Past Medical History:   Diagnosis Date    Anxiety     Disease of thyroid gland     Hypertension     Psoriasis        Past Surgical History:   Procedure Laterality Date     SECTION, LOW TRANSVERSE      COLONOSCOPY      colo 16 - polyp at sigmoid colon - 6 yr if adenoma or 10 yr if hyperplastic pathology: early hyperplastic changes     GALLBLADDER SURGERY      LAPAROSCOPY FOR ECTOPIC PREGNANCY      With excision    THYROID SURGERY         Family History   Problem Relation Age of Onset    Asthma Mother     Diabetes Father     Hypertension Father     Hypertension Family     Neuropathy Family     Stroke Family     Thyroid disease Family      I have reviewed and agree with the history as documented  Social History     Tobacco Use    Smoking status: Current Every Day Smoker     Types: Cigarettes    Smokeless tobacco: Never Used   Substance Use Topics    Alcohol use: Yes     Comment: Social drinker    Drug use: No        Review of Systems   Constitutional: Negative for fever     Respiratory: Positive for cough, shortness of breath and wheezing  Negative for sputum production  Gastrointestinal: Negative for abdominal pain  All other systems reviewed and are negative  Physical Exam  Physical Exam   Constitutional: She is oriented to person, place, and time  She appears well-developed and well-nourished  Non-toxic appearance  HENT:   Head: Normocephalic and atraumatic  Mouth/Throat: No posterior oropharyngeal edema  Eyes: Pupils are equal, round, and reactive to light  EOM are normal    Neck: Neck supple  No JVD present  Cardiovascular: Normal rate  Exam reveals no friction rub  No murmur heard  Pulmonary/Chest: She is in respiratory distress  She has wheezes in the right upper field, the right middle field, the right lower field, the left upper field, the left middle field and the left lower field  She has no rhonchi  She has no rales  Abdominal: Soft  She exhibits no distension  There is no tenderness  There is no rebound and no guarding  Musculoskeletal:        Right lower leg: Normal  She exhibits no tenderness and no edema  Left lower leg: Normal  She exhibits no tenderness and no edema  Neurological: She is alert and oriented to person, place, and time  Skin: Skin is warm and dry  No rash noted  Psychiatric: She has a normal mood and affect  Her behavior is normal  Her mood appears not anxious  She is not agitated  Nursing note and vitals reviewed        Vital Signs  ED Triage Vitals [03/15/19 1510]   Temperature Pulse Respirations Blood Pressure SpO2   (!) 97 1 °F (36 2 °C) 80 (!) 24 168/88 96 %      Temp src Heart Rate Source Patient Position - Orthostatic VS BP Location FiO2 (%)   -- -- -- -- --      Pain Score       No Pain           Vitals:    03/15/19 1510   BP: 168/88   Pulse: 80       qSOFA     Row Name 03/15/19 1510                Altered mental status GCS < 15          Respiratory Rate > / =34  1        Systolic BP < / =752  0        Q Sofa Score  1              Visual Acuity      ED Medications  Medications   sodium chloride 0 9 % bolus 1,000 mL (1,000 mL Intravenous New Bag 3/15/19 1528)   albuterol inhalation solution 10 mg (has no administration in time range)   ipratropium (ATROVENT) 0 02 % inhalation solution 1 mg (has no administration in time range)   sodium chloride 0 9 % inhalation solution 12 mL (has no administration in time range)   ipratropium-albuterol (DUO-NEB) 0 5-2 5 mg/3 mL inhalation solution 3 mL (3 mL Nebulization Given 3/15/19 1528)   methylPREDNISolone sodium succinate (Solu-MEDROL) injection 40 mg (40 mg Intravenous Given 3/15/19 1528)   magnesium sulfate 2 g/50 mL IVPB (premix) 2 g (0 g Intravenous Stopped 3/15/19 1552)       Diagnostic Studies  Results Reviewed     Procedure Component Value Units Date/Time    Comprehensive metabolic panel [[id number]] Collected:  03/15/19 1528    Lab Status:  Final result Specimen:  Blood from Arm, Right Updated:  03/15/19 1558     Sodium 141 mmol/L      Potassium 4 1 mmol/L      Chloride 105 mmol/L      CO2 27 mmol/L      ANION GAP 9 mmol/L      BUN 24 mg/dL      Creatinine 0 96 mg/dL      Glucose 88 mg/dL      Calcium 9 2 mg/dL      AST 18 U/L      ALT 31 U/L      Alkaline Phosphatase 72 U/L      Total Protein 8 0 g/dL      Albumin 3 7 g/dL      Total Bilirubin 0 20 mg/dL      eGFR 66 ml/min/1 73sq m     Narrative:       National Kidney Disease Education Program recommendations are as follows:  GFR calculation is accurate only with a steady state creatinine  Chronic Kidney disease less than 60 ml/min/1 73 sq  meters  Kidney failure less than 15 ml/min/1 73 sq  meters      Magnesium [[id number]]  (Normal) Collected:  03/15/19 1528    Lab Status:  Final result Specimen:  Blood from Arm, Right Updated:  03/15/19 1558     Magnesium 2 0 mg/dL     CBC and differential [[id number]]  (Abnormal) Collected:  03/15/19 1528    Lab Status:  Final result Specimen:  Blood from Arm, Right Updated:  03/15/19 1539     WBC 14 36 Thousand/uL RBC 4 61 Million/uL      Hemoglobin 14 4 g/dL      Hematocrit 41 8 %      MCV 91 fL      MCH 31 2 pg      MCHC 34 4 g/dL      RDW 12 6 %      MPV 10 0 fL      Platelets 157 Thousands/uL      nRBC 0 /100 WBCs      Neutrophils Relative 65 %      Immat GRANS % 2 %      Lymphocytes Relative 23 %      Monocytes Relative 9 %      Eosinophils Relative 0 %      Basophils Relative 1 %      Neutrophils Absolute 9 41 Thousands/µL      Immature Grans Absolute 0 32 Thousand/uL      Lymphocytes Absolute 3 28 Thousands/µL      Monocytes Absolute 1 26 Thousand/µL      Eosinophils Absolute 0 01 Thousand/µL      Basophils Absolute 0 08 Thousands/µL                  XR chest 2 views   ED Interpretation by Park Hyatt DO (03/15 1555)   No acute infiltrate or cardiomegaly      Final Result by Hemalatha Adam MD (03/15 1556)      No focal consolidation, pleural effusion, or pneumothorax              Workstation performed: [id number]                    Procedures  ECG 12 Lead Documentation  Date/Time: 3/15/2019 3:51 PM  Performed by: Park Hyatt DO  Authorized by: Park Hyatt DO     ECG reviewed by me, the ED Provider: yes    Patient location:  ED  Rate:     ECG rate:  73  Rhythm:     Rhythm: sinus rhythm    Conduction:     Conduction: normal    T waves:     T waves: non-specific      CriticalCare Time  Performed by: Park Hyatt DO  Authorized by: Park Hyatt DO     Critical care provider statement:     Critical care time (minutes):  30    Critical care time was exclusive of:  Separately billable procedures and treating other patients    Critical care was necessary to treat or prevent imminent or life-threatening deterioration of the following conditions:  Respiratory failure (Asthma exacerbation)    Critical care was time spent personally by me on the following activities:  Obtaining history from patient or surrogate, development of treatment plan with patient or surrogate, evaluation of patient's response to treatment, examination of patient, ordering and performing treatments and interventions, ordering and review of laboratory studies, ordering and review of radiographic studies and re-evaluation of patient's condition    I assumed direction of critical care for this patient from another provider in my specialty: no             Phone Contacts  ED Phone Contact    ED Course  ED Course as of Mar 15 1613   Fri Mar 15, 2019   1610 Patient feels unchanged her peak flow after treatment was 165 which is 43% of predicted she still has bilateral wheezing on examination will give her an hour long mini neb treatment and call for admission                                  MDM  Number of Diagnoses or Management Options  Diagnosis management comments: Upper respiratory infection with asthma exacerbation failure to outpatient medication will give IV steroids fluids nebulized treatment and magnesium and recheck chest x-ray       Amount and/or Complexity of Data Reviewed  Clinical lab tests: ordered  Tests in the radiology section of CPT®: ordered        Disposition  Final diagnoses:   None     ED Disposition     None      Follow-up Information    None         Patient's Medications   Discharge Prescriptions    No medications on file     No discharge procedures on file      ED Provider  Electronically Signed by           America Liu, DO  03/15/19 2982 [facility],[address], DO  03/18/19 3418

## 2019-03-15 NOTE — ED NOTES
Floor charge notified of admission   Dr Guardado Body at bedside     Concepcion Rodriguez, JOAN  03/15/19 3260

## 2019-03-15 NOTE — H&P
H&P Exam - Reddy Hernandez 66 y o  female MRN: [id number]    Unit/Bed#: ED 01 Encounter: [id number]      Assessment/Plan     1  Acute exacerbation of asthma-unresponsive to outpatient treatment-IV steroids and monitor peak flows regularly  2  Hypertension-continue usual meds  3  Recent viral illness  4  Tobacco abuse-will place on nicotine patch and meds encouraged in cessation  History of Present Illness     HPI:  Reddy Hernandez is a 66 y o  female who presents with  severe exacerbation of asthma  Patient reports that she started getting ill over 2 weeks ago when her young grandchild had also been sick  She developed increasing cough but no discolored sputum  She did have chest x-ray 2019 and was started on steroids at that time  She was then seen by her primary Dr  Monica Jorgensen and had worsening of symptoms today prompting her to come to the ER today  Patient has had ongoing wheezing despite heart neb and will be admitted for continued IV steroids and aggressive pulmonary treatment  She reports she started developing asthma symptoms approximately 3 years ago  She tends to have more allergies in the spring in her son also has spring allergies  Patient denies any lightheadedness or dizziness  She feels generally weak    She denies discolored sputum or fever chills at this time    PCP: Eliezer Louie DO    Review of Systems    Historical Information   Past Medical History:   Diagnosis Date    Anxiety     Disease of thyroid gland     Hypertension     Psoriasis      Past Surgical History:   Procedure Laterality Date     SECTION, LOW TRANSVERSE      COLONOSCOPY      colo 14 - polyp at sigmoid colon - 6 yr if adenoma or 10 yr if hyperplastic pathology: early hyperplastic changes     GALLBLADDER SURGERY      LAPAROSCOPY FOR ECTOPIC PREGNANCY      With excision    THYROID SURGERY       Social History   Social History     Substance and Sexual Activity   Alcohol Use Yes    Comment: Social drinker     Social History     Substance and Sexual Activity   Drug Use No     Social History     Tobacco Use   Smoking Status Current Every Day Smoker    Types: Cigarettes   Smokeless Tobacco Never Used     Family History:   Family History   Problem Relation Age of Onset    Asthma Mother     Diabetes Father     Hypertension Father     Hypertension Family     Neuropathy Family     Stroke Family     Thyroid disease Family        Meds/Allergies   PTA meds:   Prior to Admission Medications   Prescriptions Last Dose Informant Patient Reported? Taking?    TALTZ 80 MG/ML SOAJ   Yes Yes   Sig: Every other wk x 12 wks then once a month    albuterol (PROVENTIL HFA,VENTOLIN HFA) 90 mcg/act inhaler   No Yes   Sig: Inhale 2 puffs every 4 (four) hours as needed for wheezing or shortness of breath   cholecalciferol (VITAMIN D3) 1,000 units tablet   Yes Yes   Sig: Take 1 tablet by mouth daily   fenofibrate micronized (LOFIBRA) 200 MG capsule   No Yes   Sig: Take 1 capsule (200 mg total) by mouth daily for 90 days   fluticasone-salmeterol (AIRDUO RESPICLICK) 69-35 mcg/act dry powder inhaler   No Yes   Sig: INHALE 1 PUFF TWICE DAILY   levothyroxine 200 mcg tablet   No Yes   Sig: Take 1 tablet (200 mcg total) by mouth daily   lisinopril (ZESTRIL) 20 mg tablet   No Yes   Sig: Take 1 tablet (20 mg total) by mouth daily   metFORMIN (GLUCOPHAGE-XR) 500 mg 24 hr tablet   No Yes   Sig: Take 3 tablets (1,500 mg total) by mouth daily with breakfast   predniSONE 10 mg tablet   No Yes   Sig: Take 3 tablets for 2 days, 2 tablets for 2 days and 1 tablet for 2 days   predniSONE 10 mg tablet Not Taking at Unknown time  No No   Sig: 3 tab PO bid x 2 days  then 2 tab PO bid x 3 days then 1 tab PO bid x 3 days then 1 tab PO q day x 3 days then stop   Patient not taking: Reported on 3/15/2019   varenicline (CHANTIX STARTING MONTH PAK) 0 5 MG X 11 & 1 MG X 42 tablet Not Taking at Unknown time  Yes No   Sig: Take 1 Package by mouth see administration instructions      Facility-Administered Medications: None     Allergies   Allergen Reactions    Methimazole Arthralgia     Reaction Date: [id number]; Annotation - [id number]: Joint pain       Objective   Vitals: Blood pressure 168/88, pulse 80, temperature (!) 97 1 °F (36 2 °C), resp  rate (!) 24, height 5' 3" (1 6 m), weight 83 9 kg (185 lb), SpO2 96 %, not currently breastfeeding  Intake/Output Summary (Last 24 hours) at 3/15/2019 1744  Last data filed at 3/15/2019 1715  Gross per 24 hour   Intake 1050 ml   Output    Net 1050 ml       Invasive Devices          None          Physical Exam   Constitutional: She is oriented to person, place, and time  She appears well-developed and well-nourished  She appears distressed  Patient currently having heart now have been using accessory muscles for breathing   HENT:   Head: Normocephalic  Nose: Nose normal    Mouth/Throat: No oropharyngeal exudate  Eyes: Pupils are equal, round, and reactive to light  Conjunctivae are normal  Right eye exhibits no discharge  Left eye exhibits no discharge  Neck: No tracheal deviation present  No thyromegaly present  Cardiovascular: Normal rate and regular rhythm  Exam reveals no friction rub  No murmur heard  Pulmonary/Chest: No stridor  No respiratory distress  She has wheezes  Scattered rhonchi in the upper airway   Abdominal: Soft  Bowel sounds are normal  She exhibits no distension  There is no tenderness  There is no rebound  Musculoskeletal: She exhibits no edema or deformity  Lymphadenopathy:     She has cervical adenopathy  Neurological: She is alert and oriented to person, place, and time  No cranial nerve deficit  Coordination normal    Skin: Skin is warm  No erythema  Psychiatric: She has a normal mood and affect  Her behavior is normal    Minimally anxious   Nursing note and vitals reviewed  Lab Results: I have personally reviewed pertinent reports      Imaging: I have personally reviewed pertinent reports  EKG, Pathology, and Other Studies: I have personally reviewed pertinent reports  Code Status: No Order  Advance Directive and Living Will:      Power of :    POLST:      Counseling / Coordination of Care  Total floor / unit time spent today 35 minutes  Greater than 50% of total time was spent with the patient and / or family counseling and / or coordination of care

## 2019-03-16 LAB
ANION GAP SERPL CALCULATED.3IONS-SCNC: 12 MMOL/L (ref 4–13)
BUN SERPL-MCNC: 20 MG/DL (ref 5–25)
CALCIUM SERPL-MCNC: 7.9 MG/DL (ref 8.3–10.1)
CHLORIDE SERPL-SCNC: 104 MMOL/L (ref 100–108)
CO2 SERPL-SCNC: 22 MMOL/L (ref 21–32)
CREAT SERPL-MCNC: 0.63 MG/DL (ref 0.6–1.3)
ERYTHROCYTE [DISTWIDTH] IN BLOOD BY AUTOMATED COUNT: 12.5 % (ref 11.6–15.1)
FLUAV AG SPEC QL: NOT DETECTED
FLUBV AG SPEC QL: NOT DETECTED
GFR SERPL CREATININE-BSD FRML MDRD: 101 ML/MIN/1.73SQ M
GLUCOSE SERPL-MCNC: 104 MG/DL (ref 65–140)
GLUCOSE SERPL-MCNC: 123 MG/DL (ref 65–140)
GLUCOSE SERPL-MCNC: 183 MG/DL (ref 65–140)
GLUCOSE SERPL-MCNC: 73 MG/DL (ref 65–140)
GLUCOSE SERPL-MCNC: 96 MG/DL (ref 65–140)
HCT VFR BLD AUTO: 38 % (ref 34.8–46.1)
HGB BLD-MCNC: 12.8 G/DL (ref 11.5–15.4)
MCH RBC QN AUTO: 31.4 PG (ref 26.8–34.3)
MCHC RBC AUTO-ENTMCNC: 33.7 G/DL (ref 31.4–37.4)
MCV RBC AUTO: 93 FL (ref 82–98)
PLATELET # BLD AUTO: 291 THOUSANDS/UL (ref 149–390)
PMV BLD AUTO: 10.5 FL (ref 8.9–12.7)
POTASSIUM SERPL-SCNC: 4 MMOL/L (ref 3.5–5.3)
RBC # BLD AUTO: 4.07 MILLION/UL (ref 3.81–5.12)
RSV B RNA SPEC QL NAA+PROBE: NOT DETECTED
SODIUM SERPL-SCNC: 138 MMOL/L (ref 136–145)
TSH SERPL DL<=0.05 MIU/L-ACNC: 1.32 UIU/ML (ref 0.36–3.74)
WBC # BLD AUTO: 13.67 THOUSAND/UL (ref 4.31–10.16)

## 2019-03-16 PROCEDURE — 99232 SBSQ HOSP IP/OBS MODERATE 35: CPT | Performed by: INTERNAL MEDICINE

## 2019-03-16 PROCEDURE — 94640 AIRWAY INHALATION TREATMENT: CPT

## 2019-03-16 PROCEDURE — 84443 ASSAY THYROID STIM HORMONE: CPT | Performed by: PHYSICIAN ASSISTANT

## 2019-03-16 PROCEDURE — 99254 IP/OBS CNSLTJ NEW/EST MOD 60: CPT | Performed by: INTERNAL MEDICINE

## 2019-03-16 PROCEDURE — 85027 COMPLETE CBC AUTOMATED: CPT | Performed by: INTERNAL MEDICINE

## 2019-03-16 PROCEDURE — 94760 N-INVAS EAR/PLS OXIMETRY 1: CPT

## 2019-03-16 PROCEDURE — 82948 REAGENT STRIP/BLOOD GLUCOSE: CPT

## 2019-03-16 PROCEDURE — 90682 RIV4 VACC RECOMBINANT DNA IM: CPT | Performed by: INTERNAL MEDICINE

## 2019-03-16 PROCEDURE — 80048 BASIC METABOLIC PNL TOTAL CA: CPT | Performed by: INTERNAL MEDICINE

## 2019-03-16 RX ORDER — PREDNISONE 20 MG/1
40 TABLET ORAL
Status: DISCONTINUED | OUTPATIENT
Start: 2019-03-17 | End: 2019-03-18 | Stop reason: HOSPADM

## 2019-03-16 RX ORDER — METHYLPREDNISOLONE SODIUM SUCCINATE 40 MG/ML
20 INJECTION, POWDER, LYOPHILIZED, FOR SOLUTION INTRAMUSCULAR; INTRAVENOUS EVERY 12 HOURS SCHEDULED
Status: COMPLETED | OUTPATIENT
Start: 2019-03-16 | End: 2019-03-16

## 2019-03-16 RX ORDER — METHYLPREDNISOLONE SODIUM SUCCINATE 40 MG/ML
40 INJECTION, POWDER, LYOPHILIZED, FOR SOLUTION INTRAMUSCULAR; INTRAVENOUS EVERY 12 HOURS SCHEDULED
Status: DISCONTINUED | OUTPATIENT
Start: 2019-03-16 | End: 2019-03-16

## 2019-03-16 RX ORDER — LEVALBUTEROL 1.25 MG/.5ML
1.25 SOLUTION, CONCENTRATE RESPIRATORY (INHALATION)
Status: DISCONTINUED | OUTPATIENT
Start: 2019-03-16 | End: 2019-03-18 | Stop reason: HOSPADM

## 2019-03-16 RX ORDER — ZOLPIDEM TARTRATE 5 MG/1
5 TABLET ORAL
Status: DISCONTINUED | OUTPATIENT
Start: 2019-03-16 | End: 2019-03-18 | Stop reason: HOSPADM

## 2019-03-16 RX ORDER — FAMOTIDINE 20 MG/1
20 TABLET, FILM COATED ORAL 2 TIMES DAILY
Status: DISCONTINUED | OUTPATIENT
Start: 2019-03-16 | End: 2019-03-18 | Stop reason: HOSPADM

## 2019-03-16 RX ADMIN — GUAIFENESIN 600 MG: 600 TABLET, EXTENDED RELEASE ORAL at 09:43

## 2019-03-16 RX ADMIN — FAMOTIDINE 20 MG: 20 TABLET ORAL at 11:40

## 2019-03-16 RX ADMIN — INFLUENZA A VIRUS A/MICHIGAN/45/2015 (H1N1) RECOMBINANT HEMAGGLUTININ ANTIGEN, INFLUENZA A VIRUS A/SINGAPORE/INFIMH-16-0019/2016 (H3N2) RECOMBINANT HEMAGGLUTININ ANTIGEN, INFLUENZA B VIRUS B/MARYLAND/15/2016 RECOMBINANT HEMAGGLUTININ ANTIGEN, AND INFLUENZA B VIRUS B/PHUKET/3073/2013 RECOMBINANT HEMAGGLUTININ ANTIGEN 0.5 ML: 45; 45; 45; 45 INJECTION INTRAMUSCULAR at 11:40

## 2019-03-16 RX ADMIN — FENOFIBRATE 48 MG: 48 TABLET ORAL at 09:45

## 2019-03-16 RX ADMIN — ALBUTEROL SULFATE 2.5 MG: 2.5 SOLUTION RESPIRATORY (INHALATION) at 07:52

## 2019-03-16 RX ADMIN — FLUTICASONE PROPIONATE 2 PUFF: 110 AEROSOL, METERED RESPIRATORY (INHALATION) at 07:52

## 2019-03-16 RX ADMIN — ALBUTEROL SULFATE 2.5 MG: 2.5 SOLUTION RESPIRATORY (INHALATION) at 13:35

## 2019-03-16 RX ADMIN — METFORMIN HYDROCHLORIDE 1500 MG: 750 TABLET, EXTENDED RELEASE ORAL at 09:42

## 2019-03-16 RX ADMIN — CHOLECALCIFEROL TAB 25 MCG (1000 UNIT) 1000 UNITS: 25 TAB at 09:43

## 2019-03-16 RX ADMIN — IPRATROPIUM BROMIDE 0.5 MG: 0.5 SOLUTION RESPIRATORY (INHALATION) at 20:32

## 2019-03-16 RX ADMIN — LEVALBUTEROL 1.25 MG: 1.25 SOLUTION, CONCENTRATE RESPIRATORY (INHALATION) at 20:33

## 2019-03-16 RX ADMIN — METHYLPREDNISOLONE SODIUM SUCCINATE 20 MG: 40 INJECTION, POWDER, FOR SOLUTION INTRAMUSCULAR; INTRAVENOUS at 21:46

## 2019-03-16 RX ADMIN — METHYLPREDNISOLONE SODIUM SUCCINATE 40 MG: 40 INJECTION, POWDER, FOR SOLUTION INTRAMUSCULAR; INTRAVENOUS at 05:46

## 2019-03-16 RX ADMIN — LEVOTHYROXINE SODIUM 200 MCG: 100 TABLET ORAL at 06:09

## 2019-03-16 RX ADMIN — LISINOPRIL 20 MG: 20 TABLET ORAL at 09:43

## 2019-03-16 RX ADMIN — SODIUM CHLORIDE 125 ML/HR: 0.9 INJECTION, SOLUTION INTRAVENOUS at 11:43

## 2019-03-16 RX ADMIN — FAMOTIDINE 20 MG: 20 TABLET ORAL at 18:47

## 2019-03-16 RX ADMIN — MONTELUKAST SODIUM 10 MG: 10 TABLET, FILM COATED ORAL at 21:46

## 2019-03-16 RX ADMIN — SODIUM CHLORIDE 125 ML/HR: 0.9 INJECTION, SOLUTION INTRAVENOUS at 04:50

## 2019-03-16 RX ADMIN — FLUTICASONE FUROATE AND VILANTEROL TRIFENATATE 1 PUFF: 200; 25 POWDER RESPIRATORY (INHALATION) at 07:52

## 2019-03-16 RX ADMIN — GUAIFENESIN 600 MG: 600 TABLET, EXTENDED RELEASE ORAL at 21:46

## 2019-03-16 NOTE — CONSULTS
Pulmonary Consultation   Alvaro Rader 66 y o  female MRN: [id number]  Unit/Bed#: [address] [place] [address] 208-02 Encounter: [id number]      Reason for consultation:  Asthma exacerbation    Requesting physician: Dr Guardado Body    Impressions/Recommendations:  1  Moderate persistent asthma with acute exacerbation likely secondary to recent tracheobronchitis-status post five-day course of azithromycin  1  Decrease Solu-Medrol to 40 mg b i d  With plan transition to prednisone taper tomorrow starting at 40 mg x3 days with reduction by 10 mg every 3 days  2  Discontinue Flovent, continue Breo  3  Add Xopenex/Atrovent nebulized treatments t i d   4  Pulmonary toilet:  Incentive spirometry, out of bed with increasing ambulation as tolerated  5  Titrate oxygen as needed to maintain SpO2 greater than equal to 90%  6  No further antibiotic therapy warranted at this time  7  Recommend outpatient pulmonary follow-up, information and discharge schedule appointment  2  Dyspnea  1  Secondary to 1  -treatment as indicated above  3  GERD  1  Add Pepcid 20 mg b i d  History of Present Illness   HPI:  Alvaro Rader is a 66 y o  female seen in consult for asthma exacerbation  She has a past medical history significant for:  Diabetes, Hypertension, hypothyroidism, anxiety, psoriasis and diagnosis of moderate persistent asthma  She was seen 1 week prior by her primary care physician with symptoms of fever T-max of 101°, cough, shortness of breath and chest tightness as well as wheezing  At this time she was provided a Z-Latrell and five-day course of prednisone therapy  She denies any perceived they benefit with above treatment presented to the ED  Chest x-ray in the ED was negative for acute process    She was noted to be wheezing she was started on Solu-Medrol, nebulized bronchodilators admitted for further evaluation    At the time of today's evaluation she reports improvement in her symptoms, although she does report persistent wheezing and shortness of breath above her baseline  She currently denies:  Chest pain, pain inspiration, fevers, chills, night sweats, nausea vomiting diarrhea headache, dizziness or hemoptysis  She denies sputum production but reports her cough is above her baseline  From pulmonary standpoint she has a diagnosis of moderate persistent asthma follows outpatient with her primary care physician  She maintains on a regimen of Airduo BID, and recently received a albuterol rescue inhaler that she has been using estimated 2 times daily  She does not follow up patient with pulmonologist   She does report that she was diagnosed with asthma for years prior prior to that she had no significant lung issues  At baseline she reports she is able to walk an estimated 1 mi in the months rest   She reports nighttime awakening 1 time per week, and mild limitation to daily activities  Denies symptoms of:  Dysphagia, obstructive sleep apnea  She does report persistent GERD symptoms, and is not currently on medical treatment  Denies any significant environmental allergies  She reports a quit date for smoking of 2019 prior to that a quarter pack per day smoker for for 30 years with a 7 5 pack year smoking history  Review of systems:  12 point review of systems was completed and was otherwise negative except as listed in HPI        Historical Information   Past Medical History:   Diagnosis Date    Anxiety     Disease of thyroid gland     Hypertension     Psoriasis      Past Surgical History:   Procedure Laterality Date     SECTION, LOW TRANSVERSE      COLONOSCOPY      colo 16 - polyp at sigmoid colon - 6 yr if adenoma or 10 yr if hyperplastic pathology: early hyperplastic changes     GALLBLADDER SURGERY      LAPAROSCOPY FOR ECTOPIC PREGNANCY      With excision    THYROID SURGERY       Family History   Problem Relation Age of Onset    Asthma Mother     Diabetes Father     Hypertension Father     Hypertension Family     Neuropathy Family     Stroke Family     Thyroid disease Family        Occupational history:  Noncontributory    Tobacco history:  7 5 pack year smoking history quit date 03/04/2019    Meds/Allergies   Current Facility-Administered Medications   Medication Dose Route Frequency    acetaminophen (TYLENOL) tablet 650 mg  650 mg Oral Q6H PRN    albuterol (PROVENTIL HFA,VENTOLIN HFA) inhaler 2 puff  2 puff Inhalation Q4H PRN    albuterol inhalation solution 2 5 mg  2 5 mg Nebulization TID    benzonatate (TESSALON PERLES) capsule 100 mg  100 mg Oral TID PRN    cholecalciferol (VITAMIN D3) tablet 1,000 Units  1,000 Units Oral Daily    docusate sodium (COLACE) capsule 100 mg  100 mg Oral BID    fenofibrate (TRICOR) tablet 48 mg  48 mg Oral Daily    fluticasone (FLOVENT HFA) 110 MCG/ACT inhaler 2 puff  2 puff Inhalation BID    fluticasone-vilanterol (BREO ELLIPTA) 200-25 MCG/INH inhaler 1 puff  1 puff Inhalation Daily    guaiFENesin (MUCINEX) 12 hr tablet 600 mg  600 mg Oral Q12H    influenza vaccine, recombinant, quadrivalent (FLUBLOK) IM injection 0 5 mL  0 5 mL Intramuscular Prior to discharge    insulin lispro (HumaLOG) 100 units/mL subcutaneous injection 1-5 Units  1-5 Units Subcutaneous HS    levothyroxine tablet 200 mcg  200 mcg Oral Daily    lisinopril (ZESTRIL) tablet 20 mg  20 mg Oral Daily    metFORMIN (GLUCOPHAGE-XR) 24 hr tablet 1,500 mg  1,500 mg Oral Daily With Breakfast    methylPREDNISolone sodium succinate (Solu-MEDROL) injection 40 mg  40 mg Intravenous Q12H RAVIN    montelukast (SINGULAIR) tablet 10 mg  10 mg Oral HS    nicotine (NICODERM CQ) 7 mg/24hr TD 24 hr patch 1 patch  1 patch Transdermal Daily    ondansetron (ZOFRAN) injection 4 mg  4 mg Intravenous Q6H PRN    [START ON 3/17/2019] predniSONE tablet 40 mg  40 mg Oral Daily With Breakfast    sodium chloride 0 9 % infusion  125 mL/hr Intravenous Continuous     Medications Prior to Admission   Medication    albuterol (PROVENTIL HFA,VENTOLIN HFA) 90 mcg/act inhaler    cholecalciferol (VITAMIN D3) 1,000 units tablet    fenofibrate micronized (LOFIBRA) 200 MG capsule    fluticasone-salmeterol (AIRDUO RESPICLICK) 53-38 mcg/act dry powder inhaler    levothyroxine 200 mcg tablet    lisinopril (ZESTRIL) 20 mg tablet    metFORMIN (GLUCOPHAGE-XR) 500 mg 24 hr tablet    predniSONE 10 mg tablet    TALTZ 80 MG/ML SOAJ    predniSONE 10 mg tablet    varenicline (CHANTIX STARTING MONTH PAK) 0 5 MG X 11 & 1 MG X 42 tablet     Allergies   Allergen Reactions    Methimazole Arthralgia     Reaction Date: [id number]; Annotation - [id number]: Joint pain       Vitals: Blood pressure 136/70, pulse 73, temperature 97 6 °F (36 4 °C), temperature source Oral, resp  rate 18, height 5' 3" (1 6 m), weight 85 kg (187 lb 6 3 oz), SpO2 98 %, not currently breastfeeding , ra, Body mass index is 33 19 kg/m²        Intake/Output Summary (Last 24 hours) at 3/16/2019 0944  Last data filed at 3/16/2019 0601  Gross per 24 hour   Intake 2593 75 ml   Output 300 ml   Net 2293 75 ml       Physical exam:    General Appearance:    Alert, cooperative, no conversational dyspnea no accessory     muscle use       Head/eyes:    Normocephalic, without obvious abnormality, atraumatic,         PERRL, extraocular muscles intact, no scleral icterus    Nose:   Nares normal, septum midline, mucosa normal, no drainage    or sinus tenderness   Throat:   Moist mucous membranes, no thrush   Neck:   Supple, trachea midline, no adenopathy; no carotid    Bruit or JVD   Lungs:     Expiratory wheezes noted throughout all lung fields, no rhonchi or rales appreciated   Chest Wall:    No tenderness or deformity    Heart:    Regular rate and rhythm, S1 and S2 normal, no murmur, rub   or gallop   Abdomen:     Obese Soft, non-tender, bowel sounds active all four quadrants,     no masses, no organomegaly   Extremities:   Extremities normal, atraumatic, no cyanosis no edema   Skin:   Warm, dry, turgor normal, no rashes or lesions   Lymph nodes:   Cervical and supraclavicular nodes normal   Neurologic:   CNII-XII intact, normal strength, non-focal         Labs: I have personally reviewed pertinent lab results  , CBC:   Lab Results   Component Value Date    WBC 13 67 (H) 03/16/2019    HGB 12 8 03/16/2019    HCT 38 0 03/16/2019    MCV 93 03/16/2019     03/16/2019    MCH 31 4 03/16/2019    MCHC 33 7 03/16/2019    RDW 12 5 03/16/2019    MPV 10 5 03/16/2019    NRBC 0 03/15/2019   , CMP:   Lab Results   Component Value Date    SODIUM 138 03/16/2019    K 4 0 03/16/2019     03/16/2019    CO2 22 03/16/2019    BUN 20 03/16/2019    CREATININE 0 63 03/16/2019    CALCIUM 7 9 (L) 03/16/2019    AST 18 03/15/2019    ALT 31 03/15/2019    ALKPHOS 72 03/15/2019    EGFR 101 03/16/2019       Imaging and other studies: I have personally reviewed pertinent reports   , I have personally reviewed pertinent films in PACS and Chest x-ray 03/15/2019:  No acute cardiopulmonary process    Pulmonary function testing:  None    EKG, Pathology, and Other Studies: I have personally reviewed pertinent reports     and EKG 03/15/2019:  Normal sinus rhythm    Code Status: Level 1 - Full Code      REYMUNDO Martinez

## 2019-03-16 NOTE — PLAN OF CARE
Problem: DISCHARGE PLANNING - CARE MANAGEMENT  Goal: Discharge to post-acute care or home with appropriate resources  Description  INTERVENTIONS:  - Conduct assessment to determine patient/family and health care team treatment goals, and need for post-acute services based on payer coverage, community resources, and patient preferences, and barriers to discharge  - Address psychosocial, clinical, and financial barriers to discharge as identified in assessment in conjunction with the patient/family and health care team  - Arrange appropriate level of post-acute services according to patient?s   needs and preference and payer coverage in collaboration with the physician and health care team  - Communicate with and update the patient/family, physician, and health care team regarding progress on the discharge plan  - Arrange appropriate transportation to post-acute venues  Outcome: Progressing     Problem: RESPIRATORY - ADULT  Goal: Achieves optimal ventilation and oxygenation  Description  INTERVENTIONS:  - Assess for changes in respiratory status  - Assess for changes in mentation and behavior  - Position to facilitate oxygenation and minimize respiratory effort  - Oxygen administration by appropriate delivery method based on oxygen saturation (per order) or ABGs  - Initiate smoking cessation education as indicated  - Encourage broncho-pulmonary hygiene including cough, deep breathe, Incentive Spirometry  - Assess the need for suctioning and aspirate as needed  - Assess and instruct to report SOB or any respiratory difficulty  - Respiratory Therapy support as indicated  Outcome: Progressing     Problem: METABOLIC, FLUID AND ELECTROLYTES - ADULT  Goal: Electrolytes maintained within normal limits  Description  INTERVENTIONS:  - Monitor labs and assess patient for signs and symptoms of electrolyte imbalances  - Administer electrolyte replacement as ordered  - Monitor response to electrolyte replacements, including repeat lab results as appropriate  - Instruct patient on fluid and nutrition as appropriate  Outcome: Progressing  Goal: Fluid balance maintained  Description  INTERVENTIONS:  - Monitor labs and assess for signs and symptoms of volume excess or deficit  - Monitor I/O and WT  - Instruct patient on fluid and nutrition as appropriate  Outcome: Progressing  Goal: Glucose maintained within target range  Description  INTERVENTIONS:  - Monitor Blood Glucose as ordered  - Assess for signs and symptoms of hyperglycemia and hypoglycemia  - Administer ordered medications to maintain glucose within target range  - Assess nutritional intake and initiate nutrition service referral as needed  Outcome: Progressing

## 2019-03-16 NOTE — RESPIRATORY THERAPY NOTE
RT Protocol Note  Hugo Taylor 66 y o  female MRN: [id number]  Unit/Bed#: [address] [place] [address] 208-02 Encounter: [id number]    Assessment    Principal Problem: Moderate persistent asthma with acute exacerbation  Active Problems:    Controlled type 2 diabetes mellitus without complication, without long-term current use of insulin (HCC)    Essential hypertriglyceridemia    Other specified hypothyroidism      Home Pulmonary Medications:  yes       Past Medical History:   Diagnosis Date    Anxiety     Disease of thyroid gland     Hypertension     Psoriasis      Social History     Socioeconomic History    Marital status:      Spouse name: None    Number of children: 1    Years of education: None    Highest education level: None   Occupational History    None   Social Needs    Financial resource strain: None    Food insecurity:     Worry: None     Inability: None    Transportation needs:     Medical: None     Non-medical: None   Tobacco Use    Smoking status: Current Every Day Smoker     Types: Cigarettes    Smokeless tobacco: Never Used   Substance and Sexual Activity    Alcohol use: Yes     Comment: Social drinker    Drug use: No    Sexual activity: Yes     Partners: Male     Birth control/protection: None   Lifestyle    Physical activity:     Days per week: None     Minutes per session: None    Stress: None   Relationships    Social connections:     Talks on phone: None     Gets together: None     Attends Scientologist service: None     Active member of club or organization: None     Attends meetings of clubs or organizations: None     Relationship status: None    Intimate partner violence:     Fear of current or ex partner: None     Emotionally abused: None     Physically abused: None     Forced sexual activity: None   Other Topics Concern    None   Social History Narrative    Caffeine Use      Occupation:    Description: worked for [employer] for 19 years  Pnor back injury 1999, treated With LESI and Lyn'BELLA  [employer] [address]     Uses Safety Equipment - Seatbelts        Subjective         Objective    Physical Exam:   Assessment Type: Pre-treatment  General Appearance: Alert, Awake  Respiratory Pattern: Normal  Chest Assessment: Chest expansion symmetrical  Bilateral Breath Sounds: Expiratory wheezes  Cough: None    Vitals:  Blood pressure 133/66, pulse 91, temperature 98 1 °F (36 7 °C), temperature source Oral, resp  rate 20, height 5' 3" (1 6 m), weight 85 kg (187 lb 6 3 oz), SpO2 96 %, not currently breastfeeding  Imaging and other studies: I have personally reviewed pertinent reports              Plan    Respiratory Plan: Home Bronchodilator Patient pathway

## 2019-03-16 NOTE — UTILIZATION REVIEW
Initial Clinical Review    Admission: Date/Time/Statement: 3/15/19 @ 1703   Orders Placed This Encounter   Procedures    Inpatient Admission (expected length of stay for this patient Order details is greater than two midnights)     Standing Status:   Standing     Number of Occurrences:   1     Order Specific Question:   Admitting Physician     Answer:   Guy Lee [[id number]]     Order Specific Question:   Level of Care     Answer:   Med Surg [[id number]]     Order Specific Question:   Estimated length of stay     Answer:   More than 2 Midnights     Order Specific Question:   Certification     Answer:   I certify that inpatient services are medically necessary for this patient for a duration of greater than two midnights  See H&P and MD Progress Notes for additional information about the patient's course of treatment  ED: Date/Time/Mode of Arrival:   ED Arrival Information     Expected Arrival Acuity Means of Arrival Escorted By Service Admission Type    - 3/15/2019 14:48 Urgent Walk-In [facility] Urgent    Arrival Complaint    cough, congestion        Chief Complaint:   Chief Complaint   Patient presents with    Shortness of Breath     Patient states she was diagnosed with pneumonia last week and followed up with PCP and placed on steroids and antibiotic  Patient states she is not feeling any better     Assessment/Plan: Acute exacerbation of asthma-unresponsive to outpatient treatment-IV steroids and monitor peak flows regularly  2  Hypertension-continue usual meds  3  Recent viral illness  4  Tobacco abuse-will place on nicotine patch and meds encouraged in cessation      66  Y O  female  Presents to  ED   From home  C/o increasing cough after feeling  Ill for past  2 weeks  +  Family  Sick contacts  She had an OP CXR  On  3/8 and was started om steroids  She  C/o  Ongoing wheezing  Despite  Heart nebs and  Came to ED  Started  With  Asthma  Symptoms  approx  3  Years  Ago      Patient currently having heart now have been using accessory muscles for breathing     Pulmonary/Chest: No stridor  No respiratory distress  She has wheezes  Scattered rhonchi in the upper airway         ED Vital Signs:   ED Triage Vitals   Temperature Pulse Respirations Blood Pressure SpO2   03/15/19 1510 03/15/19 1510 03/15/19 1510 03/15/19 1510 03/15/19 1510   (!) 97 1 °F (36 2 °C) 80 (!) 24 168/88 96 %      Temp Source Heart Rate Source Patient Position - Orthostatic VS BP Location FiO2 (%)   03/15/19 2017 03/15/19 1759 03/15/19 1759 03/15/19 1759 --   Oral Monitor Lying Right arm       Pain Score       03/15/19 1510       No Pain        Wt Readings from Last 1 Encounters:   03/15/19 85 kg (187 lb 6 3 oz)     Vital Signs (abnormal):     above    Pertinent Labs/Diagnostic Test Results:    WBC   14 36  Abs neutro    9 41  CXR:     No focal consolidation, pleural effusion, or pneumothorax      ED Treatment:   Medication Administration from 03/15/2019 1448 to 03/15/2019 2008       Date/Time Order Dose Route Action Action by Comments     03/15/2019 1715 sodium chloride 0 9 % bolus 1,000 mL 0 mL Intravenous Stopped Augusta Lima RN      03/15/2019 1528 sodium chloride 0 9 % bolus 1,000 mL 1,000 mL Intravenous [address] [facility]      03/15/2019 1528 ipratropium-albuterol (DUO-NEB) 0 5-2 5 mg/3 mL inhalation solution 3 mL 3 mL Nebulization Given Augusta Lima RN      03/15/2019 1528 methylPREDNISolone sodium succinate (Solu-MEDROL) injection 40 mg 40 mg Intravenous Given Augusta Lima RN      03/15/2019 1552 magnesium sulfate 2 g/50 mL IVPB (premix) 2 g 0 g Intravenous Stopped Augusta Lima RN      03/15/2019 1528 magnesium sulfate 2 g/50 mL IVPB (premix) 2 g 2 g Intravenous [address] Augusta Lima RN      03/15/2019 1616 albuterol inhalation solution 10 mg 10 mg Nebulization Given Augusta Lima RN      03/15/2019 1618 ipratropium (ATROVENT) 0 02 % inhalation solution 1 mg 1 mg Nebulization Given Adrián Spencer Kvng Atkins RN      03/15/2019 1618 sodium chloride 0 9 % inhalation solution 12 mL 12 mL Nebulization Given Florentino Hoff RN         Past Medical/Surgical History:     Admitting Diagnosis: Cough [R05]  Asthma exacerbation [J45 901]     Age/Sex: 66 y o  female     Admission Orders:    IP  3/15  @  1704  Scheduled Meds:   Current Facility-Administered Medications:  acetaminophen 650 mg Oral Q6H PRN Tiffany Fly, DO    albuterol 2 puff Inhalation Q4H PRN Tiffany Fly, DO    albuterol 2 5 mg Nebulization TID Tiffany Fly, DO    benzonatate 100 mg Oral TID PRN Soumya Porter, DO    cholecalciferol 1,000 Units Oral Daily Tiffany Fly, DO    docusate sodium 100 mg Oral BID Tiffany Fly, DO    fenofibrate 48 mg Oral Daily Tiffany Fly, DO    fluticasone 2 puff Inhalation BID Tiffany Fly, DO    fluticasone-vilanterol 1 puff Inhalation Daily Tiffany Fly, DO    guaiFENesin 600 mg Oral Q12H Tiffany Fly, DO    influenza vaccine 0 5 mL Intramuscular Prior to discharge Soumya Porter, DO    insulin lispro 1-5 Units Subcutaneous HS Tiffany Fly, DO    levothyroxine 200 mcg Oral Daily Tiffany Fly, DO    lisinopril 20 mg Oral Daily Tiffany Fly, DO    metFORMIN 1,500 mg Oral Daily With Breakfast Tiffany Fly, DO    methylPREDNISolone sodium succinate 40 mg Intravenous Q8H [facility] Tiffany Fly, DO    montelukast 10 mg Oral HS Tiffany Fly, DO    nicotine 1 patch Transdermal Daily Tiffany Fly, DO    ondansetron 4 mg Intravenous Q6H PRN Tiffany Fly, DO    sodium chloride 125 mL/hr Intravenous Continuous Tiffany Fly, DO Last Rate: 125 mL/hr (03/16/19 0450)     Continuous Infusions:   sodium chloride 125 mL/hr Last Rate: 125 mL/hr (03/16/19 0450)     PRN Meds:   acetaminophen    albuterol    benzonatate    influenza vaccine    ondansetron     CCD diet  PF  Pre and post  Treatment  [facility]  Pulmonary      Network Utilization Review Department  Phone: [phone]; Fax [phone]  [email or web address]  ATTENTION: Please call with any questions or concerns to [phone]  and carefully listen to the prompts so that you are directed to the right person  Send all requests for admission clinical reviews, approved or denied determinations and any other requests to fax [phone]   All voicemails are confidential

## 2019-03-16 NOTE — PLAN OF CARE
Problem: RESPIRATORY - ADULT  Goal: Achieves optimal ventilation and oxygenation  Description  INTERVENTIONS:  - Assess for changes in respiratory status  - Assess for changes in mentation and behavior  - Position to facilitate oxygenation and minimize respiratory effort  - Oxygen administration by appropriate delivery method based on oxygen saturation (per order) or ABGs  - Initiate smoking cessation education as indicated  - Encourage broncho-pulmonary hygiene including cough, deep breathe, Incentive Spirometry  - Assess the need for suctioning and aspirate as needed  - Assess and instruct to report SOB or any respiratory difficulty  - Respiratory Therapy support as indicated  Outcome: Progressing     Problem: METABOLIC, FLUID AND ELECTROLYTES - ADULT  Goal: Electrolytes maintained within normal limits  Description  INTERVENTIONS:  - Monitor labs and assess patient for signs and symptoms of electrolyte imbalances  - Administer electrolyte replacement as ordered  - Monitor response to electrolyte replacements, including repeat lab results as appropriate  - Instruct patient on fluid and nutrition as appropriate  Outcome: Progressing  Goal: Fluid balance maintained  Description  INTERVENTIONS:  - Monitor labs and assess for signs and symptoms of volume excess or deficit  - Monitor I/O and WT  - Instruct patient on fluid and nutrition as appropriate  Outcome: Progressing  Goal: Glucose maintained within target range  Description  INTERVENTIONS:  - Monitor Blood Glucose as ordered  - Assess for signs and symptoms of hyperglycemia and hypoglycemia  - Administer ordered medications to maintain glucose within target range  - Assess nutritional intake and initiate nutrition service referral as needed  Outcome: Progressing

## 2019-03-16 NOTE — PROGRESS NOTES
Progress Note - Burgess Obando 66 y o  female MRN: [id number]    Unit/Bed#: [address] [place] [address] [id number] Encounter: [id number]      Assessment:  Principal Problem: Moderate persistent asthma with acute exacerbation  Active Problems:    Controlled type 2 diabetes mellitus without complication, without long-term current use of insulin (HCC)    Essential hypertriglyceridemia    Other specified hypothyroidism  Resolved Problems:    * No resolved hospital problems  *        Plan:  · Asthma with acute exacerbation-had poor sleep last night and still having low peak flows at 170 this morning-will continue on IV steroids and breathing treatments  Do not feel she is stable yet for discharge  White cell count elevated at 13,670 -flu swab is pending  · Insomnia-poor sleep will add p r n  Ambien  · Diabetes mellitus type 2-fasting blood sugar 96  Subjective:   Patient had poor sleep last night only to hours at a time  No lightheadedness or dizziness  Having difficulty raising secretions  ROS  Comprehensive system review negative other than noted above    Objective:     Vitals: Blood pressure 136/70, pulse 73, temperature 97 6 °F (36 4 °C), temperature source Oral, resp  rate 18, height 5' 3" (1 6 m), weight 85 kg (187 lb 6 3 oz), SpO2 98 %, not currently breastfeeding  ,Body mass index is 33 19 kg/m²    Current Facility-Administered Medications   Medication Dose Route Frequency Provider Last Rate Last Dose    acetaminophen (TYLENOL) tablet 650 mg  650 mg Oral Q6H PRN Tiffany Fly, DO        albuterol (PROVENTIL HFA,VENTOLIN HFA) inhaler 2 puff  2 puff Inhalation Q4H PRN Tiffany Fly, DO        albuterol inhalation solution 2 5 mg  2 5 mg Nebulization TID Tiffany Fly, DO   2 5 mg at 03/16/19 0752    benzonatate (TESSALON PERLES) capsule 100 mg  100 mg Oral TID PRN [facility], DO        cholecalciferol (VITAMIN D3) tablet 1,000 Units  1,000 Units Oral Daily Tiffany Fly, DO   1,000 Units at 03/16/19 0943    docusate sodium (COLACE) capsule 100 mg  100 mg Oral BID Sung Sickle, DO        famotidine (PEPCID) tablet 20 mg  20 mg Oral BID REYMUNDO Rodrigez   20 mg at 03/16/19 1140    fenofibrate (TRICOR) tablet 48 mg  48 mg Oral Daily Tiffany Fly, DO   48 mg at 03/16/19 0945    fluticasone-vilanterol (BREO ELLIPTA) 200-25 MCG/INH inhaler 1 puff  1 puff Inhalation Daily Tiffany Fly, DO   1 puff at 03/16/19 0752    guaiFENesin (MUCINEX) 12 hr tablet 600 mg  600 mg Oral Q12H Tiffany Fly, DO   600 mg at 03/16/19 0943    insulin lispro (HumaLOG) 100 units/mL subcutaneous injection 1-5 Units  1-5 Units Subcutaneous HS Tiffany Fly, DO        ipratropium (ATROVENT) 0 02 % inhalation solution 0 5 mg  0 5 mg Nebulization TID REYMUNDO Rodrigez        levalbuterol [facility]) inhalation solution 1 25 mg  1 25 mg Nebulization TID REYMUNDO Rodrigez        levothyroxine tablet 200 mcg  200 mcg Oral Daily Tiffany Fly, DO   200 mcg at 03/16/19 0609    lisinopril (ZESTRIL) tablet 20 mg  20 mg Oral Daily Tifafny Fly, DO   20 mg at 03/16/19 0943    metFORMIN (GLUCOPHAGE-XR) 24 hr tablet 1,500 mg  1,500 mg Oral Daily With Breakfast Tiffany Fly, DO   1,500 mg at 03/16/19 0913    methylPREDNISolone sodium succinate (Solu-MEDROL) injection 40 mg  40 mg Intravenous Q12H [facility] REYMUNDO Rodrigez        montelukast (SINGULAIR) tablet 10 mg  10 mg Oral HS Tiffany Fly, DO   10 mg at 03/15/19 2128    nicotine (NICODERM CQ) 7 mg/24hr TD 24 hr patch 1 patch  1 patch Transdermal Daily Tiffany Fly, DO        ondansetron (ZOFRAN) injection 4 mg  4 mg Intravenous Q6H PRN Tiffany Fly, DO        [START ON 3/17/2019] predniSONE tablet 40 mg  40 mg Oral Daily With Breakfast REYMUNDO Rodrigez        sodium chloride 0 9 % infusion  125 mL/hr Intravenous Continuous Tiffany Fly,  mL/hr at 03/16/19 1143 125 mL/hr at 03/16/19 1143     Medications Prior to Admission   Medication    albuterol (PROVENTIL HFA,VENTOLIN HFA) 90 mcg/act inhaler    cholecalciferol (VITAMIN D3) 1,000 units tablet    fenofibrate micronized (LOFIBRA) 200 MG capsule    fluticasone-salmeterol (AIRDUO RESPICLICK) 69-95 mcg/act dry powder inhaler    levothyroxine 200 mcg tablet    lisinopril (ZESTRIL) 20 mg tablet    metFORMIN (GLUCOPHAGE-XR) 500 mg 24 hr tablet    predniSONE 10 mg tablet    TALTZ 80 MG/ML SOAJ    predniSONE 10 mg tablet    varenicline (CHANTIX STARTING MONTH PAK) 0 5 MG X 11 & 1 MG X 42 tablet         Intake/Output Summary (Last 24 hours) at 3/16/2019 1242  Last data filed at 3/16/2019 0601  Gross per 24 hour   Intake 2593 75 ml   Output 300 ml   Net 2293 75 ml       Physical Exam:  General appearance: alert  Neck: no adenopathy, no JVD, supple, symmetrical, trachea midline and thyroid not enlarged, symmetric, no tenderness/mass/nodules  Lungs: rhonchi bilaterally in the upper airways  Heart: regular rate and rhythm, S1, S2 normal, no murmur, click, rub or gallop  Abdomen: soft, non-tender; bowel sounds normal; no masses,  no organomegaly  Extremities: extremities normal, warm and well-perfused; no cyanosis, clubbing, or edema  Skin: Skin color, texture, turgor normal  No rashes or lesions  Neurologic:  Normal       Lab, Imaging and other studies: I have personally reviewed pertinent reports            Results from last 7 days   Lab Units 03/16/19  0535 03/15/19  1528   WBC Thousand/uL 13 67* 14 36*   HEMOGLOBIN g/dL 12 8 14 4   HEMATOCRIT % 38 0 41 8   PLATELETS Thousands/uL 291 330   NEUTROS PCT %  --  65   LYMPHS PCT %  --  23   MONOS PCT %  --  9   EOS PCT %  --  0     Results from last 7 days   Lab Units 03/16/19  0535 03/15/19  1528   POTASSIUM mmol/L 4 0 4 1   CHLORIDE mmol/L 104 105   CO2 mmol/L 22 27   BUN mg/dL 20 24   CREATININE mg/dL 0 63 0 96   CALCIUM mg/dL 7 9* 9 2   ALK PHOS U/L  --  72   ALT U/L  --  31   AST U/L  --  18     Lab Results   Component Value Date    CKTOTAL 54 02/04/2016         No results found for: Tamanna Deep, SPUTUMCULTUR    Imaging:  No results found for this or any previous visit  Results for orders placed during the hospital encounter of 03/15/19   XR chest 2 views    Narrative CHEST     INDICATION:   Shortness of breath  COMPARISON:  March 8, 2019  EXAM PERFORMED/VIEWS:  XR CHEST PA & LATERAL      FINDINGS:    Cardiomediastinal silhouette appears unremarkable  Linear markings at the lung bases suggest bibasilar atelectasis  Lungs are otherwise clear  No pneumothorax  No pleural effusion  Osseous structures appear within normal limits for patient age  Impression No focal consolidation, pleural effusion, or pneumothorax  Workstation performed: [id number]         PATIENT CENTERED ROUNDS: I have performed rounds with the nursing staff            Joaquin Contreras DO

## 2019-03-17 PROBLEM — I10 ESSENTIAL HYPERTENSION: Status: ACTIVE | Noted: 2019-03-17

## 2019-03-17 LAB
GLUCOSE SERPL-MCNC: 103 MG/DL (ref 65–140)
GLUCOSE SERPL-MCNC: 119 MG/DL (ref 65–140)
GLUCOSE SERPL-MCNC: 145 MG/DL (ref 65–140)
GLUCOSE SERPL-MCNC: 93 MG/DL (ref 65–140)

## 2019-03-17 PROCEDURE — 99232 SBSQ HOSP IP/OBS MODERATE 35: CPT | Performed by: INTERNAL MEDICINE

## 2019-03-17 PROCEDURE — 94640 AIRWAY INHALATION TREATMENT: CPT

## 2019-03-17 PROCEDURE — 94760 N-INVAS EAR/PLS OXIMETRY 1: CPT

## 2019-03-17 PROCEDURE — 82948 REAGENT STRIP/BLOOD GLUCOSE: CPT

## 2019-03-17 RX ADMIN — IPRATROPIUM BROMIDE 0.5 MG: 0.5 SOLUTION RESPIRATORY (INHALATION) at 08:44

## 2019-03-17 RX ADMIN — PREDNISONE 40 MG: 20 TABLET ORAL at 08:26

## 2019-03-17 RX ADMIN — MONTELUKAST SODIUM 10 MG: 10 TABLET, FILM COATED ORAL at 21:05

## 2019-03-17 RX ADMIN — FAMOTIDINE 20 MG: 20 TABLET ORAL at 17:30

## 2019-03-17 RX ADMIN — LEVOTHYROXINE SODIUM 200 MCG: 100 TABLET ORAL at 06:10

## 2019-03-17 RX ADMIN — FAMOTIDINE 20 MG: 20 TABLET ORAL at 08:26

## 2019-03-17 RX ADMIN — CHOLECALCIFEROL TAB 25 MCG (1000 UNIT) 1000 UNITS: 25 TAB at 08:26

## 2019-03-17 RX ADMIN — IPRATROPIUM BROMIDE 0.5 MG: 0.5 SOLUTION RESPIRATORY (INHALATION) at 13:35

## 2019-03-17 RX ADMIN — METFORMIN HYDROCHLORIDE 1500 MG: 750 TABLET, EXTENDED RELEASE ORAL at 08:26

## 2019-03-17 RX ADMIN — LEVALBUTEROL 1.25 MG: 1.25 SOLUTION, CONCENTRATE RESPIRATORY (INHALATION) at 13:35

## 2019-03-17 RX ADMIN — FLUTICASONE FUROATE AND VILANTEROL TRIFENATATE 1 PUFF: 200; 25 POWDER RESPIRATORY (INHALATION) at 08:43

## 2019-03-17 RX ADMIN — LEVALBUTEROL 1.25 MG: 1.25 SOLUTION, CONCENTRATE RESPIRATORY (INHALATION) at 21:30

## 2019-03-17 RX ADMIN — IPRATROPIUM BROMIDE 0.5 MG: 0.5 SOLUTION RESPIRATORY (INHALATION) at 21:31

## 2019-03-17 RX ADMIN — GUAIFENESIN 600 MG: 600 TABLET, EXTENDED RELEASE ORAL at 21:05

## 2019-03-17 RX ADMIN — LEVALBUTEROL 1.25 MG: 1.25 SOLUTION, CONCENTRATE RESPIRATORY (INHALATION) at 08:44

## 2019-03-17 RX ADMIN — ALBUTEROL SULFATE 2.5 MG: 2.5 SOLUTION RESPIRATORY (INHALATION) at 08:37

## 2019-03-17 RX ADMIN — LISINOPRIL 20 MG: 20 TABLET ORAL at 08:26

## 2019-03-17 RX ADMIN — FENOFIBRATE 48 MG: 48 TABLET ORAL at 11:32

## 2019-03-17 RX ADMIN — GUAIFENESIN 600 MG: 600 TABLET, EXTENDED RELEASE ORAL at 08:26

## 2019-03-17 NOTE — PROGRESS NOTES
Progress Note - Pulmonary   Ludin Oquendo 66 y o  female MRN: [id number]  Unit/Bed#: [address] [place] [address] [id number] Encounter: [id number]      Assessment:  1  Acute exacerbation of moderate persistent asthma  Plan:  · Agree with conversion to prednisone 40 mg daily  Would do 40 mg x3 days, 30 mg x3 days, 20 mg x3 days, 10 mg x3 days, then off  · Pan Langley is appropriate at discharge, however would convert to the 232 mcg dose to get her on at least a medium dose corticosteroid  · Patient has been abstinent from cigarettes for 11 days, and should continue Chantix at discharge  · From my perspective, if she does not have exertional hypoxia she is safe to discharge today  She would like to establish care with us after discharge  Subjective:   No acute complaints  She feels her breathing is better than yesterday  Vitals: Blood pressure 143/73, pulse 77, temperature 98 3 °F (36 8 °C), temperature source Oral, resp  rate 18, height 5' 3" (1 6 m), weight 85 kg (187 lb 6 3 oz), SpO2 97 %, not currently breastfeeding , Body mass index is 33 19 kg/m²  Intake/Output Summary (Last 24 hours) at 3/17/2019 0938  Last data filed at 3/17/2019 0600  Gross per 24 hour   Intake 200 ml   Output    Net 200 ml       Physical Exam  Gen: Awake, alert, oriented x 3, no acute distress  HEENT: Mucous membranes moist, no oral lesions, no thrush  NECK: No accessory muscle use, JVP not elevated  Cardiac: Regular, single S1, single S2, no murmurs, no rubs, no gallops  Lungs:  Diffuse end-expiratory wheezing with better air entry in the apices and bases  Wheezing less prominent at bases  Wheezing is improved over yesterday  Abdomen: normoactive bowel sounds, soft nontender, nondistended, no rebound or rigidity, no guarding  Extremities: no cyanosis, no clubbing, no edema    Labs: I have personally reviewed pertinent lab results          Marlene Salazar MD

## 2019-03-17 NOTE — ASSESSMENT & PLAN NOTE
Lab Results   Component Value Date    HGBA1C 5 8 (H) 11/06/2018       Recent Labs     03/16/19  1616 03/16/19  2143 03/17/19  0713 03/17/19  1107   POCGLU 73 104 93 103       Blood Sugar Average: Last 72 hrs:  (P) 114 [id number]   Patient will continue on sliding scale and metformin

## 2019-03-17 NOTE — PROGRESS NOTES
Progress Note - Jackelyn Monterroso 1960, 66 y o  female MRN: [id number]  Unit/Bed#: [address] [place] [address] Encounter: [id number]  Primary Care Provider: Don Mosquera DO   Date and time admitted to hospital: 3/15/2019  3:01 PM        * Moderate persistent asthma with acute exacerbation  Assessment & Plan  Patient continues to improve  Continue with IV steroids for another day/scheduled nebs/inhalers  Strongly urged for smoking cessation  She will need a pulse ox at least 24 hours discharge  Controlled type 2 diabetes mellitus without complication, without long-term current use of insulin [facility])  Assessment & Plan  Lab Results   Component Value Date    HGBA1C 5 8 (H) 11/06/2018       Recent Labs     03/16/19  1616 03/16/19  2143 03/17/19  0713 03/17/19  1107   POCGLU 73 104 93 103       Blood Sugar Average: Last 72 hrs:  (P) 114 [id number]   Patient will continue on sliding scale and metformin  Essential hypertriglyceridemia  Assessment & Plan  Continue fenofibrate  Fasting lipid profile in a m  Other specified hypothyroidism  Assessment & Plan  Continue with levothyroxine  TSH = 1 3    Essential hypertension  Assessment & Plan  Blood pressure is reasonable  Continue with lisinopril  VTE PROPHYLAXIS: SCDs  EDUCATION AND DISCUSSIONS WITH PATIENT/FAMILY:  Patient, mother and patient's sister, have all been updated  CURRENT LENGTH OF STAY:2     CURRENT PATIENT STATUS: Inpatient     CERTIFICATION STATEMENT: The patient will continue to require additional inpatient hospital stay due to ongoing goals of care discussion as mentioned below  PATIENT CENTERED ROUNDS: I have performed bedside rounds with nursing staff today  ESTIMATED DISCHARGE DATE:  Anticipate discharge in next 24 hours  CODE STATUS: Level 1 - Full Code      ______________________________________________________________________    SUBJECTIVE:   Patient seen and examined  She is comfortable breathing on room air  Occasional cough  Patient will receive IV Solu-Medrol today and transition to po prednisone in a m  Pulse oximetry has been ordered for tomorrow  OBJECTIVE:     Vitals:   Temp:  [97 5 °F (36 4 °C)-98 3 °F (36 8 °C)] 98 3 °F (36 8 °C)  HR:  [68-79] 77  Resp:  [18] 18  BP: (123-160)/(62-91) 143/73  SpO2:  [96 %-98 %] 98 %  Temp (24hrs), Av °F (36 7 °C), Min:97 5 °F (36 4 °C), Max:98 3 °F (36 8 °C)  Current: Temperature: 98 3 °F (36 8 °C)    Intake/Output Summary (Last 24 hours) at 3/17/2019 1448  Last data filed at 3/17/2019 0600  Gross per 24 hour   Intake 200 ml   Output    Net 200 ml       Physical Exam:   GENERAL: AAO x 3  HEENT: atraumatic, normocephalic  Oral mucosa moist, no icterus, pallor  PERRLA +  Neck supple, no JVD, no lymphadenopathy, no thryomegaly  CHEST: B/L breath sounds heard, occasional wheezing  CVS: S1, S2   ABDOMEN: Soft/obese/NT/BS heard  NEUROLOGICAL: CN II -XI grossly intact  No focal motor or sensory deficits  No signs of meningeal irritation or cerebellar dysfunction  EXTREMITIES: No cyanosis/clubbing or edema  LABS:  CBCD, BMP reviewed from 2 days ago      Scheduled Meds:    Current Facility-Administered Medications:  acetaminophen 650 mg Oral Q6H PRN Tiffany Fly, DO   albuterol 2 puff Inhalation Q4H PRN Tiffany Fly, DO   albuterol 2 5 mg Nebulization TID Tiffany Fly, DO   benzonatate 100 mg Oral TID PRN Vida Mano, DO   cholecalciferol 1,000 Units Oral Daily Tiffany Fly, DO   docusate sodium 100 mg Oral BID Tiffany Fly, DO   famotidine 20 mg Oral BID Uriel REYMUNDO Allen   fenofibrate 48 mg Oral Daily Tiffany Fly, DO   fluticasone-vilanterol 1 puff Inhalation Daily Tiffany Fly, DO   guaiFENesin 600 mg Oral Q12H Tiffany Fly, DO   insulin lispro 1-5 Units Subcutaneous HS Tiffany Fly, DO   ipratropium 0 5 mg Nebulization TID Uriel Allen, CRNP   levalbuterol 1 25 mg Nebulization TID REYMUNDO Ramachandran   levothyroxine 200 mcg Oral Daily Tiffany Fly, DO   lisinopril 20 mg Oral Daily Tiffany David, DO metFORMIN 1,500 mg Oral Daily With Breakfast Tiffany Fly, DO   montelukast 10 mg Oral HS Tiffany Fly, DO   nicotine 1 patch Transdermal Daily Tiffany Fly, DO   ondansetron 4 mg Intravenous Q6H PRN Tiffany Fly, DO   predniSONE 40 mg Oral Daily With Breakfast Rommel Bible, CRNP   zolpidem 5 mg Oral HS PRN Lynn Liz, DO       PRN Meds:    acetaminophen    albuterol    benzonatate    ondansetron    zolpidem    Time Spent for Care: 20 minutes  More than 50% of total time spent on counseling and coordination of care as described above  Ivan Hernandez MD  HOSPITALIST SERVICES  3/17/2019    PLEASE NOTE:  This encounter was completed utilizing the [employer] Direct Speech Voice Recognition Software  Grammatical errors, random word insertions, pronoun errors and incomplete sentences are occasional consequences of the system due to software limitations, ambient noise and hardware issues  These may be missed by proof reading prior to affixing electronic signature  Any questions or concerns about the content, text or information contained within the body of this dictation should be directly addressed to the physician for clarification  Please do not hesitate to call me directly if you have any any questions or concerns

## 2019-03-17 NOTE — ASSESSMENT & PLAN NOTE
Patient continues to improve  Continue with IV steroids for another day/scheduled nebs/inhalers  Strongly urged for smoking cessation  She will need a pulse ox at least 24 hours discharge

## 2019-03-17 NOTE — PLAN OF CARE
Problem: RESPIRATORY - ADULT  Goal: Achieves optimal ventilation and oxygenation  Description  INTERVENTIONS:  - Assess for changes in respiratory status  - Assess for changes in mentation and behavior  - Position to facilitate oxygenation and minimize respiratory effort  - Oxygen administration by appropriate delivery method based on oxygen saturation (per order) or ABGs  - Initiate smoking cessation education as indicated  - Encourage broncho-pulmonary hygiene including cough, deep breathe, Incentive Spirometry  - Assess the need for suctioning and aspirate as needed  - Assess and instruct to report SOB or any respiratory difficulty  - Respiratory Therapy support as indicated  Outcome: Progressing     Problem: METABOLIC, FLUID AND ELECTROLYTES - ADULT  Goal: Electrolytes maintained within normal limits  Description  INTERVENTIONS:  - Monitor labs and assess patient for signs and symptoms of electrolyte imbalances  - Administer electrolyte replacement as ordered  - Monitor response to electrolyte replacements, including repeat lab results as appropriate  - Instruct patient on fluid and nutrition as appropriate  Outcome: Progressing  Goal: Fluid balance maintained  Description  INTERVENTIONS:  - Monitor labs and assess for signs and symptoms of volume excess or deficit  - Monitor I/O and WT  - Instruct patient on fluid and nutrition as appropriate  Outcome: Progressing  Goal: Glucose maintained within target range  Description  INTERVENTIONS:  - Monitor Blood Glucose as ordered  - Assess for signs and symptoms of hyperglycemia and hypoglycemia  - Administer ordered medications to maintain glucose within target range  - Assess nutritional intake and initiate nutrition service referral as needed  Outcome: Progressing     Problem: DISCHARGE PLANNING - CARE MANAGEMENT  Goal: Discharge to post-acute care or home with appropriate resources  Description  INTERVENTIONS:  - Conduct assessment to determine patient/family and health care team treatment goals, and need for post-acute services based on payer coverage, community resources, and patient preferences, and barriers to discharge  - Address psychosocial, clinical, and financial barriers to discharge as identified in assessment in conjunction with the patient/family and health care team  - Arrange appropriate level of post-acute services according to patient?s   needs and preference and payer coverage in collaboration with the physician and health care team  - Communicate with and update the patient/family, physician, and health care team regarding progress on the discharge plan  - Arrange appropriate transportation to post-acute venues  Outcome: Progressing

## 2019-03-18 VITALS
HEIGHT: 63 IN | WEIGHT: 187.39 LBS | TEMPERATURE: 97.5 F | OXYGEN SATURATION: 99 % | SYSTOLIC BLOOD PRESSURE: 130 MMHG | DIASTOLIC BLOOD PRESSURE: 72 MMHG | BODY MASS INDEX: 33.2 KG/M2 | RESPIRATION RATE: 18 BRPM | HEART RATE: 70 BPM

## 2019-03-18 LAB
BASOPHILS # BLD MANUAL: 0 THOUSAND/UL (ref 0–0.1)
BASOPHILS NFR MAR MANUAL: 0 % (ref 0–1)
EOSINOPHIL # BLD MANUAL: 0 THOUSAND/UL (ref 0–0.4)
EOSINOPHIL NFR BLD MANUAL: 0 % (ref 0–6)
ERYTHROCYTE [DISTWIDTH] IN BLOOD BY AUTOMATED COUNT: 12.6 % (ref 11.6–15.1)
GLUCOSE SERPL-MCNC: 101 MG/DL (ref 65–140)
GLUCOSE SERPL-MCNC: 75 MG/DL (ref 65–140)
HCT VFR BLD AUTO: 41.3 % (ref 34.8–46.1)
HGB BLD-MCNC: 13.7 G/DL (ref 11.5–15.4)
LYMPHOCYTES # BLD AUTO: 29 % (ref 14–44)
LYMPHOCYTES # BLD AUTO: 4.06 THOUSAND/UL (ref 0.6–4.47)
MCH RBC QN AUTO: 30.6 PG (ref 26.8–34.3)
MCHC RBC AUTO-ENTMCNC: 33.2 G/DL (ref 31.4–37.4)
MCV RBC AUTO: 92 FL (ref 82–98)
MONOCYTES # BLD AUTO: 0.84 THOUSAND/UL (ref 0–1.22)
MONOCYTES NFR BLD: 6 % (ref 4–12)
NEUTROPHILS # BLD MANUAL: 8.81 THOUSAND/UL (ref 1.85–7.62)
NEUTS SEG NFR BLD AUTO: 63 % (ref 43–75)
NRBC BLD AUTO-RTO: 0 /100 WBCS
PLATELET # BLD AUTO: 314 THOUSANDS/UL (ref 149–390)
PLATELET BLD QL SMEAR: ADEQUATE
PMV BLD AUTO: 10 FL (ref 8.9–12.7)
RBC # BLD AUTO: 4.47 MILLION/UL (ref 3.81–5.12)
RBC MORPH BLD: NORMAL
TOTAL CELLS COUNTED SPEC: 100
VARIANT LYMPHS # BLD AUTO: 2 %
WBC # BLD AUTO: 13.99 THOUSAND/UL (ref 4.31–10.16)

## 2019-03-18 PROCEDURE — 82948 REAGENT STRIP/BLOOD GLUCOSE: CPT

## 2019-03-18 PROCEDURE — 99239 HOSP IP/OBS DSCHRG MGMT >30: CPT | Performed by: INTERNAL MEDICINE

## 2019-03-18 PROCEDURE — 85007 BL SMEAR W/DIFF WBC COUNT: CPT | Performed by: INTERNAL MEDICINE

## 2019-03-18 PROCEDURE — 94760 N-INVAS EAR/PLS OXIMETRY 1: CPT

## 2019-03-18 PROCEDURE — 94640 AIRWAY INHALATION TREATMENT: CPT

## 2019-03-18 PROCEDURE — 85027 COMPLETE CBC AUTOMATED: CPT | Performed by: INTERNAL MEDICINE

## 2019-03-18 RX ORDER — PREDNISONE 10 MG/1
TABLET ORAL
Qty: 30 TABLET | Refills: 0 | Status: SHIPPED | OUTPATIENT
Start: 2019-03-18 | End: 2019-05-06

## 2019-03-18 RX ADMIN — LEVOTHYROXINE SODIUM 200 MCG: 100 TABLET ORAL at 06:05

## 2019-03-18 RX ADMIN — METFORMIN HYDROCHLORIDE 1500 MG: 750 TABLET, EXTENDED RELEASE ORAL at 09:06

## 2019-03-18 RX ADMIN — PREDNISONE 40 MG: 20 TABLET ORAL at 09:04

## 2019-03-18 RX ADMIN — FENOFIBRATE 48 MG: 48 TABLET ORAL at 10:07

## 2019-03-18 RX ADMIN — CHOLECALCIFEROL TAB 25 MCG (1000 UNIT) 1000 UNITS: 25 TAB at 09:04

## 2019-03-18 RX ADMIN — GUAIFENESIN 600 MG: 600 TABLET, EXTENDED RELEASE ORAL at 09:04

## 2019-03-18 RX ADMIN — LISINOPRIL 20 MG: 20 TABLET ORAL at 09:04

## 2019-03-18 RX ADMIN — FLUTICASONE FUROATE AND VILANTEROL TRIFENATATE 1 PUFF: 200; 25 POWDER RESPIRATORY (INHALATION) at 10:13

## 2019-03-18 RX ADMIN — IPRATROPIUM BROMIDE 0.5 MG: 0.5 SOLUTION RESPIRATORY (INHALATION) at 10:13

## 2019-03-18 RX ADMIN — LEVALBUTEROL 1.25 MG: 1.25 SOLUTION, CONCENTRATE RESPIRATORY (INHALATION) at 10:13

## 2019-03-18 RX ADMIN — FAMOTIDINE 20 MG: 20 TABLET ORAL at 09:04

## 2019-03-18 NOTE — PLAN OF CARE
Problem: DISCHARGE PLANNING - CARE MANAGEMENT  Goal: Discharge to post-acute care or home with appropriate resources  Description  INTERVENTIONS:  - Conduct assessment to determine patient/family and health care team treatment goals, and need for post-acute services based on payer coverage, community resources, and patient preferences, and barriers to discharge  - Address psychosocial, clinical, and financial barriers to discharge as identified in assessment in conjunction with the patient/family and health care team  - Arrange appropriate level of post-acute services according to patient?s   needs and preference and payer coverage in collaboration with the physician and health care team  - Communicate with and update the patient/family, physician, and health care team regarding progress on the discharge plan  - Arrange appropriate transportation to post-acute venues  3/18/2019 1423 by Senthil Seo RN  Outcome: Adequate for Discharge  3/18/2019 1105 by Senthil Seo RN  Outcome: Progressing     Problem: RESPIRATORY - ADULT  Goal: Achieves optimal ventilation and oxygenation  Description  INTERVENTIONS:  - Assess for changes in respiratory status  - Assess for changes in mentation and behavior  - Position to facilitate oxygenation and minimize respiratory effort  - Oxygen administration by appropriate delivery method based on oxygen saturation (per order) or ABGs  - Initiate smoking cessation education as indicated  - Encourage broncho-pulmonary hygiene including cough, deep breathe, Incentive Spirometry  - Assess the need for suctioning and aspirate as needed  - Assess and instruct to report SOB or any respiratory difficulty  - Respiratory Therapy support as indicated  3/18/2019 1423 by Senthil Seo RN  Outcome: Adequate for Discharge  3/18/2019 1105 by Senthil Seo RN  Outcome: Progressing     Problem: METABOLIC, FLUID AND ELECTROLYTES - ADULT  Goal: Electrolytes maintained within normal limits  Description  INTERVENTIONS:  - Monitor labs and assess patient for signs and symptoms of electrolyte imbalances  - Administer electrolyte replacement as ordered  - Monitor response to electrolyte replacements, including repeat lab results as appropriate  - Instruct patient on fluid and nutrition as appropriate  3/18/2019 1423 by Adithya Soni RN  Outcome: Adequate for Discharge  3/18/2019 1105 by Adithya Soni RN  Outcome: Progressing  Goal: Fluid balance maintained  Description  INTERVENTIONS:  - Monitor labs and assess for signs and symptoms of volume excess or deficit  - Monitor I/O and WT  - Instruct patient on fluid and nutrition as appropriate  3/18/2019 1423 by Adithya Soni RN  Outcome: Adequate for Discharge  3/18/2019 1105 by Adithya Soni RN  Outcome: Progressing  Goal: Glucose maintained within target range  Description  INTERVENTIONS:  - Monitor Blood Glucose as ordered  - Assess for signs and symptoms of hyperglycemia and hypoglycemia  - Administer ordered medications to maintain glucose within target range  - Assess nutritional intake and initiate nutrition service referral as needed  3/18/2019 1423 by Adithya Soni RN  Outcome: Adequate for Discharge  3/18/2019 1105 by Adithya Soni RN  Outcome: Progressing

## 2019-03-18 NOTE — PLAN OF CARE
Problem: DISCHARGE PLANNING - CARE MANAGEMENT  Goal: Discharge to post-acute care or home with appropriate resources  Description  INTERVENTIONS:  - Conduct assessment to determine patient/family and health care team treatment goals, and need for post-acute services based on payer coverage, community resources, and patient preferences, and barriers to discharge  - Address psychosocial, clinical, and financial barriers to discharge as identified in assessment in conjunction with the patient/family and health care team  - Arrange appropriate level of post-acute services according to patient's   needs and preference and payer coverage in collaboration with the physician and health care team  - Communicate with and update the patient/family, physician, and health care team regarding progress on the discharge plan  - Arrange appropriate transportation to post-acute venues   Outcome: Completed

## 2019-03-18 NOTE — DISCHARGE SUMMARY
Discharge Summary - Berny Faustin 66 y o  female MRN: [id number]  Unit/Bed#: [address] [place], [place] [address] Encounter: [id number]    Admission Date:    3/15/2019   Discharge Date:   03/18/19   Admitting Diagnosis:   Cough [R05]  Asthma exacerbation [J45 901]  Admitting Provider:   Nichelle Carpenter DO  Discharge Provider:   Griselda Mclain MD     Primary Care Physician at Discharge:   NAOMI Gil[place],[phone]    HPI:   70-year-old female who presented to emergency department with complain of severe shortness of breath  For a detailed HPI please refer to the admission note  Procedures Performed:   Orders Placed This Encounter   Procedures   [address] ED ECG Documentation Only       Hospital Course:   Patient was admitted with acute exacerbation of asthma  Patient was started on IV steroids  Patient history in of for tobacco abuse and was started on nicotine patch  Patient was seen by pulmonologist who agreed on continuing them IV steroids and daily peak flow  Patient is feeling better and pulmonologist recommended converting patient to prednisone 40 mg daily and taper by 10 mg every 3 days  The also recommended to discharge patient on [place]  Patient was weaned off oxygen  Patient had pulse ox with ambulation done and did not require home O2  Patient was ambulating freely in the hallways  Smoking cessation counseling given at length  Patient was seen by  and refused any home care services  Follow-up with PCP in 3-4 days  Patient will see PCP to obtain clearance to return to work  Follow-up with pulmonologist as outpatient  Outpatient PFTs  Return to ER with any worsening shortness of breath, chest pain or any other alarming symptoms      Consulting Providers   Pulmonary    Complications:  None    Labs:   Lab Results   Component Value Date    WBC 13 99 (H) 03/18/2019    RBC 4 47 03/18/2019    HGB 13 7 03/18/2019    HCT 41 3 03/18/2019    MCV 92 03/18/2019    MCH 30 6 03/18/2019    RDW 12 6 03/18/2019     03/18/2019     Lab Results   Component Value Date    CREATININE 0 63 03/16/2019    BUN 20 03/16/2019    BUN 13 11/06/2018    K 4 0 03/16/2019    K 4 4 11/06/2018     03/16/2019     11/06/2018    CO2 22 03/16/2019    CO2 21 11/06/2018    ALKPHOS 72 03/15/2019    ALT 31 03/15/2019    ALT 25 11/06/2018    AST 18 03/15/2019    AST 24 11/06/2018       Treatments:  Solu-Medrol, fenofibrate, Pepcid, Colace, Xopenex, Synthroid, Zestril, nicotine patch, metformin    Discharge Diagnosis:   Principal Problem: Moderate persistent asthma with acute exacerbation  Active Problems:    Controlled type 2 diabetes mellitus without complication, without long-term current use of insulin (HCC)    Essential hypertriglyceridemia    Other specified hypothyroidism    Essential hypertension  Resolved Problems:    * No resolved hospital problems  *      Condition at Discharge:   Good     Code Status: Level 1 - Full Code  Advance Directive and Living Will: <no information>  Power of :    POLST:      Discharge instructions/Information to patient and family:   See after visit summary for information provided to patient and family  Provisions for Follow-Up Care:  See after visit summary for information related to follow-up care and any pertinent home health orders  Disposition:   Home    Planned Readmission:   No    Discharge Statement   I spent 35 minutes discharging the patient  This time was spent on the day of discharge  I had direct contact with the patient on the day of discharge  Greater than 50% of the total time was spent examining patient, answering all patient questions, arranging and discussing plan of care with patient as well as directly providing post-discharge instructions  Additional time then spent on discharge activities  Discharge Medications:  See after visit summary for reconciled discharge medications provided to patient and family        "This note has been constructed using a voice recognition system"    Louann Scheuermann, MD  3/18/2019,12:17 PM

## 2019-03-18 NOTE — PLAN OF CARE
Problem: DISCHARGE PLANNING - CARE MANAGEMENT  Goal: Discharge to post-acute care or home with appropriate resources  Description  INTERVENTIONS:  - Conduct assessment to determine patient/family and health care team treatment goals, and need for post-acute services based on payer coverage, community resources, and patient preferences, and barriers to discharge  - Address psychosocial, clinical, and financial barriers to discharge as identified in assessment in conjunction with the patient/family and health care team  - Arrange appropriate level of post-acute services according to patient?s   needs and preference and payer coverage in collaboration with the physician and health care team  - Communicate with and update the patient/family, physician, and health care team regarding progress on the discharge plan  - Arrange appropriate transportation to post-acute venues  Outcome: Progressing     Problem: RESPIRATORY - ADULT  Goal: Achieves optimal ventilation and oxygenation  Description  INTERVENTIONS:  - Assess for changes in respiratory status  - Assess for changes in mentation and behavior  - Position to facilitate oxygenation and minimize respiratory effort  - Oxygen administration by appropriate delivery method based on oxygen saturation (per order) or ABGs  - Initiate smoking cessation education as indicated  - Encourage broncho-pulmonary hygiene including cough, deep breathe, Incentive Spirometry  - Assess the need for suctioning and aspirate as needed  - Assess and instruct to report SOB or any respiratory difficulty  - Respiratory Therapy support as indicated  Outcome: Progressing     Problem: METABOLIC, FLUID AND ELECTROLYTES - ADULT  Goal: Electrolytes maintained within normal limits  Description  INTERVENTIONS:  - Monitor labs and assess patient for signs and symptoms of electrolyte imbalances  - Administer electrolyte replacement as ordered  - Monitor response to electrolyte replacements, including repeat lab results as appropriate  - Instruct patient on fluid and nutrition as appropriate  Outcome: Progressing  Goal: Fluid balance maintained  Description  INTERVENTIONS:  - Monitor labs and assess for signs and symptoms of volume excess or deficit  - Monitor I/O and WT  - Instruct patient on fluid and nutrition as appropriate  Outcome: Progressing  Goal: Glucose maintained within target range  Description  INTERVENTIONS:  - Monitor Blood Glucose as ordered  - Assess for signs and symptoms of hyperglycemia and hypoglycemia  - Administer ordered medications to maintain glucose within target range  - Assess nutritional intake and initiate nutrition service referral as needed  Outcome: Progressing (2) potential problem

## 2019-03-18 NOTE — DISCHARGE INSTRUCTIONS
Follow-up with PCP in 3-4 days  Patient will see PCP to obtain clearance to return to work  Follow-up with pulmonologist as outpatient  Outpatient PFTs  Return to ER with any worsening shortness of breath, chest pain or any other alarming symptoms

## 2019-03-18 NOTE — RESPIRATORY THERAPY NOTE
Home Oxygen Qualifying Test       Patient name: Maddison Carlton        : 1960   Date of Test:  2019  Diagnosis:      Home Oxygen Test:    **Medicare Guidelines require item(s) 1-5 on all ambulatory patients or 1 and 2 on non-ambulatory patients  1   Baseline SPO2 on Room Air at rest 96 %  2   SPO2 during exercise on Room Air 98 %  During exercise monitor SpO2  If SPO2 increases >=89% with ambulation do not add supplemental             oxygen  If <= 88% on room air add O2 via NC and titrate patient  Patient must be ambulated with O2 and titrated to > 88% with exertion  3   SPO2 on Oxygen at rest  %     lpm     4   SPO2 during exercise on Oxygen     % a liter flow of     lpm     5   Exercise performed:          walking, duration 6 (min)       Walked more than 600 feet at an above average pace with patient pushing an empty wheelchair  SpO2 and HR continuously monitored  SpO2 actually increased up to 99% sat at times and HR hovered around 82 to 94 bpm          []  Supplemental Home Oxygen is indicated  [x]  Client does not qualify for home oxygen  Respiratory Additional Notes-   No wheezing heard in her lung fields prior to or after the walk      Cherie Richards, RT

## 2019-03-18 NOTE — SOCIAL WORK
At request of Dr Veronica Sahni, discussed role of Care Management  Patient resides in a 2 story home with her boyfriend, daughter, s-in-l and grandson  She is independent of ADL's and uses no assistive device  She is employed and hopes to home back to work soon  She anticipates no discharge needs

## 2019-03-18 NOTE — UTILIZATION REVIEW
Smith Bloch, RN   Registered Nurse   Utilization Review   Utilization Review   Signed   Date of Service:  3/16/2019  8:01 AM               Signed                 Show:Clear all  [x]Manual[x]Template[x]Copied    Added by:  [x]Ryann Romero RN      []Nick for details      Initial Clinical Review     Admission: Date/Time/Statement: 3/15/19 @ 1703         Orders Placed This Encounter   Procedures    Inpatient Admission (expected length of stay for this patient Order details is greater than two midnights)       Standing Status:   Standing       Number of Occurrences:   1       Order Specific Question:   Admitting Physician       Answer:   BLANCHE MANUEL [55]       Order Specific Question:   Level of Care       Answer:   Med Surg [16]       Order Specific Question:   Estimated length of stay       Answer:   More than 2 Midnights       Order Specific Question:   Certification       Answer:   I certify that inpatient services are medically necessary for this patient for a duration of greater than two midnights  See H&P and MD Progress Notes for additional information about the patient's course of treatment       ED: Date/Time/Mode of Arrival:             ED Arrival Information      Expected Arrival Acuity Means of Arrival Escorted By Service Admission Type     - 3/15/2019 14:48 Urgent [facility]-In [facility] Urgent     Arrival Complaint     cough, congestion          Chief Complaint:        Chief Complaint   Patient presents with    Shortness of Breath       Patient states she was diagnosed with pneumonia last week and followed up with PCP and placed on steroids and antibiotic    Patient states she is not feeling any better      Assessment/Plan: Acute exacerbation of asthma-unresponsive to outpatient treatment-IV steroids and monitor peak flows regularly  2   Hypertension-continue usual meds  3   Recent viral illness  4  Tobacco abuse-will place on nicotine patch and meds encouraged in cessation       66  Y O  female  Presents to  ED   From home  C/o increasing cough after feeling  Ill for past  2 weeks  +  Family  Sick contacts  She had an OP CXR  On  3/8 and was started om steroids  She  C/o  Ongoing wheezing  Despite  Heart nebs and  Came to ED  Started  With  Asthma  Symptoms  approx  3  Years  Ago      Patient currently having heart now have been using accessory muscles for breathing      Pulmonary/Chest: No stridor  No respiratory distress   She has wheezes    Scattered rhonchi in the upper airway            ED Vital Signs:            ED Triage Vitals   Temperature Pulse Respirations Blood Pressure SpO2   03/15/19 1510 03/15/19 1510 03/15/19 1510 03/15/19 1510 03/15/19 1510   (!) 97 1 °F (36 2 °C) 80 (!) 24 168/88 96 %       Temp Source Heart Rate Source Patient Position - Orthostatic VS BP Location FiO2 (%)   03/15/19 2017 03/15/19 1759 03/15/19 1759 03/15/19 1759 --   Oral Monitor Lying Right arm         Pain Score           03/15/19 1510           No Pain                Wt Readings from Last 1 Encounters:   03/15/19 85 kg (187 lb 6 3 oz)      Vital Signs (abnormal):     above     Pertinent Labs/Diagnostic Test Results:    WBC   14 36  Abs neutro    9 41  CXR:     No focal consolidation, pleural effusion, or pneumothorax      ED Treatment:               Medication Administration from 03/15/2019 1448 to 03/15/2019 2008        Date/Time Order Dose Route Action Action by Comments       03/15/2019 1715 sodium chloride 0 9 % bolus 1,000 mL 0 mL Intravenous Stopped Ewelina Guzman RN         03/15/2019 1528 sodium chloride 0 9 % bolus 1,000 mL 1,000 mL Intravenous New Bag Ewelina Guzman RN         03/15/2019 1528 ipratropium-albuterol (DUO-NEB) 0 5-2 5 mg/3 mL inhalation solution 3 mL 3 mL Nebulization Given Ewelina Guzman RN         03/15/2019 1528 methylPREDNISolone sodium succinate (Solu-MEDROL) injection 40 mg 40 mg Intravenous Given Ewelina Guzman RN         03/15/2019 1552 magnesium sulfate 2 g/50 mL IVPB (premix) 2 g 0 g Intravenous Stopped Richa Pal RN         03/15/2019 1528 magnesium sulfate 2 g/50 mL IVPB (premix) 2 g 2 g Intravenous New Bag Richa Pal RN         03/15/2019 1616 albuterol inhalation solution 10 mg 10 mg Nebulization Given Richa Pal RN         03/15/2019 1618 ipratropium (ATROVENT) 0 02 % inhalation solution 1 mg 1 mg Nebulization Given Richa Pal RN         03/15/2019 1618 sodium chloride 0 9 % inhalation solution 12 mL 12 mL Nebulization Given Richa Pal RN            Past Medical/Surgical History:      Admitting Diagnosis: Cough [R05]  Asthma exacerbation [J45 901]      Age/Sex: 66 y o  female      Admission Orders:    IP  3/15  @  1704  Scheduled Meds:   Current Facility-Administered Medications:  acetaminophen 650 mg Oral Q6H PRN Tiffany Fly, DO     albuterol 2 puff Inhalation Q4H PRN Tiffany Fly, DO     albuterol 2 5 mg Nebulization TID Tiffany Fly, DO     benzonatate 100 mg Oral TID PRN Dot Mount, DO     cholecalciferol 1,000 Units Oral Daily Tiffany Fly, DO     docusate sodium 100 mg Oral BID Tiffany Fly, DO     fenofibrate 48 mg Oral Daily Tiffany Fly, DO     fluticasone 2 puff Inhalation BID Tiffany Fly, DO     fluticasone-vilanterol 1 puff Inhalation Daily Tiffany Fly, DO     guaiFENesin 600 mg Oral Q12H Tiffany Fly, DO     influenza vaccine 0 5 mL Intramuscular Prior to discharge Tiffany Fly, DO     insulin lispro 1-5 Units Subcutaneous HS Tiffany Fly, DO     levothyroxine 200 mcg Oral Daily Tiffany Fly, DO     lisinopril 20 mg Oral Daily Tiffany Fly, DO     metFORMIN 1,500 mg Oral Daily With Breakfast Tiffany Fly, DO     methylPREDNISolone sodium succinate 40 mg Intravenous Q8H [address] Tiffany Fly, DO     montelukast 10 mg Oral HS Tiffany Fly, DO     nicotine 1 patch Transdermal Daily Tiffany Fly, DO     ondansetron 4 mg Intravenous Q6H PRN Tiffany Fly, DO     sodium chloride 125 mL/hr Intravenous Continuous Tiffany Fly, DO Last Rate: 125 mL/hr (03/16/19 1943)    Continuous Infusions:   sodium chloride 125 mL/hr Last Rate: 125 mL/hr (03/16/19 0450)      PRN Meds:   acetaminophen    albuterol    benzonatate    influenza vaccine    ondansetron      CCD diet  PF  Pre and post  Treatment  [facility]  Pulmonary        Network Utilization Review Department  Phone: [phone]; Fax [phone]  [email or web address]  ATTENTION: Please call with any questions or concerns to [phone]  and carefully listen to the prompts so that you are directed to the right person  Send all requests for admission clinical reviews, approved or denied determinations and any other requests to fax [phone]   All voicemails are confidential

## 2019-03-19 ENCOUNTER — TRANSITIONAL CARE MANAGEMENT (OUTPATIENT)
Dept: FAMILY MEDICINE CLINIC | Facility: HOSPITAL | Age: 59
End: 2019-03-19

## 2019-03-20 ENCOUNTER — TELEPHONE (OUTPATIENT)
Dept: FAMILY MEDICINE CLINIC | Facility: HOSPITAL | Age: 59
End: 2019-03-20

## 2019-03-21 NOTE — UTILIZATION REVIEW
Auth:   [id number]    Notification of Discharge  This is a Notification of Discharge from our facility [address]  Please be advised that this patient has been discharge from our facility  Below you will find the admission and discharge date and time including the patients disposition  PRESENTATION DATE: 3/15/2019  3:01 PM  IP ADMISSION DATE: 3/15/19 1703  DISCHARGE DATE: 3/18/2019  3:32 PM  DISPOSITION: [id number] Utilization Review Department  Phone: [phone]; Fax [phone]  [email or web address]  ATTENTION: Please call with any questions or concerns to [phone]  and carefully listen to the prompts so that you are directed to the right person  Send all requests for admission clinical reviews, approved or denied determinations and any other requests to fax [phone]   All voicemails are confidential

## 2019-03-28 ENCOUNTER — OFFICE VISIT (OUTPATIENT)
Dept: FAMILY MEDICINE CLINIC | Facility: HOSPITAL | Age: 59
End: 2019-03-28
Payer: COMMERCIAL

## 2019-03-28 VITALS
SYSTOLIC BLOOD PRESSURE: 120 MMHG | BODY MASS INDEX: 34.02 KG/M2 | HEART RATE: 99 BPM | WEIGHT: 192 LBS | TEMPERATURE: 98.6 F | HEIGHT: 63 IN | DIASTOLIC BLOOD PRESSURE: 82 MMHG

## 2019-03-28 DIAGNOSIS — Z72.0 CURRENT NICOTINE USE: ICD-10-CM

## 2019-03-28 DIAGNOSIS — E11.9 CONTROLLED TYPE 2 DIABETES MELLITUS WITHOUT COMPLICATION, WITHOUT LONG-TERM CURRENT USE OF INSULIN (HCC): ICD-10-CM

## 2019-03-28 DIAGNOSIS — Z09 HOSPITAL DISCHARGE FOLLOW-UP: Primary | ICD-10-CM

## 2019-03-28 DIAGNOSIS — J45.41 MODERATE PERSISTENT ASTHMA WITH ACUTE EXACERBATION: ICD-10-CM

## 2019-03-28 PROCEDURE — 99495 TRANSJ CARE MGMT MOD F2F 14D: CPT | Performed by: INTERNAL MEDICINE

## 2019-03-28 PROCEDURE — 1111F DSCHRG MED/CURRENT MED MERGE: CPT | Performed by: INTERNAL MEDICINE

## 2019-03-28 NOTE — ASSESSMENT & PLAN NOTE
Lab Results   Component Value Date    HGBA1C 5 8 (H) 11/06/2018       No results for input(s): POCGLU in the last 72 hours      Blood Sugar Average: Last 72 hrs:  Sugars were fluctuating btw 's while IP - likely related to Prednisone, almost done with steroid, requesting glucometer for home sugar checks - orders written but advised pt to check approx 2 times a week as her DM is pretty well controlled off the steroids, had DM labs to be done prior to appt in May - order reprinted for her today, foot exam done 11/18, eye exam done 6/18, on ACE but no statin

## 2019-03-28 NOTE — ASSESSMENT & PLAN NOTE
No smoking in over a month!!  Congratulated, admits to no cravings, encouraged to con't with cessation

## 2019-03-28 NOTE — ASSESSMENT & PLAN NOTE
Just about back to baseline - has 2 days left of Prednisone - only notes sleep issues as SE, is asking to check her sugars at home to see what has been happening with them, notes no recent use of rescue inhaler, has f/u with Pulm for end of April, is ready to go back to work next week - note written to return 4/1/19, call with relapse/worse symptoms, con't inhalers as directed, congratulated on quitting smoking!

## 2019-03-28 NOTE — PROGRESS NOTES
Assessment/Plan: Moderate persistent asthma with acute exacerbation  Just about back to baseline - has 2 days left of Prednisone - only notes sleep issues as SE, is asking to check her sugars at home to see what has been happening with them, notes no recent use of rescue inhaler, has f/u with Pulm for end of April, is ready to go back to work next week - note written to return 4/1/19, call with relapse/worse symptoms, con't inhalers as directed, congratulated on quitting smoking! Current nicotine use  No smoking in over a month!!  Congratulated, admits to no cravings, encouraged to con't with cessation    Controlled type 2 diabetes mellitus without complication, without long-term current use of insulin (HCC)  Lab Results   Component Value Date    HGBA1C 5 8 (H) 11/06/2018       No results for input(s): POCGLU in the last 72 hours  Blood Sugar Average: Last 72 hrs:  Sugars were fluctuating btw 's while IP - likely related to Prednisone, almost done with steroid, requesting glucometer for home sugar checks - orders written but advised pt to check approx 2 times a week as her DM is pretty well controlled off the steroids, had DM labs to be done prior to appt in May - order reprinted for her today, foot exam done 11/18, eye exam done 6/18, on ACE but no statin       Diagnoses and all orders for this visit:    Hospital discharge follow-up    Moderate persistent asthma with acute exacerbation    Current nicotine use    Controlled type 2 diabetes mellitus without complication, without long-term current use of insulin (HCC)  -     Glucometer  -     Glucometer test strips  -     Lancets          Subjective:      Patient ID: Crispin Mckinley is a 66 y o  female  HPI Pt here for RENETTA/Hospital follow up  Pt was admitted 3/15/19 to 3/18/19 at [facility] records were reviewed by myself in detail and events are summarized below      TCM Call (since 2/25/2019)     Hospital care reviewed  Records reviewed Patient was hospitialized at  [facility]    Date of Admission  03/15/19    Date of discharge  03/18/19    Diagnosis  Asthma exacerbation    Disposition  Home    Were the patients medications reviewed and updated  No    Current Symptoms  Shortness of breath    Shortness of breath severity  Moderate      TCM Call (since 2/25/2019)     Post hospital issues  None    Should patient be enrolled in anticoag monitoring? No    Scheduled for follow up? Yes    Patients specialists  Pulmonlolgist    Did you obtain your prescribed medications  Yes    Do you need help managing your prescriptions or medications  No    Is transportation to your appointment needed  No    I have advised the patient to call PCP with any new or worsening symptoms  Tru Sargent MA         Pt presented to [facility] ER on 3/15/19 with c/o worsening SOB  She had finished 2 coursed of Prednisone as well as a Zpack as OP for resp illness and asthma exacerbation  Despite these tx she con't to have worsening SOB  In the ED she was afebril but resp rate was 24 and BP was 168/88  O2 sat was 96% on RA  BW was done and WBC was up at 14 36 but rest of CBC/CMP was wnl  CXR showed no acute dz  ECG showed NSR with NS T wave changes  She did have a flu test done which was negaative  Pt was admitted for acute asthma exacerbation  She was tx with IV Solu-Medrol as well as DuoNebs and given nicotine patch  Pulm was consulted and her inhalers were changed - Breo cont' but Flovent was stopped  Xopenex nebs were given q 8 hrs  She was recommended to use AirDuo respclicks upon discharge  Pt improved clinically and was discharged home on 3/18/19  Med list was reviewed  She is doing well since being home  She is still using her airDuo inhaler  She has 2 more days of Prednisone left  She is not using her rescue inhaler  She has not smoked in over a month  She notes her sugars were fluctuating from 70's to 140s while IP    She has no glucometer at home and is asking to check her sugars due to the fluctuations  She has DM labs to be done prior to her f/u appt in May  She has f/u with [facility] April 22nd  Mammo 4/18    [place] 9/14 - 5 yrs    PAP 11/18    Review of Systems   Constitutional: Negative for chills, fever and unexpected weight change  HENT: Negative for congestion and sore throat  Eyes: Negative for pain and visual disturbance  Respiratory: Positive for cough  Negative for shortness of breath and wheezing  Cardiovascular: Negative for chest pain, palpitations and leg swelling  Gastrointestinal: Negative for abdominal pain, blood in stool, constipation, diarrhea and nausea  Endocrine: Negative for polydipsia and polyuria  Genitourinary: Negative for difficulty urinating and dysuria  Musculoskeletal: Negative for back pain and neck pain  Skin: Negative for rash and wound  Neurological: Negative for dizziness, light-headedness and headaches  Hematological: Negative for adenopathy  Psychiatric/Behavioral: Positive for sleep disturbance  Negative for behavioral problems and confusion  Objective:    /82 (BP Location: Right arm, Patient Position: Sitting, Cuff Size: Standard)   Pulse 99   Temp 98 6 °F (37 °C)   Ht 5' 3" (1 6 m)   Wt 87 1 kg (192 lb)   LMP  (LMP Unknown)   BMI 34 01 kg/m²      Physical Exam   Constitutional: She appears well-developed and well-nourished  No distress  HENT:   Head: Normocephalic and atraumatic  Right Ear: External ear normal    Left Ear: External ear normal    Mouth/Throat: Oropharynx is clear and moist    Eyes: Conjunctivae are normal  Right eye exhibits no discharge  Left eye exhibits no discharge  Neck: Neck supple  No tracheal deviation present  Cardiovascular: Normal rate, regular rhythm and normal heart sounds  Exam reveals no friction rub  No murmur heard  Pulmonary/Chest: Effort normal and breath sounds normal  No respiratory distress  She has no wheezes  She has no rales  Abdominal: Soft  She exhibits no distension  There is no tenderness  There is no guarding  Musculoskeletal: She exhibits no edema  Lymphadenopathy:     She has cervical adenopathy  Neurological: She is alert  She exhibits normal muscle tone  Skin: Skin is warm and dry  No rash noted  Psychiatric: She has a normal mood and affect  Her behavior is normal    Nursing note and vitals reviewed

## 2019-04-09 ENCOUNTER — TELEPHONE (OUTPATIENT)
Dept: FAMILY MEDICINE CLINIC | Facility: HOSPITAL | Age: 59
End: 2019-04-09

## 2019-04-17 LAB
ALBUMIN SERPL-MCNC: 4.5 G/DL (ref 3.5–5.5)
ALBUMIN/CREAT UR: 12 MG/G CREAT (ref 0–30)
ALBUMIN/GLOB SERPL: 1.3 {RATIO} (ref 1.2–2.2)
ALP SERPL-CCNC: 58 IU/L (ref 39–117)
ALT SERPL-CCNC: 42 IU/L (ref 0–32)
AST SERPL-CCNC: 35 IU/L (ref 0–40)
BASOPHILS # BLD AUTO: 0.1 X10E3/UL (ref 0–0.2)
BASOPHILS NFR BLD AUTO: 1 %
BILIRUB SERPL-MCNC: 0.3 MG/DL (ref 0–1.2)
BUN SERPL-MCNC: 20 MG/DL (ref 6–24)
BUN/CREAT SERPL: 26 (ref 9–23)
CALCIUM SERPL-MCNC: 10 MG/DL (ref 8.7–10.2)
CHLORIDE SERPL-SCNC: 102 MMOL/L (ref 96–106)
CHOLEST SERPL-MCNC: 162 MG/DL (ref 100–199)
CHOLEST/HDLC SERPL: 4.5 RATIO (ref 0–4.4)
CO2 SERPL-SCNC: 23 MMOL/L (ref 20–29)
CREAT SERPL-MCNC: 0.77 MG/DL (ref 0.57–1)
CREAT UR-MCNC: 126.9 MG/DL
EOSINOPHIL # BLD AUTO: 0.2 X10E3/UL (ref 0–0.4)
EOSINOPHIL NFR BLD AUTO: 4 %
ERYTHROCYTE [DISTWIDTH] IN BLOOD BY AUTOMATED COUNT: 13.9 % (ref 12.3–15.4)
EST. AVERAGE GLUCOSE BLD GHB EST-MCNC: 126 MG/DL
GLOBULIN SER-MCNC: 3.4 G/DL (ref 1.5–4.5)
GLUCOSE SERPL-MCNC: 99 MG/DL (ref 65–99)
HBA1C MFR BLD: 6 % (ref 4.8–5.6)
HCT VFR BLD AUTO: 42.9 % (ref 34–46.6)
HDLC SERPL-MCNC: 36 MG/DL
HGB BLD-MCNC: 14 G/DL (ref 11.1–15.9)
IMM GRANULOCYTES # BLD: 0 X10E3/UL (ref 0–0.1)
IMM GRANULOCYTES NFR BLD: 1 %
LDLC SERPL CALC-MCNC: 98 MG/DL (ref 0–99)
LYMPHOCYTES # BLD AUTO: 2.1 X10E3/UL (ref 0.7–3.1)
LYMPHOCYTES NFR BLD AUTO: 41 %
MCH RBC QN AUTO: 30.6 PG (ref 26.6–33)
MCHC RBC AUTO-ENTMCNC: 32.6 G/DL (ref 31.5–35.7)
MCV RBC AUTO: 94 FL (ref 79–97)
MICROALBUMIN UR-MCNC: 15.2 UG/ML
MONOCYTES # BLD AUTO: 0.6 X10E3/UL (ref 0.1–0.9)
MONOCYTES NFR BLD AUTO: 13 %
NEUTROPHILS # BLD AUTO: 2 X10E3/UL (ref 1.4–7)
NEUTROPHILS NFR BLD AUTO: 40 %
PLATELET # BLD AUTO: 282 X10E3/UL (ref 150–379)
POTASSIUM SERPL-SCNC: 4.5 MMOL/L (ref 3.5–5.2)
PROT SERPL-MCNC: 7.9 G/DL (ref 6–8.5)
RBC # BLD AUTO: 4.58 X10E6/UL (ref 3.77–5.28)
SL AMB EGFR AFRICAN AMERICAN: 100 ML/MIN/1.73
SL AMB EGFR NON AFRICAN AMERICAN: 87 ML/MIN/1.73
SL AMB VLDL CHOLESTEROL CALC: 28 MG/DL (ref 5–40)
SODIUM SERPL-SCNC: 141 MMOL/L (ref 134–144)
T4 FREE SERPL-MCNC: 1.98 NG/DL (ref 0.82–1.77)
TRIGL SERPL-MCNC: 138 MG/DL (ref 0–149)
TSH SERPL DL<=0.005 MIU/L-ACNC: 0.77 UIU/ML (ref 0.45–4.5)
WBC # BLD AUTO: 5 X10E3/UL (ref 3.4–10.8)

## 2019-05-06 ENCOUNTER — OFFICE VISIT (OUTPATIENT)
Dept: FAMILY MEDICINE CLINIC | Facility: HOSPITAL | Age: 59
End: 2019-05-06
Payer: COMMERCIAL

## 2019-05-06 VITALS
HEART RATE: 98 BPM | HEIGHT: 63 IN | BODY MASS INDEX: 34.66 KG/M2 | WEIGHT: 195.6 LBS | DIASTOLIC BLOOD PRESSURE: 62 MMHG | SYSTOLIC BLOOD PRESSURE: 118 MMHG | TEMPERATURE: 98.2 F

## 2019-05-06 DIAGNOSIS — E03.8 OTHER SPECIFIED HYPOTHYROIDISM: ICD-10-CM

## 2019-05-06 DIAGNOSIS — L40.9 PSORIASIS: ICD-10-CM

## 2019-05-06 DIAGNOSIS — E11.9 CONTROLLED TYPE 2 DIABETES MELLITUS WITHOUT COMPLICATION, WITHOUT LONG-TERM CURRENT USE OF INSULIN (HCC): ICD-10-CM

## 2019-05-06 DIAGNOSIS — I10 ESSENTIAL HYPERTENSION: ICD-10-CM

## 2019-05-06 DIAGNOSIS — R20.2 PARESTHESIAS: ICD-10-CM

## 2019-05-06 DIAGNOSIS — E78.1 ESSENTIAL HYPERTRIGLYCERIDEMIA: Primary | ICD-10-CM

## 2019-05-06 PROBLEM — Z72.0 CURRENT NICOTINE USE: Status: RESOLVED | Noted: 2019-03-12 | Resolved: 2019-05-06

## 2019-05-06 PROCEDURE — 90471 IMMUNIZATION ADMIN: CPT | Performed by: INTERNAL MEDICINE

## 2019-05-06 PROCEDURE — 3074F SYST BP LT 130 MM HG: CPT | Performed by: INTERNAL MEDICINE

## 2019-05-06 PROCEDURE — 90715 TDAP VACCINE 7 YRS/> IM: CPT | Performed by: INTERNAL MEDICINE

## 2019-05-06 PROCEDURE — 3078F DIAST BP <80 MM HG: CPT | Performed by: INTERNAL MEDICINE

## 2019-05-06 PROCEDURE — 99214 OFFICE O/P EST MOD 30 MIN: CPT | Performed by: INTERNAL MEDICINE

## 2019-05-06 RX ORDER — LEVOTHYROXINE SODIUM 0.2 MG/1
TABLET ORAL
Qty: 90 TABLET | Refills: 2
Start: 2019-05-06 | End: 2020-06-30 | Stop reason: SDUPTHER

## 2019-05-08 ENCOUNTER — HOSPITAL ENCOUNTER (OUTPATIENT)
Dept: NEUROLOGY | Facility: CLINIC | Age: 59
Discharge: HOME | End: 2019-05-08
Payer: COMMERCIAL

## 2019-05-08 DIAGNOSIS — R20.2 PARESTHESIAS: ICD-10-CM

## 2019-05-08 DIAGNOSIS — G56.03 BILATERAL CARPAL TUNNEL SYNDROME: Primary | ICD-10-CM

## 2019-05-08 PROCEDURE — 95911 NRV CNDJ TEST 9-10 STUDIES: CPT | Performed by: PHYSICAL MEDICINE & REHABILITATION

## 2019-05-08 PROCEDURE — 95886 MUSC TEST DONE W/N TEST COMP: CPT | Performed by: PHYSICAL MEDICINE & REHABILITATION

## 2019-05-21 ENCOUNTER — HOSPITAL ENCOUNTER (OUTPATIENT)
Dept: BONE DENSITY | Facility: IMAGING CENTER | Age: 59
Discharge: HOME | End: 2019-05-21
Payer: COMMERCIAL

## 2019-05-21 VITALS — BODY MASS INDEX: 34.55 KG/M2 | HEIGHT: 63 IN | WEIGHT: 195 LBS

## 2019-05-21 DIAGNOSIS — Z12.31 ENCOUNTER FOR SCREENING MAMMOGRAM FOR BREAST CANCER: ICD-10-CM

## 2019-05-21 PROCEDURE — 77067 SCR MAMMO BI INCL CAD: CPT

## 2019-06-03 ENCOUNTER — OFFICE VISIT (OUTPATIENT)
Dept: OBGYN CLINIC | Facility: CLINIC | Age: 59
End: 2019-06-03
Payer: COMMERCIAL

## 2019-06-03 VITALS
WEIGHT: 195 LBS | HEIGHT: 63 IN | SYSTOLIC BLOOD PRESSURE: 123 MMHG | HEART RATE: 80 BPM | DIASTOLIC BLOOD PRESSURE: 83 MMHG | BODY MASS INDEX: 34.55 KG/M2

## 2019-06-03 DIAGNOSIS — G56.03 BILATERAL CARPAL TUNNEL SYNDROME: ICD-10-CM

## 2019-06-03 PROCEDURE — 99203 OFFICE O/P NEW LOW 30 MIN: CPT | Performed by: ORTHOPAEDIC SURGERY

## 2019-06-03 RX ORDER — CEFAZOLIN SODIUM 2 G/50ML
2000 SOLUTION INTRAVENOUS ONCE
Status: CANCELLED | OUTPATIENT
Start: 2019-06-03 | End: 2019-06-03

## 2019-06-04 PROBLEM — G56.03 BILATERAL CARPAL TUNNEL SYNDROME: Status: ACTIVE | Noted: 2019-06-04

## 2019-06-07 ENCOUNTER — TRANSCRIBE ORDERS (OUTPATIENT)
Dept: ADMINISTRATIVE | Facility: HOSPITAL | Age: 59
End: 2019-06-07

## 2019-06-07 ENCOUNTER — APPOINTMENT (OUTPATIENT)
Dept: LAB | Facility: HOSPITAL | Age: 59
End: 2019-06-07
Attending: ORTHOPAEDIC SURGERY
Payer: COMMERCIAL

## 2019-06-07 ENCOUNTER — APPOINTMENT (OUTPATIENT)
Dept: LAB | Facility: HOSPITAL | Age: 59
End: 2019-06-07
Payer: COMMERCIAL

## 2019-06-07 DIAGNOSIS — Z01.818 PRE-OP TESTING: Primary | ICD-10-CM

## 2019-06-07 DIAGNOSIS — Z01.818 PRE-OP TESTING: ICD-10-CM

## 2019-06-07 DIAGNOSIS — G56.03 BILATERAL CARPAL TUNNEL SYNDROME: Primary | ICD-10-CM

## 2019-06-07 LAB
ANION GAP SERPL CALCULATED.3IONS-SCNC: 8 MMOL/L (ref 4–13)
BASOPHILS # BLD AUTO: 0.05 THOUSANDS/ΜL (ref 0–0.1)
BASOPHILS NFR BLD AUTO: 1 % (ref 0–1)
BUN SERPL-MCNC: 22 MG/DL (ref 5–25)
CALCIUM SERPL-MCNC: 9.1 MG/DL (ref 8.3–10.1)
CHLORIDE SERPL-SCNC: 107 MMOL/L (ref 100–108)
CO2 SERPL-SCNC: 28 MMOL/L (ref 21–32)
CREAT SERPL-MCNC: 1.14 MG/DL (ref 0.6–1.3)
EOSINOPHIL # BLD AUTO: 0.48 THOUSAND/ΜL (ref 0–0.61)
EOSINOPHIL NFR BLD AUTO: 8 % (ref 0–6)
ERYTHROCYTE [DISTWIDTH] IN BLOOD BY AUTOMATED COUNT: 12.3 % (ref 11.6–15.1)
GFR SERPL CREATININE-BSD FRML MDRD: 54 ML/MIN/1.73SQ M
GLUCOSE SERPL-MCNC: 89 MG/DL (ref 65–140)
HCT VFR BLD AUTO: 39.8 % (ref 34.8–46.1)
HGB BLD-MCNC: 13.5 G/DL (ref 11.5–15.4)
IMM GRANULOCYTES # BLD AUTO: 0.04 THOUSAND/UL (ref 0–0.2)
IMM GRANULOCYTES NFR BLD AUTO: 1 % (ref 0–2)
LYMPHOCYTES # BLD AUTO: 2.41 THOUSANDS/ΜL (ref 0.6–4.47)
LYMPHOCYTES NFR BLD AUTO: 39 % (ref 14–44)
MCH RBC QN AUTO: 31.3 PG (ref 26.8–34.3)
MCHC RBC AUTO-ENTMCNC: 33.9 G/DL (ref 31.4–37.4)
MCV RBC AUTO: 92 FL (ref 82–98)
MONOCYTES # BLD AUTO: 0.64 THOUSAND/ΜL (ref 0.17–1.22)
MONOCYTES NFR BLD AUTO: 10 % (ref 4–12)
NEUTROPHILS # BLD AUTO: 2.51 THOUSANDS/ΜL (ref 1.85–7.62)
NEUTS SEG NFR BLD AUTO: 41 % (ref 43–75)
NRBC BLD AUTO-RTO: 0 /100 WBCS
PLATELET # BLD AUTO: 305 THOUSANDS/UL (ref 149–390)
PMV BLD AUTO: 10.5 FL (ref 8.9–12.7)
POTASSIUM SERPL-SCNC: 3.9 MMOL/L (ref 3.5–5.3)
RBC # BLD AUTO: 4.32 MILLION/UL (ref 3.81–5.12)
SODIUM SERPL-SCNC: 143 MMOL/L (ref 136–145)
WBC # BLD AUTO: 6.13 THOUSAND/UL (ref 4.31–10.16)

## 2019-06-07 PROCEDURE — 85025 COMPLETE CBC W/AUTO DIFF WBC: CPT

## 2019-06-07 PROCEDURE — 80048 BASIC METABOLIC PNL TOTAL CA: CPT

## 2019-06-07 PROCEDURE — 36415 COLL VENOUS BLD VENIPUNCTURE: CPT

## 2019-06-12 ENCOUNTER — TELEPHONE (OUTPATIENT)
Dept: OBGYN CLINIC | Facility: CLINIC | Age: 59
End: 2019-06-12

## 2019-06-24 ENCOUNTER — ANESTHESIA EVENT (OUTPATIENT)
Dept: PERIOP | Facility: HOSPITAL | Age: 59
End: 2019-06-24
Payer: COMMERCIAL

## 2019-06-25 ENCOUNTER — HOSPITAL ENCOUNTER (OUTPATIENT)
Facility: HOSPITAL | Age: 59
Setting detail: OUTPATIENT SURGERY
Discharge: HOME | End: 2019-06-25
Attending: ORTHOPAEDIC SURGERY | Admitting: ORTHOPAEDIC SURGERY
Payer: COMMERCIAL

## 2019-06-25 ENCOUNTER — ANESTHESIA (OUTPATIENT)
Dept: PERIOP | Facility: HOSPITAL | Age: 59
End: 2019-06-25
Payer: COMMERCIAL

## 2019-06-25 VITALS
BODY MASS INDEX: 34.55 KG/M2 | OXYGEN SATURATION: 96 % | HEIGHT: 63 IN | DIASTOLIC BLOOD PRESSURE: 66 MMHG | RESPIRATION RATE: 18 BRPM | WEIGHT: 195 LBS | HEART RATE: 77 BPM | TEMPERATURE: 99.3 F | SYSTOLIC BLOOD PRESSURE: 122 MMHG

## 2019-06-25 DIAGNOSIS — G56.03 BILATERAL CARPAL TUNNEL SYNDROME: Primary | ICD-10-CM

## 2019-06-25 LAB
GLUCOSE SERPL-MCNC: 106 MG/DL (ref 65–140)
GLUCOSE SERPL-MCNC: 86 MG/DL (ref 65–140)

## 2019-06-25 PROCEDURE — 82948 REAGENT STRIP/BLOOD GLUCOSE: CPT

## 2019-06-25 PROCEDURE — 29848 WRIST ENDOSCOPY/SURGERY: CPT | Performed by: PHYSICIAN ASSISTANT

## 2019-06-25 PROCEDURE — 29848 WRIST ENDOSCOPY/SURGERY: CPT | Performed by: ORTHOPAEDIC SURGERY

## 2019-06-25 RX ORDER — HYDROCODONE BITARTRATE AND ACETAMINOPHEN 5; 325 MG/1; MG/1
1 TABLET ORAL EVERY 6 HOURS PRN
Status: DISCONTINUED | OUTPATIENT
Start: 2019-06-25 | End: 2019-06-25 | Stop reason: HOSPADM

## 2019-06-25 RX ORDER — LIDOCAINE HYDROCHLORIDE AND EPINEPHRINE 10; 10 MG/ML; UG/ML
INJECTION, SOLUTION INFILTRATION; PERINEURAL AS NEEDED
Status: DISCONTINUED | OUTPATIENT
Start: 2019-06-25 | End: 2019-06-25 | Stop reason: HOSPADM

## 2019-06-25 RX ORDER — ONDANSETRON 2 MG/ML
4 INJECTION INTRAMUSCULAR; INTRAVENOUS ONCE AS NEEDED
Status: DISCONTINUED | OUTPATIENT
Start: 2019-06-25 | End: 2019-06-25 | Stop reason: HOSPADM

## 2019-06-25 RX ORDER — SODIUM CHLORIDE, SODIUM LACTATE, POTASSIUM CHLORIDE, CALCIUM CHLORIDE 600; 310; 30; 20 MG/100ML; MG/100ML; MG/100ML; MG/100ML
125 INJECTION, SOLUTION INTRAVENOUS CONTINUOUS
Status: DISCONTINUED | OUTPATIENT
Start: 2019-06-25 | End: 2019-06-25 | Stop reason: HOSPADM

## 2019-06-25 RX ORDER — DEXAMETHASONE SODIUM PHOSPHATE 4 MG/ML
INJECTION, SOLUTION INTRA-ARTICULAR; INTRALESIONAL; INTRAMUSCULAR; INTRAVENOUS; SOFT TISSUE AS NEEDED
Status: DISCONTINUED | OUTPATIENT
Start: 2019-06-25 | End: 2019-06-25 | Stop reason: SURG

## 2019-06-25 RX ORDER — CEFAZOLIN SODIUM 2 G/50ML
SOLUTION INTRAVENOUS AS NEEDED
Status: DISCONTINUED | OUTPATIENT
Start: 2019-06-25 | End: 2019-06-25 | Stop reason: SURG

## 2019-06-25 RX ORDER — MIDAZOLAM HYDROCHLORIDE 1 MG/ML
INJECTION INTRAMUSCULAR; INTRAVENOUS AS NEEDED
Status: DISCONTINUED | OUTPATIENT
Start: 2019-06-25 | End: 2019-06-25 | Stop reason: SURG

## 2019-06-25 RX ORDER — FENTANYL CITRATE 50 UG/ML
INJECTION, SOLUTION INTRAMUSCULAR; INTRAVENOUS AS NEEDED
Status: DISCONTINUED | OUTPATIENT
Start: 2019-06-25 | End: 2019-06-25 | Stop reason: SURG

## 2019-06-25 RX ORDER — CEFAZOLIN SODIUM 2 G/50ML
2000 SOLUTION INTRAVENOUS ONCE
Status: DISCONTINUED | OUTPATIENT
Start: 2019-06-25 | End: 2019-06-25 | Stop reason: HOSPADM

## 2019-06-25 RX ORDER — HYDROCODONE BITARTRATE AND ACETAMINOPHEN 5; 325 MG/1; MG/1
1 TABLET ORAL EVERY 6 HOURS PRN
Qty: 5 TABLET | Refills: 0 | Status: SHIPPED | OUTPATIENT
Start: 2019-06-25 | End: 2019-06-30

## 2019-06-25 RX ORDER — SENNOSIDES 8.6 MG
650 CAPSULE ORAL EVERY 8 HOURS
Qty: 15 TABLET | Refills: 0 | Status: SHIPPED | OUTPATIENT
Start: 2019-06-25 | End: 2019-10-21 | Stop reason: SDUPTHER

## 2019-06-25 RX ORDER — PROPOFOL 10 MG/ML
INJECTION, EMULSION INTRAVENOUS CONTINUOUS PRN
Status: DISCONTINUED | OUTPATIENT
Start: 2019-06-25 | End: 2019-06-25 | Stop reason: SURG

## 2019-06-25 RX ORDER — ACETAMINOPHEN 325 MG/1
650 TABLET ORAL ONCE
Status: DISCONTINUED | OUTPATIENT
Start: 2019-06-25 | End: 2019-06-25 | Stop reason: HOSPADM

## 2019-06-25 RX ORDER — LIDOCAINE HYDROCHLORIDE 10 MG/ML
0.5 INJECTION, SOLUTION EPIDURAL; INFILTRATION; INTRACAUDAL; PERINEURAL ONCE AS NEEDED
Status: DISCONTINUED | OUTPATIENT
Start: 2019-06-25 | End: 2019-06-25 | Stop reason: HOSPADM

## 2019-06-25 RX ORDER — PROPOFOL 10 MG/ML
INJECTION, EMULSION INTRAVENOUS AS NEEDED
Status: DISCONTINUED | OUTPATIENT
Start: 2019-06-25 | End: 2019-06-25 | Stop reason: SURG

## 2019-06-25 RX ORDER — NAPROXEN SODIUM 220 MG
220 TABLET ORAL 2 TIMES DAILY WITH MEALS
Qty: 10 TABLET | Refills: 0 | Status: SHIPPED | OUTPATIENT
Start: 2019-06-25 | End: 2019-08-01

## 2019-06-25 RX ORDER — ONDANSETRON 2 MG/ML
INJECTION INTRAMUSCULAR; INTRAVENOUS AS NEEDED
Status: DISCONTINUED | OUTPATIENT
Start: 2019-06-25 | End: 2019-06-25 | Stop reason: SURG

## 2019-06-25 RX ORDER — FENTANYL CITRATE/PF 50 MCG/ML
25 SYRINGE (ML) INJECTION
Status: DISCONTINUED | OUTPATIENT
Start: 2019-06-25 | End: 2019-06-25 | Stop reason: HOSPADM

## 2019-06-25 RX ADMIN — PROPOFOL 40 MG: 10 INJECTION, EMULSION INTRAVENOUS at 08:05

## 2019-06-25 RX ADMIN — PHENYLEPHRINE HYDROCHLORIDE 100 MCG: 10 INJECTION INTRAVENOUS at 08:29

## 2019-06-25 RX ADMIN — DEXAMETHASONE SODIUM PHOSPHATE 4 MG: 4 INJECTION, SOLUTION INTRAMUSCULAR; INTRAVENOUS at 08:16

## 2019-06-25 RX ADMIN — ONDANSETRON 4 MG: 2 INJECTION INTRAMUSCULAR; INTRAVENOUS at 08:05

## 2019-06-25 RX ADMIN — PHENYLEPHRINE HYDROCHLORIDE 100 MCG: 10 INJECTION INTRAVENOUS at 08:21

## 2019-06-25 RX ADMIN — PROPOFOL 100 MCG/KG/MIN: 10 INJECTION, EMULSION INTRAVENOUS at 08:05

## 2019-06-25 RX ADMIN — PHENYLEPHRINE HYDROCHLORIDE 200 MCG: 10 INJECTION INTRAVENOUS at 08:35

## 2019-06-25 RX ADMIN — PROPOFOL 150 MG: 10 INJECTION, EMULSION INTRAVENOUS at 08:12

## 2019-06-25 RX ADMIN — PHENYLEPHRINE HYDROCHLORIDE 100 MCG: 10 INJECTION INTRAVENOUS at 08:24

## 2019-06-25 RX ADMIN — PHENYLEPHRINE HYDROCHLORIDE 100 MCG: 10 INJECTION INTRAVENOUS at 08:17

## 2019-06-25 RX ADMIN — SODIUM CHLORIDE, SODIUM LACTATE, POTASSIUM CHLORIDE, AND CALCIUM CHLORIDE: .6; .31; .03; .02 INJECTION, SOLUTION INTRAVENOUS at 08:02

## 2019-06-25 RX ADMIN — FENTANYL CITRATE 25 MCG: 50 INJECTION, SOLUTION INTRAMUSCULAR; INTRAVENOUS at 08:05

## 2019-06-25 RX ADMIN — FENTANYL CITRATE 25 MCG: 50 INJECTION, SOLUTION INTRAMUSCULAR; INTRAVENOUS at 08:22

## 2019-06-25 RX ADMIN — CEFAZOLIN SODIUM 2000 MG: 2 SOLUTION INTRAVENOUS at 08:09

## 2019-06-25 RX ADMIN — MIDAZOLAM 2 MG: 1 INJECTION INTRAMUSCULAR; INTRAVENOUS at 08:04

## 2019-06-26 ENCOUNTER — TELEPHONE (OUTPATIENT)
Dept: OBGYN CLINIC | Facility: HOSPITAL | Age: 59
End: 2019-06-26

## 2019-06-27 DIAGNOSIS — E78.1 ESSENTIAL HYPERTRIGLYCERIDEMIA: ICD-10-CM

## 2019-06-27 RX ORDER — FENOFIBRATE 200 MG/1
200 CAPSULE ORAL DAILY
Qty: 90 CAPSULE | Refills: 1 | Status: SHIPPED | OUTPATIENT
Start: 2019-06-27 | End: 2020-06-18

## 2019-07-08 ENCOUNTER — OFFICE VISIT (OUTPATIENT)
Dept: OBGYN CLINIC | Facility: CLINIC | Age: 59
End: 2019-07-08
Payer: COMMERCIAL

## 2019-07-08 VITALS
WEIGHT: 193.2 LBS | SYSTOLIC BLOOD PRESSURE: 120 MMHG | BODY MASS INDEX: 34.23 KG/M2 | DIASTOLIC BLOOD PRESSURE: 84 MMHG | HEIGHT: 63 IN

## 2019-07-08 DIAGNOSIS — G56.02 CARPAL TUNNEL SYNDROME ON LEFT: Primary | ICD-10-CM

## 2019-07-08 PROCEDURE — 99214 OFFICE O/P EST MOD 30 MIN: CPT | Performed by: ORTHOPAEDIC SURGERY

## 2019-07-08 RX ORDER — LIDOCAINE HYDROCHLORIDE AND EPINEPHRINE 10; 10 MG/ML; UG/ML
20 INJECTION, SOLUTION INFILTRATION; PERINEURAL ONCE
Status: CANCELLED | OUTPATIENT
Start: 2019-07-08 | End: 2019-07-08

## 2019-07-08 NOTE — LETTER
July 8, 2019     Patient: Sylwia Troncoso   YOB: 1960   Date of Visit: 7/8/2019       To Whom it May Concern:    Deangelo Joshua is under my professional care  She was seen in my office on 7/8/2019  She may return to work on 8/12/19 without restrictions  If you have any questions or concerns, please don't hesitate to call           Sincerely,          David Cutler MD        CC: No Recipients

## 2019-07-08 NOTE — PROGRESS NOTES
Assessment:   S/p R ECTR on 6/25/19    Plan: It was discussed with the patient that they may now begin to wash their incision site with soap and water  They were instructed to let the steri-strips fall off on their own at this point in time and when they do they may begin to apply lotion to their incision site  Lotion enriched in vitamin E, coca butter, scarzone and maderma was discussed with the patient  The patient would like to proceed with a L ECTR at this time under sedation  Follow Up: After Surgery    To Do Next Visit:         CHIEF COMPLAINT:  Chief Complaint   Patient presents with    Right Wrist - Post-op         SUBJECTIVE:  Jackelyn Monterroso is a 66 y o  female who presents for follow up after s/p R ECTR on 6/25/19  Patient states that her numbness and tingling and pain into her right hand has completely resolved since surgery  Today patient has Numbness to her left hand  She has tried bracing without relief of these symptoms          PHYSICAL EXAMINATION:  Vital signs: /84   Ht 5' 3" (1 6 m)   Wt 87 6 kg (193 lb 3 2 oz)   BMI 34 22 kg/m²   General: well developed and well nourished, alert, oriented times 3 and appears comfortable  Psychiatric: Normal  Cardiovascular: No discernable arrhythmia  Pulmonary: No wheezing or stridor    RIGHT MUSCULOSKELETAL EXAMINATION:  Incision: Clean, dry, intact  Range of Motion: full composite fist possible  Neurovascular status: Neuro intact, good cap refill  Activity Restrictions: No restrictions  Done today: Sutures out and Steri strips applied    LEFT MUSCULOSKELETAL EXAMINATION:  Carpal tunnel: positive tinels to the carpal tunnel, AIN 5/5, apb 4/5, full composite fist     STUDIES REVIEWED:  No Studies to review      PROCEDURES PERFORMED:  Procedures  No Procedures performed today   Scribe Attestation    I,:   Peggy Askew am acting as a scribe while in the presence of the attending physician :        I,:   Andrea Dang MD personally performed the services described in this documentation    as scribed in my presence :

## 2019-07-12 PROBLEM — G56.02 CARPAL TUNNEL SYNDROME ON LEFT: Status: ACTIVE | Noted: 2019-07-12

## 2019-08-01 DIAGNOSIS — G56.03 BILATERAL CARPAL TUNNEL SYNDROME: Primary | ICD-10-CM

## 2019-08-01 NOTE — PRE-PROCEDURE INSTRUCTIONS
Pre-Surgery Instructions:   Medication Instructions    acetaminophen (TYLENOL 8 HOUR) 650 mg CR tablet Instructed patient per Anesthesia Guidelines   albuterol (PROVENTIL HFA,VENTOLIN HFA) 90 mcg/act inhaler Instructed patient per Anesthesia Guidelines   Brodalumab [facility]) Patient was instructed by Physician and understands   cholecalciferol (VITAMIN D3) 1,000 units tablet Instructed patient per Anesthesia Guidelines   fenofibrate micronized (LOFIBRA) 200 MG capsule Instructed patient per Anesthesia Guidelines   fluticasone-salmeterol (AIRDUO RESPICLICK) 07-77 mcg/act dry powder inhaler Instructed patient per Anesthesia Guidelines   levothyroxine 200 mcg tablet Instructed patient per Anesthesia Guidelines   lisinopril (ZESTRIL) 20 mg tablet Instructed patient per Anesthesia Guidelines   metFORMIN (GLUCOPHAGE-XR) 500 mg 24 hr tablet Instructed patient per Anesthesia Guidelines  Before your operation, you play an important role in decreasing your risk for infection by washing with special antiseptic soap  This is an effective way to reduce bacteria on the skin which may help to prevent infections at the surgical site  Please read the following directions in advance  1  In the week before your operation purchase a 4 ounce bottle of antiseptic soap containing chlorhexidine gluconate 4%  Some brand names include: Aplicare, Endure, and Hibiclens  The cost is usually less than $5 00  · For your convenience, the [address] [place] [address] carries the soap  · It may also be available at your doctor's office or pre-admission testing center, and at most retail pharmacies  · If you are allergic or sensitive to soaps containing chlorhexidine gluconate (CHG), please let your doctor know so another antiseptic soap can be suggested  · CHG antiseptic soap is for external use only  2      The day before your operation follow these directions carefully to get ready    · Place clean lines (sheets) on your bed; you should sleep on clean sheets after your evening shower  · Get clean towels and washcloths ready - you need enough for 2 showers  · Set aside clean underwear, pajamas, and clothing to wear after the shower  Reminders:  · DO NOT use any other soap or body rinse on your skin during or after the antiseptic showers  · DO NOT use lotion , powder, deodorant, or perfume/aftershave of any kind on your skin after your antiseptic shower  · DO NOT shave any body parts in the 24 hours/the day before your operation  · DO NOT get the antiseptic soap in your eyes, ears, nose, mouth, or vaginal area  3      You will need to shower the night before AND the morning of your Surgery  Shower 1:  · The evening before your operation, take the fist shower  · First, shampoo your hair with regular shampoo and rinse it completely before you use the anitseptic soap  After washing and rinsing your hair, rinse your body  · Next, use a clean wash cloth to apply the antiseptic soap and wash your body from the neck down to your toes using 1/2 bottle of the antiseptic soap  You will use the other 1/2 bottle for the second shower  · Clean the area where your incision will be; later this area well for about 2 minutes  · If you ar having head or neck surgery, wash areas with the antiseptic soap  · Rinse yourself completely with running water  · Use a clean towel to dry off  · Wear clean underwear and clothing/pajamas  Shower 2:  · The Morning of your operation, take the second shower following the same steps as Shower 1 using the second 1/2 of the bottle of antiseptic soap  · Use clean cloths and towels to was and dry yourself off  · Wear clean underwear and clothing

## 2019-08-02 ENCOUNTER — APPOINTMENT (OUTPATIENT)
Dept: LAB | Facility: HOSPITAL | Age: 59
End: 2019-08-02
Attending: ORTHOPAEDIC SURGERY
Payer: COMMERCIAL

## 2019-08-02 DIAGNOSIS — G56.03 BILATERAL CARPAL TUNNEL SYNDROME: ICD-10-CM

## 2019-08-02 LAB
ANION GAP SERPL CALCULATED.3IONS-SCNC: 9 MMOL/L (ref 4–13)
BASOPHILS # BLD AUTO: 0.06 THOUSANDS/ΜL (ref 0–0.1)
BASOPHILS NFR BLD AUTO: 1 % (ref 0–1)
BUN SERPL-MCNC: 20 MG/DL (ref 5–25)
CALCIUM SERPL-MCNC: 9.6 MG/DL (ref 8.3–10.1)
CHLORIDE SERPL-SCNC: 105 MMOL/L (ref 100–108)
CO2 SERPL-SCNC: 29 MMOL/L (ref 21–32)
CREAT SERPL-MCNC: 1.02 MG/DL (ref 0.6–1.3)
EOSINOPHIL # BLD AUTO: 0.34 THOUSAND/ΜL (ref 0–0.61)
EOSINOPHIL NFR BLD AUTO: 6 % (ref 0–6)
ERYTHROCYTE [DISTWIDTH] IN BLOOD BY AUTOMATED COUNT: 12.7 % (ref 11.6–15.1)
EST. AVERAGE GLUCOSE BLD GHB EST-MCNC: 128 MG/DL
GFR SERPL CREATININE-BSD FRML MDRD: 62 ML/MIN/1.73SQ M
GLUCOSE P FAST SERPL-MCNC: 119 MG/DL (ref 65–99)
HBA1C MFR BLD: 6.1 % (ref 4.2–6.3)
HCT VFR BLD AUTO: 44.2 % (ref 34.8–46.1)
HGB BLD-MCNC: 14.8 G/DL (ref 11.5–15.4)
IMM GRANULOCYTES # BLD AUTO: 0.03 THOUSAND/UL (ref 0–0.2)
IMM GRANULOCYTES NFR BLD AUTO: 1 % (ref 0–2)
LYMPHOCYTES # BLD AUTO: 2.11 THOUSANDS/ΜL (ref 0.6–4.47)
LYMPHOCYTES NFR BLD AUTO: 36 % (ref 14–44)
MCH RBC QN AUTO: 31 PG (ref 26.8–34.3)
MCHC RBC AUTO-ENTMCNC: 33.5 G/DL (ref 31.4–37.4)
MCV RBC AUTO: 93 FL (ref 82–98)
MONOCYTES # BLD AUTO: 0.5 THOUSAND/ΜL (ref 0.17–1.22)
MONOCYTES NFR BLD AUTO: 8 % (ref 4–12)
NEUTROPHILS # BLD AUTO: 2.89 THOUSANDS/ΜL (ref 1.85–7.62)
NEUTS SEG NFR BLD AUTO: 48 % (ref 43–75)
NRBC BLD AUTO-RTO: 0 /100 WBCS
PLATELET # BLD AUTO: 291 THOUSANDS/UL (ref 149–390)
PMV BLD AUTO: 10.5 FL (ref 8.9–12.7)
POTASSIUM SERPL-SCNC: 3.5 MMOL/L (ref 3.5–5.3)
RBC # BLD AUTO: 4.78 MILLION/UL (ref 3.81–5.12)
SODIUM SERPL-SCNC: 143 MMOL/L (ref 136–145)
WBC # BLD AUTO: 5.93 THOUSAND/UL (ref 4.31–10.16)

## 2019-08-02 PROCEDURE — 80048 BASIC METABOLIC PNL TOTAL CA: CPT

## 2019-08-02 PROCEDURE — 85025 COMPLETE CBC W/AUTO DIFF WBC: CPT

## 2019-08-02 PROCEDURE — 83036 HEMOGLOBIN GLYCOSYLATED A1C: CPT

## 2019-08-02 PROCEDURE — 36415 COLL VENOUS BLD VENIPUNCTURE: CPT

## 2019-08-05 ENCOUNTER — ANESTHESIA EVENT (OUTPATIENT)
Dept: PERIOP | Facility: HOSPITAL | Age: 59
End: 2019-08-05
Payer: COMMERCIAL

## 2019-08-08 ENCOUNTER — ANESTHESIA (OUTPATIENT)
Dept: PERIOP | Facility: HOSPITAL | Age: 59
End: 2019-08-08
Payer: COMMERCIAL

## 2019-08-08 ENCOUNTER — HOSPITAL ENCOUNTER (OUTPATIENT)
Facility: HOSPITAL | Age: 59
Setting detail: OUTPATIENT SURGERY
Discharge: HOME | End: 2019-08-08
Attending: ORTHOPAEDIC SURGERY | Admitting: ORTHOPAEDIC SURGERY
Payer: COMMERCIAL

## 2019-08-08 VITALS
WEIGHT: 194.13 LBS | DIASTOLIC BLOOD PRESSURE: 70 MMHG | OXYGEN SATURATION: 96 % | HEIGHT: 63 IN | BODY MASS INDEX: 34.4 KG/M2 | SYSTOLIC BLOOD PRESSURE: 120 MMHG | TEMPERATURE: 97.7 F | HEART RATE: 66 BPM | RESPIRATION RATE: 16 BRPM

## 2019-08-08 DIAGNOSIS — G56.02 CARPAL TUNNEL SYNDROME ON LEFT: Primary | ICD-10-CM

## 2019-08-08 LAB
GLUCOSE SERPL-MCNC: 94 MG/DL (ref 65–140)
GLUCOSE SERPL-MCNC: 97 MG/DL (ref 65–140)

## 2019-08-08 PROCEDURE — 82948 REAGENT STRIP/BLOOD GLUCOSE: CPT

## 2019-08-08 PROCEDURE — 99024 POSTOP FOLLOW-UP VISIT: CPT | Performed by: ORTHOPAEDIC SURGERY

## 2019-08-08 PROCEDURE — 29848 WRIST ENDOSCOPY/SURGERY: CPT | Performed by: ORTHOPAEDIC SURGERY

## 2019-08-08 RX ORDER — ACETAMINOPHEN 325 MG/1
650 TABLET ORAL ONCE
Status: COMPLETED | OUTPATIENT
Start: 2019-08-08 | End: 2019-08-08

## 2019-08-08 RX ORDER — FENTANYL CITRATE/PF 50 MCG/ML
25 SYRINGE (ML) INJECTION
Status: DISCONTINUED | OUTPATIENT
Start: 2019-08-08 | End: 2019-08-08 | Stop reason: HOSPADM

## 2019-08-08 RX ORDER — FENTANYL CITRATE 50 UG/ML
INJECTION, SOLUTION INTRAMUSCULAR; INTRAVENOUS AS NEEDED
Status: DISCONTINUED | OUTPATIENT
Start: 2019-08-08 | End: 2019-08-08 | Stop reason: SURG

## 2019-08-08 RX ORDER — HYDROCODONE BITARTRATE AND ACETAMINOPHEN 5; 325 MG/1; MG/1
1 TABLET ORAL EVERY 6 HOURS PRN
Qty: 5 TABLET | Refills: 0 | Status: SHIPPED | OUTPATIENT
Start: 2019-08-08 | End: 2019-08-13

## 2019-08-08 RX ORDER — CEFAZOLIN SODIUM 2 G/50ML
2000 SOLUTION INTRAVENOUS ONCE
Status: COMPLETED | OUTPATIENT
Start: 2019-08-08 | End: 2019-08-08

## 2019-08-08 RX ORDER — NAPROXEN SODIUM 220 MG
220 TABLET ORAL 2 TIMES DAILY WITH MEALS
Qty: 10 TABLET | Refills: 0 | Status: SHIPPED | OUTPATIENT
Start: 2019-08-08 | End: 2020-05-07

## 2019-08-08 RX ORDER — MIDAZOLAM HYDROCHLORIDE 1 MG/ML
INJECTION INTRAMUSCULAR; INTRAVENOUS AS NEEDED
Status: DISCONTINUED | OUTPATIENT
Start: 2019-08-08 | End: 2019-08-08 | Stop reason: SURG

## 2019-08-08 RX ORDER — SENNOSIDES 8.6 MG
650 CAPSULE ORAL EVERY 8 HOURS
Qty: 15 TABLET | Refills: 0 | Status: SHIPPED | OUTPATIENT
Start: 2019-08-08 | End: 2020-05-07

## 2019-08-08 RX ORDER — LIDOCAINE HYDROCHLORIDE AND EPINEPHRINE 10; 10 MG/ML; UG/ML
20 INJECTION, SOLUTION INFILTRATION; PERINEURAL ONCE
Status: COMPLETED | OUTPATIENT
Start: 2019-08-08 | End: 2019-08-08

## 2019-08-08 RX ORDER — ONDANSETRON 2 MG/ML
4 INJECTION INTRAMUSCULAR; INTRAVENOUS ONCE AS NEEDED
Status: DISCONTINUED | OUTPATIENT
Start: 2019-08-08 | End: 2019-08-08 | Stop reason: HOSPADM

## 2019-08-08 RX ORDER — PROPOFOL 10 MG/ML
INJECTION, EMULSION INTRAVENOUS AS NEEDED
Status: DISCONTINUED | OUTPATIENT
Start: 2019-08-08 | End: 2019-08-08 | Stop reason: SURG

## 2019-08-08 RX ORDER — SODIUM CHLORIDE, SODIUM LACTATE, POTASSIUM CHLORIDE, CALCIUM CHLORIDE 600; 310; 30; 20 MG/100ML; MG/100ML; MG/100ML; MG/100ML
75 INJECTION, SOLUTION INTRAVENOUS CONTINUOUS
Status: DISCONTINUED | OUTPATIENT
Start: 2019-08-08 | End: 2019-08-08 | Stop reason: HOSPADM

## 2019-08-08 RX ORDER — HYDROCODONE BITARTRATE AND ACETAMINOPHEN 5; 325 MG/1; MG/1
1 TABLET ORAL EVERY 6 HOURS PRN
Status: DISCONTINUED | OUTPATIENT
Start: 2019-08-08 | End: 2019-08-08 | Stop reason: HOSPADM

## 2019-08-08 RX ORDER — ONDANSETRON 2 MG/ML
INJECTION INTRAMUSCULAR; INTRAVENOUS AS NEEDED
Status: DISCONTINUED | OUTPATIENT
Start: 2019-08-08 | End: 2019-08-08 | Stop reason: SURG

## 2019-08-08 RX ORDER — MAGNESIUM HYDROXIDE 1200 MG/15ML
LIQUID ORAL AS NEEDED
Status: DISCONTINUED | OUTPATIENT
Start: 2019-08-08 | End: 2019-08-08 | Stop reason: HOSPADM

## 2019-08-08 RX ORDER — KETOROLAC TROMETHAMINE 30 MG/ML
INJECTION, SOLUTION INTRAMUSCULAR; INTRAVENOUS AS NEEDED
Status: DISCONTINUED | OUTPATIENT
Start: 2019-08-08 | End: 2019-08-08 | Stop reason: SURG

## 2019-08-08 RX ADMIN — PROPOFOL 20 MG: 10 INJECTION, EMULSION INTRAVENOUS at 11:46

## 2019-08-08 RX ADMIN — FENTANYL CITRATE 50 MCG: 50 INJECTION, SOLUTION INTRAMUSCULAR; INTRAVENOUS at 11:32

## 2019-08-08 RX ADMIN — FENTANYL CITRATE 25 MCG: 50 INJECTION, SOLUTION INTRAMUSCULAR; INTRAVENOUS at 12:11

## 2019-08-08 RX ADMIN — SODIUM CHLORIDE, SODIUM LACTATE, POTASSIUM CHLORIDE, AND CALCIUM CHLORIDE: .6; .31; .03; .02 INJECTION, SOLUTION INTRAVENOUS at 11:34

## 2019-08-08 RX ADMIN — FENTANYL CITRATE 25 MCG: 50 INJECTION, SOLUTION INTRAMUSCULAR; INTRAVENOUS at 11:46

## 2019-08-08 RX ADMIN — CEFAZOLIN SODIUM 2000 MG: 2 SOLUTION INTRAVENOUS at 11:34

## 2019-08-08 RX ADMIN — PROPOFOL 40 MG: 10 INJECTION, EMULSION INTRAVENOUS at 11:40

## 2019-08-08 RX ADMIN — SODIUM CHLORIDE, SODIUM LACTATE, POTASSIUM CHLORIDE, AND CALCIUM CHLORIDE 75 ML/HR: .6; .31; .03; .02 INJECTION, SOLUTION INTRAVENOUS at 11:10

## 2019-08-08 RX ADMIN — PROPOFOL 20 MG: 10 INJECTION, EMULSION INTRAVENOUS at 11:36

## 2019-08-08 RX ADMIN — MIDAZOLAM 2 MG: 1 INJECTION INTRAMUSCULAR; INTRAVENOUS at 11:32

## 2019-08-08 RX ADMIN — ONDANSETRON 4 MG: 2 INJECTION INTRAMUSCULAR; INTRAVENOUS at 11:49

## 2019-08-08 RX ADMIN — KETOROLAC TROMETHAMINE 30 MG: 30 INJECTION, SOLUTION INTRAMUSCULAR; INTRAVENOUS at 11:49

## 2019-08-08 RX ADMIN — ACETAMINOPHEN 650 MG: 325 TABLET, FILM COATED ORAL at 13:25

## 2019-08-08 NOTE — OP NOTE
OPERATIVE REPORT  PATIENT NAME: Devon Mata  :  1960  MRN: [id number]  Pt Location: [facility]    SURGERY DATE: 19    Surgeon(s) and Role:     * Rahel Sultana MD - Primary     * Crystal Goldstein - Assisting    Pre-Op Diagnosis:  Carpal tunnel syndrome on left [G56 02]    Post-Op Diagnosis Codes:     * Carpal tunnel syndrome on left [G56 02]    Procedure(s):  RELEASE CARPAL TUNNEL ENDOSCOPIC (Left)    Specimen(s):  * No orders in the log *    Estimated Blood Loss:   Minimal      Anesthesia Type:   IV Sedation with Anesthesia    Operative Indications: The patient has a history of Carpal Tunnel Syndrome  left that was recalcitrant to conservative management  The decision was made to bring the patient to the operating room for Endoscopic Carpal Tunnel Release  left  Risks of the procedure were explained which include, but are not limited to bleeding; infection; damage to nerves, arteries,veins, tendons; scar; pain; need for reoperation; failure to give desired result; and risks of anaesthesia  All questions were answered to satisfaction and they were willing to proceed  Operative Findings:  Left carpal tunnel    Complications:   None    Procedure and Technique:  After the patient, site, and procedure were identified, the patient was brought into the operating room in a supine position  Local anaesthesia and sedation were provided  A tourniquet was not used  The  left upper extremity was then prepped and drapped in a normal, sterile, orthopedic fashion  After reconfirmation of the patient, site, and surgical procedure, which was agreed upon by the entire surgical team, attention was turned to the left wrist   The sites of the proximal and distal incisions were marked    The wong of the proximal incision was placed horizontally at the midline of the wrist   The distal incision wong was longitudinal extending distally from the point of intersection of the line between the long finger and ring finger and the line along the distal border of the fully abducted thumb  The proximal incision was performed  Subcutaneous tissues were dissected  Then the transverse volar antebrachial fascia was perforated with a scalpel  The edges of the skin incision where retracted and the forearm fascia was incised for approximately 1 5 cm proximally with care taken to identify and protect the median nerve  Retractors were used to inspect the transverse carpal ligament distally  A curved Ghosh dissector was used to glide under the transverse carpal ligament and superficial to the median nerve with confirmation via the washboard feeling  Then the curved Ghosh was pushed into the palm toward the distal incision site  When the location of the distal skin wong was adequate, the distal incision was made  Then with retraction of the skin, further dissection and perforation of the palmar fascia was performed with the use of tenotomy scissors  The curved Ghosh was guided from proximal to distal out the distal incisions without any twisting to allow for dilation of the tract  The curved Ghosh was removed, and the cannula for the camera was inserted along the same tract, making sure to keep the alignment post on the cannula perpendicular to the plane of the hand without twisting  Then while keeping the wrist in extension, and holding the cannula of the camera in place, the wrist was placed on the hyperextension board  The scope was inserted distally, and a cotton-tip applicator was used proximally to clean the tract as well as the scope  A curved cutting knife was introduced from proximal to distal while keeping visualization with the use of the camera  Without twisting of the canula, the knife was used to cut the transverse carpal ligament completely, making sure there were no remnant fibers  Then after this was accomplished, the hand was removed from the extension block    Three maneuvers were used to confirm the full release of the transverse carpal ligament  First, the ease of twisting the trocar of the camera confirmed the release of the ligament  Second, the curved Ghosh was introduced to make sure there were no remnant fibers that could be felt palmarly  Third, the scope was introduced again to visualize that the whole ligament was released proximally to distally  Additional confirmation of full release included retraction and inspection in the distal and proximal incisions to make sure there were no remnant fibers distally or proximally respectively  At the completion of the procedure, hemostasis was obtained with cautery and direct pressure  The wounds were copiously irrigated with sterile solution  The wounds were closed with Prolene  Sterile dressings were applied, including Xeroform, gauze, tweeners, webril, ACE  Please note, all sponge, needle, and instrument counts were correct prior to closure  Loupe magnification was utilized  The patient tolerated the procedure well       I was present for the entire procedure    Patient Disposition:  PACU  and hemodynamically stable    SIGNATURE: Fozia Pelletier MD  DATE: 08/08/19  TIME: 12:03 PM

## 2019-08-08 NOTE — ANESTHESIA POSTPROCEDURE EVALUATION
Post-Op Assessment Note    CV Status:  Stable  Pain Score: 0    Pain management: adequate     Mental Status:  Alert   Hydration Status:  Stable   PONV Controlled:  None   Airway Patency:  Patent   Post Op Vitals Reviewed: Yes      Staff: Anesthesiologist, with CRNAs           BP      Temp      Pulse     Resp      SpO2

## 2019-08-08 NOTE — H&P
H&P Exam - Orthopedics   Rafael Grace 66 y o  female MRN: [id number]  Unit/Bed#: OR POOL    Assessment/Plan   Assessment:  L CTS  Plan:  L ECTR today    History of Present Illness   HPI:  Rafael Grace is a 66 y o  y o  female who presents with continued complaints of L carpal tunnel symptoms  Has tried bracing without relief  Historical Information  Review Of Systems:   · Skin: Normal  · Neuro: See HPI  · Musculoskeletal: See HPI  · 14 point review of systems negative except as stated above     Past Medical History:   Past Medical History:   Diagnosis Date    Anxiety     Asthma     Disease of thyroid gland     Hypertension     Psoriasis        Past Surgical History:   Past Surgical History:   Procedure Laterality Date     SECTION, LOW TRANSVERSE      COLONOSCOPY      colo 16 - polyp at sigmoid colon - 6 yr if adenoma or 10 yr if hyperplastic pathology: early hyperplastic changes     GALLBLADDER SURGERY      LAPAROSCOPY FOR ECTOPIC PREGNANCY      With excision    CO WRIST ARTHROSCOP,RELEASE Nevin Woodburn LIG Right 2019    Procedure: RELEASE CARPAL TUNNEL ENDOSCOPIC;  Surgeon: Arthur Montana MD;  Location: [facility];  Service: Orthopedics    THYROID SURGERY      WRIST SURGERY         Family History:  Family history reviewed and non-contributory  Family History   Problem Relation Age of Onset    Asthma Mother     Diabetes Father     Hypertension Father     Hypertension Family     Neuropathy Family     Stroke Family     Thyroid disease Family     No Known Problems Sister     No Known Problems Sister     No Known Problems Maternal Aunt     No Known Problems Maternal Aunt     No Known Problems Maternal Aunt     No Known Problems Maternal Aunt     No Known Problems Maternal Aunt     No Known Problems Paternal Aunt     No Known Problems Paternal Aunt        Social History:  Social History     Socioeconomic History    Marital status:       Spouse name: None    Number of children: 1    Years of education: None    Highest education level: None   Occupational History    None   Social Needs    Financial resource strain: None    Food insecurity:     Worry: None     Inability: None    Transportation needs:     Medical: None     Non-medical: None   Tobacco Use    Smoking status: Former Smoker     Packs/day: 0 25     Years: 30 00     Pack years: 7 50     Types: Cigarettes     Last attempt to quit: 3/4/2019     Years since quittin 4    Smokeless tobacco: Never Used   Substance and Sexual Activity    Alcohol use: Yes     Alcohol/week: 2 0 standard drinks     Types: 2 Cans of beer per week     Drinks per session: 1 or 2     Binge frequency: Less than monthly     Comment: Social drinker    Drug use: No    Sexual activity: Yes     Partners: Male     Birth control/protection: None   Lifestyle    Physical activity:     Days per week: None     Minutes per session: None    Stress: None   Relationships    Social connections:     Talks on phone: None     Gets together: None     Attends Christianity service: None     Active member of club or organization: None     Attends meetings of clubs or organizations: None     Relationship status: None    Intimate partner violence:     Fear of current or ex partner: None     Emotionally abused: None     Physically abused: None     Forced sexual activity: None   Other Topics Concern    None   Social History Narrative    Caffeine Use      Occupation:    Description: worked for [employer] for 19 years  Pnor back injury , treated With LESI and RPv'V  L[employer] Affiliation [employer] ([address])     Uses Safety Equipment - Seatbelts        Allergies: Allergies   Allergen Reactions    Methimazole Arthralgia     Reaction Date: ;  Annotation - [id number]: Joint pain           Labs:  0   Lab Value Date/Time    HCT 44 2 2019 1236    HCT 39 8 2019 1538    HCT 42 9 2019 0656    HCT 41 3 2019 0506    HGB 14 8 08/02/2019 1236    HGB 13 5 06/07/2019 1538    HGB 14 0 04/16/2019 0656    HGB 13 7 03/18/2019 0506    WBC 5 93 08/02/2019 1236    WBC 6 13 06/07/2019 1538    WBC 5 0 04/16/2019 0656    WBC 13 99 (H) 03/18/2019 0506    ESR 15 02/10/2016 1537    CRP <3 0 02/04/2016 1503       Meds:    Current Facility-Administered Medications:     ceFAZolin (ANCEF) IVPB (premix) 2,000 mg, 2,000 mg, Intravenous, Once, KWAN Altamirano    lactated ringers infusion, 75 mL/hr, Intravenous, Continuous, Maria Luz Dey MD    lidocaine-epinephrine (XYLOCAINE/EPINEPHRINE) 1 %-1:100,000 injection 20 mL, 20 mL, Infiltration, Once, Mariza Reardon MD    Blood Culture:   No results found for: BLOODCX    Wound Culture:   No results found for: WOUNDCULT    Ins and Outs:  No intake/output data recorded  Physical Exam  /76   Pulse 75   Temp 99 °F (37 2 °C) (Oral)   Resp 16   Ht 5' 3" (1 6 m)   Wt 88 1 kg (194 lb 2 oz)   SpO2 96%   BMI 34 39 kg/m²   /76   Pulse 75   Temp 99 °F (37 2 °C) (Oral)   Resp 16   Ht 5' 3" (1 6 m)   Wt 88 1 kg (194 lb 2 oz)   SpO2 96%   BMI 34 39 kg/m²   Gen: Alert and oriented to person, place, time  HEENT: EOMI, eyes clear, moist mucus membranes, hearing intact  Respiratory: Bilateral chest rise   No audible wheezing found  Cardiovascular: Regular Rate and Rhythm  Abdomen: soft nontender/nondistended  Ortho Exam: Well-maintained ROM of wrist and full composite fist  Neuro Exam: L carpal tunnel with + Tinel's and +APB weakness    Lab Results: Reviewed  Imaging: Reviewed

## 2019-08-08 NOTE — ANESTHESIA PREPROCEDURE EVALUATION
Review of Systems/Medical History  Patient summary reviewed  Chart reviewed  No history of anesthetic complications     Cardiovascular  Exercise tolerance (METS): >4,  Hyperlipidemia, Hypertension ,    Pulmonary  Smoker (quit 3/2019) , Asthma , well controlled/ stable Asthma type of rescue: PRN inhaler, No recent URI , No sleep apnea ,        GI/Hepatic  Negative GI/hepatic ROS          Negative  ROS        Endo/Other  Diabetes (BG 86) well controlled type 2 Oral agent, History of thyroid disease , hypothyroidism,      GYN  Negative gynecology ROS          Hematology  Negative hematology ROS      Musculoskeletal  Negative musculoskeletal ROS        Neurology  Negative neurology ROS      Psychology   Anxiety,            Physical Exam    Airway    Mallampati score: II  TM Distance: >3 FB  Neck ROM: full     Dental   No notable dental hx     Cardiovascular  Rhythm: regular, Rate: normal, Cardiovascular exam normal    Pulmonary  Pulmonary exam normal Breath sounds clear to auscultation,     Other Findings       Lab Results   Component Value Date    WBC 5 93 08/02/2019    HGB 14 8 08/02/2019     08/02/2019     Lab Results   Component Value Date    SODIUM 143 08/02/2019    K 3 5 08/02/2019    BUN 20 08/02/2019    CREATININE 1 02 08/02/2019     Lab Results   Component Value Date    HGBA1C 6 1 08/02/2019     Anesthesia Plan  ASA Score- 2     Anesthesia Type- IV sedation with anesthesia with ASA Monitors  Additional Monitors:   Airway Plan:         Plan Factors-    Induction- intravenous  Postoperative Plan-     Informed Consent- Anesthetic plan and risks discussed with patient (nephew)  I personally reviewed this patient with the CRNA  Discussed and agreed on the Anesthesia Plan with the CHANTAL Vazquez

## 2019-08-14 ENCOUNTER — TELEPHONE (OUTPATIENT)
Dept: OBGYN CLINIC | Facility: HOSPITAL | Age: 59
End: 2019-08-14

## 2019-08-14 NOTE — TELEPHONE ENCOUNTER
Looked at pictures, there is some swelling, No signs of infx  This is on the side which had ECTR a couple of months ago  She describes stiffness, clicking, locking and pain  Explained this sounds like a trigger finger but we'd have to see her to confirm  She has her post op appt for her recent hand surgery next week which we can address the trigger finger at the same time  In the meantime, can try heat, massage and gentle stretching

## 2019-08-23 ENCOUNTER — OFFICE VISIT (OUTPATIENT)
Dept: OBGYN CLINIC | Facility: HOSPITAL | Age: 59
End: 2019-08-23
Payer: COMMERCIAL

## 2019-08-23 ENCOUNTER — OFFICE VISIT (OUTPATIENT)
Dept: OCCUPATIONAL THERAPY | Facility: HOSPITAL | Age: 59
End: 2019-08-23
Attending: ORTHOPAEDIC SURGERY
Payer: COMMERCIAL

## 2019-08-23 VITALS
BODY MASS INDEX: 34.38 KG/M2 | DIASTOLIC BLOOD PRESSURE: 83 MMHG | HEART RATE: 89 BPM | WEIGHT: 194 LBS | SYSTOLIC BLOOD PRESSURE: 148 MMHG | HEIGHT: 63 IN

## 2019-08-23 DIAGNOSIS — M65.331 TRIGGER MIDDLE FINGER OF RIGHT HAND: Primary | ICD-10-CM

## 2019-08-23 DIAGNOSIS — G56.03 BILATERAL CARPAL TUNNEL SYNDROME: ICD-10-CM

## 2019-08-23 DIAGNOSIS — M65.331 TRIGGER MIDDLE FINGER OF RIGHT HAND: ICD-10-CM

## 2019-08-23 PROCEDURE — 99214 OFFICE O/P EST MOD 30 MIN: CPT | Performed by: ORTHOPAEDIC SURGERY

## 2019-08-23 PROCEDURE — A4467 BELT STRAP SLEEV GRMNT COVER: HCPCS | Performed by: ORTHOPAEDIC SURGERY

## 2019-08-23 PROCEDURE — L3933 FO W/O JOINTS CF: HCPCS | Performed by: OCCUPATIONAL THERAPIST

## 2019-08-23 NOTE — PROGRESS NOTES
Assessment:   S/p R ECTR on 19  S/p L ECTR on 19  Right long trigger finger    Plan:   The patient was given bilateral carpal tunnel gel sleeves today  She will also meet with our hand therapist for home exercise program and extension splint for her right long finger trigger finger  She understands that this is only about a 30% success rate  We did discuss possible steroid injection versus trigger finger release  Patient states she is not interested in steroid injection in that if the home exercise program and splinting does not provide her with relief she would like to proceed with surgical intervention we will re-evaluate in 6 weeks  Patient was given a work note returning her to work on 10/07/2019 without restrictions    Follow Up:  6  week(s)    To Do Next Visit:         CHIEF COMPLAINT:  Chief Complaint   Patient presents with    Left Hand - Post-op         SUBJECTIVE:  Elizabeth  is a 66 y o  female who presents for follow up after S/p R ECTR on 19  S/p L ECTR on 19  Today patient has Pain  Moderate  Intermittant  Sharp, Catching and Locking to right long finger  Patient states that her numbness and tingling into her bilateral hands has resolved since surgery  She is happy with these results         PHYSICAL EXAMINATION:  Vital signs: /83   Pulse 89   Ht 5' 3" (1 6 m)   Wt 88 kg (194 lb)   BMI 34 37 kg/m²   General: well developed and well nourished, alert, oriented times 3 and appears comfortable  Psychiatric: Normal    MUSCULOSKELETAL EXAMINATION:  Incision: Clean, dry, intact  Range of Motion: opposition intact and full composite fist possible  Neurovascular status: Neuro intact, good cap refill  Activity Restrictions: No restrictions  Done today: Sutures out and Steri strips applied      STUDIES REVIEWED:  No Studies to review      PROCEDURES PERFORMED:  Procedures  No Procedures performed today    Scribe Attestation    I,:   Mercedes Heath am acting as a scribe while in the presence of the attending physician :        I,:   Arthur Montana MD personally performed the services described in this documentation    as scribed in my presence :

## 2019-08-23 NOTE — LETTER
August 23, 2019     Patient: Miracle Beavers   YOB: 1960   Date of Visit: 8/23/2019       To Whom it May Concern:    Herminio Sutherland is under my professional care  She was seen in my office on 8/23/2019  She may return to work on 10/7/19 without restrictions  If you have any questions or concerns, please don't hesitate to call           Sincerely,          Fozia Pelletier MD        CC: No Recipients

## 2019-08-23 NOTE — PROGRESS NOTES
Orthosis    Diagnosis:   1  Trigger middle finger of right hand  Ambulatory referral to PT/OT hand therapy     Indication: Motion Blocking    Location: Right  long finger  Supplies: Custom Fit Orthotic  Orthosis type: MCP ext  Wearing Schedule: As Needed and and night   Describe Position:  MCP ext    Precautions: Universal (skin contact/breakdown)    Patient or Caregiver expresses understanding of wearing Schedule and Precautions? Yes  Patient or Caregiver able to don/doff orthotic independently? Yes    Written orders provided to patient?  Yes  Orders Obtained: Written  Orders Obtained from: Dr Nelly Morris      Return for evaluation and treatment No

## 2019-08-30 NOTE — UTILIZATION REVIEW
Initial Clinical Review    Admission: Date/Time/Statement: 3/15/19 @ 1703   Orders Placed This Encounter   Procedures    Inpatient Admission (expected length of stay for this patient Order details is greater than two midnights)     Standing Status:   Standing     Number of Occurrences:   1     Order Specific Question:   Admitting Physician     Answer:   Lyndsey Rosario [[id number]]     Order Specific Question:   Level of Care     Answer:   Med Surg [[id number]]     Order Specific Question:   Estimated length of stay     Answer:   More than 2 Midnights     Order Specific Question:   Certification     Answer:   I certify that inpatient services are medically necessary for this patient for a duration of greater than two midnights  See H&P and MD Progress Notes for additional information about the patient's course of treatment  ED: Date/Time/Mode of Arrival:   ED Arrival Information     Expected Arrival Acuity Means of Arrival Escorted By Service Admission Type    - 3/15/2019 14:48 Urgent [facility] Urgent    Arrival Complaint    cough, congestion        Chief Complaint:   Chief Complaint   Patient presents with    Shortness of Breath     Patient states she was diagnosed with pneumonia last week and followed up with PCP and placed on steroids and antibiotic  Patient states she is not feeling any better     Assessment/Plan: Acute exacerbation of asthma-unresponsive to outpatient treatment-IV steroids and monitor peak flows regularly  2  Hypertension-continue usual meds  3  Recent viral illness  4  Tobacco abuse-will place on nicotine patch and meds encouraged in cessation      66  Y O  female  Presents to  ED   From home  C/o increasing cough after feeling  Ill for past  2 weeks  +  Family  Sick contacts  She had an OP CXR  On  3/8 and was started om steroids  She  C/o  Ongoing wheezing  Despite  Heart nebs and  Came to ED  Started  With  Asthma  Symptoms  approx  3  Years  Ago      Patient currently having heart now have been using accessory muscles for breathing     Pulmonary/Chest: No stridor  No respiratory distress  She has wheezes  Scattered rhonchi in the upper airway         ED Vital Signs:   ED Triage Vitals   Temperature Pulse Respirations Blood Pressure SpO2   03/15/19 1510 03/15/19 1510 03/15/19 1510 03/15/19 1510 03/15/19 1510   (!) 97 1 °F (36 2 °C) 80 (!) 24 168/88 96 %      Temp Source Heart Rate Source Patient Position - Orthostatic VS BP Location FiO2 (%)   03/15/19 2017 03/15/19 1759 03/15/19 1759 03/15/19 1759 --   Oral Monitor Lying Right arm       Pain Score       03/15/19 1510       No Pain          Wt Readings from Last 1 Encounters:   08/23/19 88 kg (194 lb)     Vital Signs (abnormal):     above    Pertinent Labs/Diagnostic Test Results:    WBC   14 36  Abs neutro    9 41  CXR:     No focal consolidation, pleural effusion, or pneumothorax      ED Treatment:   Medication Administration from 03/15/2019 1448 to 03/15/2019 2008       Date/Time Order Dose Route Action Action by Comments     03/15/2019 1715 sodium chloride 0 9 % bolus 1,000 mL 0 mL Intravenous Stopped Zev Olmos RN      03/15/2019 1528 sodium chloride 0 9 % bolus 1,000 mL 1,000 mL Intravenous [address] Zev Olmos, [address] [place] [address]      03/15/2019 1528 ipratropium-albuterol (DUO-NEB) 0 5-2 5 mg/3 mL inhalation solution 3 mL 3 mL Nebulization Given Zev Olmos RN      03/15/2019 1528 methylPREDNISolone sodium succinate (Solu-MEDROL) injection 40 mg 40 mg Intravenous Given Zev Olmos RN      03/15/2019 1552 magnesium sulfate 2 g/50 mL IVPB (premix) 2 g 0 g Intravenous Stopped Zev Olmos RN      03/15/2019 1528 magnesium sulfate 2 g/50 mL IVPB (premix) 2 g 2 g Intravenous [address] Zev Olmos RN      03/15/2019 1616 albuterol inhalation solution 10 mg 10 mg Nebulization Given Zev Olmos RN      03/15/2019 1618 ipratropium (ATROVENT) 0 02 % inhalation solution 1 mg 1 mg Nebulization Given Alexandre Nichols Rik Ellsworth RN      03/15/2019 1618 sodium chloride 0 9 % inhalation solution 12 mL 12 mL Nebulization Given Frederic Gresham RN         Past Medical/Surgical History:     Admitting Diagnosis: Cough [R05]  Asthma exacerbation [J45 901]     Age/Sex: 64 y o  female     Admission Orders:    IP  3/15  @  1704  Scheduled Meds:   Continuous Infusions:   No current facility-administered medications for this encounter  PRN Meds:      CCD diet  PF  Pre and post  Treatment  [facility]  Pulmonary      Network Utilization Review Department  Phone: [phone]; Fax [phone]  [email or web address]  ATTENTION: Please call with any questions or concerns to [phone]  and carefully listen to the prompts so that you are directed to the right person  Send all requests for admission clinical reviews, approved or denied determinations and any other requests to fax [phone]  All voicemails are confidential         Tuan Pedlar:   [id number]    Notification of Discharge  This is a Notification of Discharge from our facility [address]  Please be advised that this patient has been discharge from our facility  Below you will find the admission and discharge date and time including the patients disposition  PRESENTATION DATE: 3/15/2019  3:01 PM  IP ADMISSION DATE: 3/15/19 1703  DISCHARGE DATE: 3/18/2019  3:32 PM  DISPOSITION: [address] Utilization Review Department  Phone: [phone]; Fax [phone]  [email or web address]  ATTENTION: Please call with any questions or concerns to [phone]  and carefully listen to the prompts so that you are directed to the right person  Send all requests for admission clinical reviews, approved or denied determinations and any other requests to fax [phone]   All voicemails are confidential

## 2019-09-17 ENCOUNTER — TELEPHONE (OUTPATIENT)
Dept: OBGYN CLINIC | Facility: HOSPITAL | Age: 59
End: 2019-09-17

## 2019-09-17 NOTE — TELEPHONE ENCOUNTER
Patient had surgery on both hands with Dr Roddy Billings  She is receiving bills for her post op appointments for suture removal  She already spoke with the billing department and they stated that the office sent it over as an office visit and not a post op  She is asking what to do?

## 2019-10-21 ENCOUNTER — OFFICE VISIT (OUTPATIENT)
Dept: OBGYN CLINIC | Facility: CLINIC | Age: 59
End: 2019-10-21

## 2019-10-21 VITALS
DIASTOLIC BLOOD PRESSURE: 85 MMHG | HEIGHT: 63 IN | WEIGHT: 198 LBS | SYSTOLIC BLOOD PRESSURE: 121 MMHG | HEART RATE: 96 BPM | BODY MASS INDEX: 35.08 KG/M2

## 2019-10-21 DIAGNOSIS — M65.331 TRIGGER MIDDLE FINGER OF RIGHT HAND: ICD-10-CM

## 2019-10-21 DIAGNOSIS — M62.40 INTRINSIC MUSCLE TIGHTNESS: Primary | ICD-10-CM

## 2019-10-21 PROCEDURE — 99024 POSTOP FOLLOW-UP VISIT: CPT | Performed by: ORTHOPAEDIC SURGERY

## 2019-11-04 ENCOUNTER — OFFICE VISIT (OUTPATIENT)
Dept: OBGYN CLINIC | Facility: CLINIC | Age: 59
End: 2019-11-04

## 2019-11-04 ENCOUNTER — APPOINTMENT (OUTPATIENT)
Dept: RADIOLOGY | Facility: CLINIC | Age: 59
End: 2019-11-04
Payer: COMMERCIAL

## 2019-11-04 VITALS
DIASTOLIC BLOOD PRESSURE: 78 MMHG | WEIGHT: 199 LBS | HEIGHT: 63 IN | SYSTOLIC BLOOD PRESSURE: 118 MMHG | BODY MASS INDEX: 35.26 KG/M2

## 2019-11-04 DIAGNOSIS — R52 PAIN: ICD-10-CM

## 2019-11-04 DIAGNOSIS — M62.40 INTRINSIC MUSCLE TIGHTNESS: Primary | ICD-10-CM

## 2019-11-04 PROBLEM — G56.02 CARPAL TUNNEL SYNDROME ON LEFT: Status: RESOLVED | Noted: 2019-07-12 | Resolved: 2019-11-04

## 2019-11-04 PROBLEM — G56.03 BILATERAL CARPAL TUNNEL SYNDROME: Status: RESOLVED | Noted: 2019-06-04 | Resolved: 2019-11-04

## 2019-11-04 PROCEDURE — 73140 X-RAY EXAM OF FINGER(S): CPT

## 2019-11-04 PROCEDURE — 99024 POSTOP FOLLOW-UP VISIT: CPT | Performed by: ORTHOPAEDIC SURGERY

## 2019-11-04 NOTE — PROGRESS NOTES
ASSESSMENT/PLAN:    Assessment:   R hand intrinsic tightness    Plan:   The reasoning for therapy for this condition d/w pt in detail today  Again, reiterated that therapy will likely get rid of her pain from the muscle tightness  Pt states understanding    Follow Up:  2  month(s)      _____________________________________________________  CHIEF COMPLAINT:  Chief Complaint   Patient presents with    Right Middle Finger - Follow-up         SUBJECTIVE:  Ava Zavala is a 64 y o  female who presents for follow up regarding her R long finger  She states she continues to have pain and swelling  When asked about therapy, patient states that she did not think she had to go since she uses her hand at work every day  She is going to [facility] in a few weeks and is looking for relief for this      PAST MEDICAL HISTORY:  Past Medical History:   Diagnosis Date    Anxiety     Asthma     Disease of thyroid gland     Hypertension     Psoriasis        PAST SURGICAL HISTORY:  Past Surgical History:   Procedure Laterality Date     SECTION, LOW TRANSVERSE      COLONOSCOPY      colo 16 - polyp at sigmoid colon - 6 yr if adenoma or 10 yr if hyperplastic pathology: early hyperplastic changes     GALLBLADDER SURGERY      LAPAROSCOPY FOR ECTOPIC PREGNANCY      With excision    DC WRIST Kenyon Myles LIG Right 2019    Procedure: RELEASE CARPAL TUNNEL ENDOSCOPIC;  Surgeon: Krish Tran MD;  [facility];  Service: Orthopedics    DC WRIST Kenyonshane Cardozaon LIG Left 2019    Procedure: RELEASE CARPAL TUNNEL ENDOSCOPIC;  Surgeon: Krish Tran MD;  Location[facility];  Service: Orthopedics    THYROID SURGERY      WRIST SURGERY         FAMILY HISTORY:  Family History   Problem Relation Age of Onset    Asthma Mother     Diabetes Father     Hypertension Father     Hypertension Family     Neuropathy Family     Stroke Family     Thyroid disease Family     No Known Problems Sister     No Known Problems Sister     No Known Problems Maternal Aunt     No Known Problems Maternal Aunt     No Known Problems Maternal Aunt     No Known Problems Maternal Aunt     No Known Problems Maternal Aunt     No Known Problems Paternal Aunt     No Known Problems Paternal Aunt        SOCIAL HISTORY:  Social History     Tobacco Use    Smoking status: Current Every Day Smoker     Packs/day: 0 25     Years: 30 00     Pack years: 7 50     Types: Cigarettes    Smokeless tobacco: Never Used   Substance Use Topics    Alcohol use:  Yes     Alcohol/week: 2 0 standard drinks     Types: 2 Cans of beer per week     Drinks per session: 1 or 2     Binge frequency: Less than monthly     Comment: Social drinker    Drug use: No       MEDICATIONS:    Current Outpatient Medications:     acetaminophen (TYLENOL 8 HOUR) 650 mg CR tablet, Take 1 tablet (650 mg total) by mouth every 8 (eight) hours, Disp: 15 tablet, Rfl: 0    albuterol (PROVENTIL HFA,VENTOLIN HFA) 90 mcg/act inhaler, Inhale 2 puffs every 4 (four) hours as needed for wheezing or shortness of breath, Disp: 3 Inhaler, Rfl: 3    Brodalumab (SILIQ SC), Inject 1 Syringe under the skin once a week, Disp: , Rfl:     cholecalciferol (VITAMIN D3) 1,000 units tablet, Take 1 tablet by mouth daily, Disp: , Rfl:     fluticasone-salmeterol (AIRDUO RESPICLICK) 55-02 mcg/act dry powder inhaler, INHALE 1 PUFF TWICE DAILY, Disp: 1 each, Rfl: 1    levothyroxine 200 mcg tablet, 1 tab PO q day EXCEPT 1/2 tab once weekly, Disp: 90 tablet, Rfl: 2    lisinopril (ZESTRIL) 20 mg tablet, Take 1 tablet (20 mg total) by mouth daily, Disp: 90 tablet, Rfl: 2    metFORMIN (GLUCOPHAGE-XR) 500 mg 24 hr tablet, Take 3 tablets (1,500 mg total) by mouth daily with breakfast, Disp: 90 tablet, Rfl: 2    fenofibrate micronized (LOFIBRA) 200 MG capsule, Take 1 capsule (200 mg total) by mouth daily for 124 days, Disp: 90 capsule, Rfl: 1    naproxen sodium (ALEVE) 220 MG tablet, Take 1 tablet (220 mg total) by mouth 2 (two) times a day with meals for 5 days, Disp: 10 tablet, Rfl: 0    ALLERGIES:  Allergies   Allergen Reactions    Methimazole Arthralgia     Reaction Date: [id number]; Annotation - [id number]: Joint pain       REVIEW OF SYSTEMS:  Pertinent items are noted in HPI  A comprehensive review of systems was negative  LABS:  HgA1c:   Lab Results   Component Value Date    HGBA1C 6 1 08/02/2019     BMP:   Lab Results   Component Value Date    CALCIUM 9 6 08/02/2019    K 3 5 08/02/2019    CO2 29 08/02/2019     08/02/2019    BUN 20 08/02/2019    CREATININE 1 02 08/02/2019           _____________________________________________________  PHYSICAL EXAMINATION:  Vital signs: /78   Ht 5' 3" (1 6 m)   Wt 90 3 kg (199 lb)   BMI 35 25 kg/m²   General: well developed and well nourished, alert, oriented times 3 and appears comfortable  Psychiatric: Normal  HEENT: Trachea Midline, No torticollis  Cardiovascular: No discernable arrhythmia  Pulmonary: No wheezing or stridor  Skin: No masses, erythema, lacerations, fluctation, ulcerations  Neurovascular: Sensation Intact to the Median, Ulnar, Radial Nerve, Motor Intact to the Median, Ulnar, Radial Nerve and Pulses Intact    MUSCULOSKELETAL EXAMINATION:  RIGHT SIDE:  Finger:  + edema of the finger  No ttp A1 pulley and no triggering  Pt with + Walt testing long > index/ring   TTP along PIP joint     _____________________________________________________  STUDIES REVIEWED:  Images were reviewd in PACS: Xrays taken today show no acute injuries to the long finger, very minimal arthritic changes      PROCEDURES PERFORMED:  Procedures  No Procedures performed today   Scribe Attestation    I,:   Thu Mcgee PA-C am acting as a scribe while in the presence of the attending physician :        I,:   Tracie Valle MD personally performed the services described in this documentation    as scribed in my presence :

## 2019-11-06 LAB
EST. AVERAGE GLUCOSE BLD GHB EST-MCNC: 123 MG/DL
GLUCOSE SERPL-MCNC: 84 MG/DL (ref 65–99)
HBA1C MFR BLD: 5.9 % (ref 4.8–5.6)
T4 FREE SERPL-MCNC: 1.45 NG/DL (ref 0.82–1.77)
TSH SERPL DL<=0.005 MIU/L-ACNC: 3.88 UIU/ML (ref 0.45–4.5)

## 2019-11-07 ENCOUNTER — OFFICE VISIT (OUTPATIENT)
Dept: FAMILY MEDICINE CLINIC | Facility: HOSPITAL | Age: 59
End: 2019-11-07
Payer: COMMERCIAL

## 2019-11-07 VITALS
WEIGHT: 196.2 LBS | BODY MASS INDEX: 34.76 KG/M2 | DIASTOLIC BLOOD PRESSURE: 82 MMHG | HEART RATE: 76 BPM | HEIGHT: 63 IN | TEMPERATURE: 97.4 F | SYSTOLIC BLOOD PRESSURE: 128 MMHG

## 2019-11-07 DIAGNOSIS — E11.9 TYPE 2 DIABETES MELLITUS WITHOUT COMPLICATION, WITHOUT LONG-TERM CURRENT USE OF INSULIN (HCC): ICD-10-CM

## 2019-11-07 DIAGNOSIS — E03.8 OTHER SPECIFIED HYPOTHYROIDISM: ICD-10-CM

## 2019-11-07 DIAGNOSIS — L40.9 PSORIASIS: ICD-10-CM

## 2019-11-07 DIAGNOSIS — E78.1 ESSENTIAL HYPERTRIGLYCERIDEMIA: ICD-10-CM

## 2019-11-07 DIAGNOSIS — E55.9 HYPOVITAMINOSIS D: ICD-10-CM

## 2019-11-07 DIAGNOSIS — E11.9 CONTROLLED TYPE 2 DIABETES MELLITUS WITHOUT COMPLICATION, WITHOUT LONG-TERM CURRENT USE OF INSULIN (HCC): Primary | ICD-10-CM

## 2019-11-07 DIAGNOSIS — I10 ESSENTIAL HYPERTENSION: ICD-10-CM

## 2019-11-07 PROCEDURE — 3079F DIAST BP 80-89 MM HG: CPT | Performed by: INTERNAL MEDICINE

## 2019-11-07 PROCEDURE — 3074F SYST BP LT 130 MM HG: CPT | Performed by: INTERNAL MEDICINE

## 2019-11-07 PROCEDURE — 99214 OFFICE O/P EST MOD 30 MIN: CPT | Performed by: INTERNAL MEDICINE

## 2019-11-07 RX ORDER — METFORMIN HYDROCHLORIDE 500 MG/1
1000 TABLET, EXTENDED RELEASE ORAL
Qty: 180 TABLET | Refills: 1 | Status: SHIPPED | OUTPATIENT
Start: 2019-11-07 | End: 2020-05-06

## 2019-11-07 RX ORDER — GUSELKUMAB 100 MG/ML
INJECTION SUBCUTANEOUS
Refills: 0 | COMMUNITY
Start: 2019-11-04 | End: 2020-05-20 | Stop reason: ALTCHOICE

## 2019-11-07 NOTE — ASSESSMENT & PLAN NOTE
Just switched from [address] to [address] - no SE noted, con't meds and f/u as per Catherine Fess

## 2019-11-07 NOTE — ASSESSMENT & PLAN NOTE
Lab Results   Component Value Date    HGBA1C 5 9 (H) 11/05/2019   DM type 2 without complications - controlled with A1c 5 9- decrease Metformin to 500 mg 2 tab in am, urged healthy diet/exercise/wgt loss, recheck BW in 6 mo - order given, on Ace but no statin, foot exam done today, will obtain copy of eye exam from Dr Samina Johansen that pt states she had over the summer

## 2019-11-07 NOTE — PROGRESS NOTES
Assessment/Plan:    Controlled type 2 diabetes mellitus without complication, without long-term current use of insulin ([facility])    Lab Results   Component Value Date    HGBA1C 5 9 (H) 11/05/2019   DM type 2 without complications - controlled with A1c 5 9- decrease Metformin to 500 mg 2 tab in am, urged healthy diet/exercise/wgt loss, recheck BW in 6 mo - order given, on Ace but no statin, foot exam done today, will obtain copy of eye exam from Dr Rose Marie Wilson that pt states she had over the summer    Other specified hypothyroidism  Pt clinically euthyroid and TFT's at goal, con't current levothyroxine, recheck TFT's annually    Essential hypertension  BP at goal by end of appt - con't current meds    Psoriasis  Just switched from [address] to [address] - no SE noted, con't meds and f/u as per Derm    Essential hypertriglyceridemia  FLP due in the spring - order given, con't current fibrate for now, diet/exercise/wgt loss encouraged    Hypovitaminosis D  Con't current supplement - due for Vit D levels in the spring - order given       Diagnoses and all orders for this visit:    Controlled type 2 diabetes mellitus without complication, without long-term current use of insulin ([facility] [address] )  -     CBC and differential; Future  -     Comprehensive metabolic panel; Future  -     Hemoglobin A1C; Future  -     Lipid panel; Future  -     Microalbumin / creatinine urine ratio; Future  -     CBC and differential  -     Comprehensive metabolic panel  -     Hemoglobin A1C  -     Lipid panel  -     Microalbumin / creatinine urine ratio    Other specified hypothyroidism  -     CBC and differential; Future  -     Comprehensive metabolic panel; Future  -     Hemoglobin A1C; Future  -     Lipid panel; Future  -     Microalbumin / creatinine urine ratio;  Future  -     CBC and differential  -     Comprehensive metabolic panel  -     Hemoglobin A1C  -     Lipid panel  -     Microalbumin / creatinine urine ratio    Essential hypertension  -     Comprehensive metabolic panel; Future  -     Comprehensive metabolic panel    Type 2 diabetes mellitus without complication, without long-term current use of insulin (HCC)  -     metFORMIN (GLUCOPHAGE-XR) 500 mg 24 hr tablet; Take 2 tablets (1,000 mg total) by mouth daily with breakfast    Essential hypertriglyceridemia  -     Lipid panel; Future  -     Lipid panel    Hypovitaminosis D  -     Vitamin D 25 hydroxy; Future  -     Vitamin D 25 hydroxy    Psoriasis    Other orders  -     TREMFYA 100 MG/ML SOSY; INJECT 100MG UNDER THE SKIN AT WEEK 0      Colonoscopy - 9/14 - 5 yrs - urged to call for appt    Mammo 5/19    PAP 11/18 - with Dr Edgar Castro      Going to [place] to be with parents for 3 weeks for Thanksgiving      Subjective:      Patient ID: Jessa Pemberton is a 64 y o  female  HPI Pt here for follow up appt and BW results    BW reults were d/w pt in detail: FBS/A1C 84/5 9, TSH/FT4 both wnl  Def of controlled vs uncontrolled DM was reviewed  Diet was reviewed - wgt up 1 lb from May 2019  She is taking her DM meds as directed  She is due for a foot and eye exam   She states she had an eye exam earlier this summer  She notes no numbness/tingling but she has some discomfort at the top of the foot at the end of day  She denies sore/ulcers  She is on an ACE but no statin  BP at goal today and meds were reviewed and no changes have occurred  She denies missing doses of meds or SE with the meds  She does not check her BP outside the office  She notes no frequent Ha's/dizziness/double vision/CP  Pt is taking her Levothyroxine daily as directed  She is taking medication w/o SE  She does admit to rarely missing doses on the weekend  She notes no excessive fatigue/hair loss/skin changes/D/C/tremor/palp  BP above goal today and meds were reviewed and no changes have occurred  She denies missing doses of meds or SE with the meds    She does not check her BP outside the office but states when she saw Ortho recently it was actually low  She notes no frequent Ha's/dizziness/double vision/CP  BP better by end of appt  Pt had her Siliq switched to Tremfya about 2 mos ago  She has had no SE with the medication and her psoriasis is pretty well controlled  Colonoscopy - 9/14 - 5 yrs    Mammo 5/19    PAP 11/18 - with Dr Basilia Mason        Review of Systems   Constitutional: Negative for chills, fatigue, fever and unexpected weight change  HENT: Negative for congestion and sore throat  Eyes: Negative for pain and visual disturbance  Respiratory: Negative for cough, shortness of breath and wheezing  Cardiovascular: Negative for chest pain, palpitations and leg swelling  Gastrointestinal: Negative for abdominal pain, blood in stool, constipation, diarrhea, nausea and vomiting  Endocrine: Negative for polydipsia and polyuria  Genitourinary: Negative for difficulty urinating and dysuria  Musculoskeletal: Positive for arthralgias  Negative for back pain and neck pain  Skin: Negative for rash and wound  Neurological: Negative for dizziness, light-headedness and headaches  Hematological: Negative for adenopathy  Psychiatric/Behavioral: Negative for behavioral problems and confusion  Objective:    /82   Pulse 76   Temp (!) 97 4 °F (36 3 °C)   Ht 5' 3" (1 6 m)   Wt 89 kg (196 lb 3 2 oz)   BMI 34 76 kg/m²      Physical Exam   Constitutional: She appears well-developed and well-nourished  No distress  HENT:   Head: Normocephalic and atraumatic  Eyes: Conjunctivae are normal  Right eye exhibits no discharge  Left eye exhibits no discharge  Neck: Neck supple  No tracheal deviation present  Cardiovascular: Normal rate, regular rhythm and normal heart sounds  Exam reveals no friction rub  Pulses are no weak pulses  No murmur heard  Pulses:       Dorsalis pedis pulses are 2+ on the right side, and 2+ on the left side     Pulmonary/Chest: Effort normal and breath sounds normal  No respiratory distress  She has no wheezes  She has no rales  Abdominal: Soft  She exhibits no distension  There is no tenderness  There is no guarding  Musculoskeletal: She exhibits no edema  Feet:   Right Foot:   Skin Integrity: Positive for callus and dry skin  Negative for ulcer, skin breakdown, erythema or warmth  Left Foot:   Skin Integrity: Positive for callus and dry skin  Negative for ulcer, skin breakdown, erythema or warmth  Neurological: She is alert  She exhibits normal muscle tone  Skin: Skin is warm and dry  No rash noted  Psychiatric: She has a normal mood and affect  Her behavior is normal    Nursing note and vitals reviewed  Patient's shoes and socks removed  Right Foot/Ankle   Right Foot Inspection  Skin Exam: skin normal, skin intact, dry skin, callus and callus no warmth, no erythema, no maceration, no abnormal color, no pre-ulcer and no ulcer                          Toe Exam: ROM and strength within normal limits  Sensory   Vibration: intact    Monofilament testing: intact  Vascular    The right DP pulse is 2+  Left Foot/Ankle  Left Foot Inspection  Skin Exam: skin normal, skin intact, dry skin and callusno warmth, no erythema, no maceration, normal color, no pre-ulcer and no ulcer                         Toe Exam: ROM and strength within normal limits                   Sensory   Vibration: intact    Monofilament: intact  Vascular    The left DP pulse is 2+  Assign Risk Category:  No deformity present; No loss of protective sensation;  No weak pulses       Risk: 0

## 2019-11-07 NOTE — ASSESSMENT & PLAN NOTE
FLP due in the spring - order given, con't current fibrate for now, diet/exercise/wgt loss encouraged

## 2020-04-27 ENCOUNTER — TELEPHONE (OUTPATIENT)
Dept: FAMILY MEDICINE CLINIC | Facility: HOSPITAL | Age: 60
End: 2020-04-27

## 2020-04-27 ENCOUNTER — TRANSCRIBE ORDERS (OUTPATIENT)
Dept: ADMINISTRATIVE | Facility: HOSPITAL | Age: 60
End: 2020-04-27

## 2020-04-27 DIAGNOSIS — Z12.31 SCREENING MAMMOGRAM FOR HIGH-RISK PATIENT: Primary | ICD-10-CM

## 2020-04-27 DIAGNOSIS — Z12.31 ENCOUNTER FOR SCREENING MAMMOGRAM FOR MALIGNANT NEOPLASM OF BREAST: Primary | ICD-10-CM

## 2020-05-06 DIAGNOSIS — E11.9 TYPE 2 DIABETES MELLITUS WITHOUT COMPLICATION, WITHOUT LONG-TERM CURRENT USE OF INSULIN (HCC): ICD-10-CM

## 2020-05-06 RX ORDER — METFORMIN HYDROCHLORIDE 500 MG/1
TABLET, EXTENDED RELEASE ORAL
Qty: 90 TABLET | Refills: 0 | Status: SHIPPED | OUTPATIENT
Start: 2020-05-06 | End: 2020-05-07

## 2020-05-07 ENCOUNTER — TELEMEDICINE (OUTPATIENT)
Dept: FAMILY MEDICINE CLINIC | Facility: HOSPITAL | Age: 60
End: 2020-05-07
Payer: COMMERCIAL

## 2020-05-07 ENCOUNTER — TELEPHONE (OUTPATIENT)
Dept: FAMILY MEDICINE CLINIC | Facility: HOSPITAL | Age: 60
End: 2020-05-07

## 2020-05-07 ENCOUNTER — TELEPHONE (OUTPATIENT)
Dept: ADMINISTRATIVE | Facility: OTHER | Age: 60
End: 2020-05-07

## 2020-05-07 VITALS — WEIGHT: 197 LBS | BODY MASS INDEX: 34.91 KG/M2 | HEIGHT: 63 IN

## 2020-05-07 DIAGNOSIS — E11.9 TYPE 2 DIABETES MELLITUS WITHOUT COMPLICATION, WITHOUT LONG-TERM CURRENT USE OF INSULIN (HCC): ICD-10-CM

## 2020-05-07 DIAGNOSIS — N95.0 POSTMENOPAUSAL BLEEDING: ICD-10-CM

## 2020-05-07 DIAGNOSIS — E11.9 CONTROLLED TYPE 2 DIABETES MELLITUS WITHOUT COMPLICATION, WITHOUT LONG-TERM CURRENT USE OF INSULIN (HCC): Primary | ICD-10-CM

## 2020-05-07 DIAGNOSIS — E66.09 CLASS 1 OBESITY DUE TO EXCESS CALORIES WITHOUT SERIOUS COMORBIDITY WITH BODY MASS INDEX (BMI) OF 34.0 TO 34.9 IN ADULT: ICD-10-CM

## 2020-05-07 DIAGNOSIS — F41.9 ANXIETY: ICD-10-CM

## 2020-05-07 PROCEDURE — 99214 OFFICE O/P EST MOD 30 MIN: CPT | Performed by: INTERNAL MEDICINE

## 2020-05-07 RX ORDER — ESCITALOPRAM OXALATE 10 MG/1
10 TABLET ORAL DAILY
Qty: 30 TABLET | Refills: 1 | Status: SHIPPED | OUTPATIENT
Start: 2020-05-07 | End: 2021-01-26

## 2020-05-07 RX ORDER — METFORMIN HYDROCHLORIDE 500 MG/1
TABLET, EXTENDED RELEASE ORAL
Qty: 180 TABLET | Refills: 1 | Status: SHIPPED | OUTPATIENT
Start: 2020-05-07 | End: 2020-11-24

## 2020-05-08 ENCOUNTER — TELEPHONE (OUTPATIENT)
Dept: OBGYN CLINIC | Facility: CLINIC | Age: 60
End: 2020-05-08

## 2020-05-08 DIAGNOSIS — N95.0 POSTMENOPAUSAL BLEEDING: Primary | ICD-10-CM

## 2020-05-11 ENCOUNTER — HOSPITAL ENCOUNTER (OUTPATIENT)
Dept: ULTRASOUND IMAGING | Facility: HOSPITAL | Age: 60
Discharge: HOME | End: 2020-05-11
Attending: OBSTETRICS & GYNECOLOGY
Payer: COMMERCIAL

## 2020-05-11 DIAGNOSIS — N95.0 POSTMENOPAUSAL BLEEDING: ICD-10-CM

## 2020-05-11 PROCEDURE — 76856 US EXAM PELVIC COMPLETE: CPT

## 2020-05-11 PROCEDURE — 76830 TRANSVAGINAL US NON-OB: CPT

## 2020-05-13 ENCOUNTER — OFFICE VISIT (OUTPATIENT)
Dept: OBGYN CLINIC | Facility: CLINIC | Age: 60
End: 2020-05-13
Payer: COMMERCIAL

## 2020-05-13 VITALS
HEIGHT: 62 IN | DIASTOLIC BLOOD PRESSURE: 74 MMHG | SYSTOLIC BLOOD PRESSURE: 130 MMHG | WEIGHT: 199.9 LBS | BODY MASS INDEX: 36.78 KG/M2

## 2020-05-13 DIAGNOSIS — N95.0 PMB (POSTMENOPAUSAL BLEEDING): Primary | ICD-10-CM

## 2020-05-13 LAB
ALBUMIN SERPL-MCNC: 4.4 G/DL (ref 3.8–4.9)
ALBUMIN/CREAT UR: 29 MG/G CREAT (ref 0–29)
ALBUMIN/GLOB SERPL: 1.3 {RATIO} (ref 1.2–2.2)
ALP SERPL-CCNC: 82 IU/L (ref 39–117)
ALT SERPL-CCNC: 34 IU/L (ref 0–32)
AST SERPL-CCNC: 34 IU/L (ref 0–40)
BASOPHILS # BLD AUTO: 0.1 X10E3/UL (ref 0–0.2)
BASOPHILS NFR BLD AUTO: 1 %
BILIRUB SERPL-MCNC: 0.3 MG/DL (ref 0–1.2)
BUN SERPL-MCNC: 14 MG/DL (ref 6–24)
BUN/CREAT SERPL: 18 (ref 9–23)
CALCIUM SERPL-MCNC: 9.5 MG/DL (ref 8.7–10.2)
CHLORIDE SERPL-SCNC: 104 MMOL/L (ref 96–106)
CHOLEST SERPL-MCNC: 175 MG/DL (ref 100–199)
CHOLEST/HDLC SERPL: 4.7 RATIO (ref 0–4.4)
CO2 SERPL-SCNC: 22 MMOL/L (ref 20–29)
CREAT SERPL-MCNC: 0.76 MG/DL (ref 0.57–1)
CREAT UR-MCNC: 89.4 MG/DL
EOSINOPHIL # BLD AUTO: 0.5 X10E3/UL (ref 0–0.4)
EOSINOPHIL NFR BLD AUTO: 9 %
ERYTHROCYTE [DISTWIDTH] IN BLOOD BY AUTOMATED COUNT: 12.8 % (ref 11.7–15.4)
EST. AVERAGE GLUCOSE BLD GHB EST-MCNC: 131 MG/DL
GLOBULIN SER-MCNC: 3.3 G/DL (ref 1.5–4.5)
GLUCOSE SERPL-MCNC: 87 MG/DL (ref 65–99)
HBA1C MFR BLD: 6.2 % (ref 4.8–5.6)
HCT VFR BLD AUTO: 44.6 % (ref 34–46.6)
HDLC SERPL-MCNC: 37 MG/DL
HGB BLD-MCNC: 15.3 G/DL (ref 11.1–15.9)
IMM GRANULOCYTES # BLD: 0 X10E3/UL (ref 0–0.1)
IMM GRANULOCYTES NFR BLD: 1 %
LDLC SERPL CALC-MCNC: 110 MG/DL (ref 0–99)
LYMPHOCYTES # BLD AUTO: 2.1 X10E3/UL (ref 0.7–3.1)
LYMPHOCYTES NFR BLD AUTO: 38 %
MCH RBC QN AUTO: 31.2 PG (ref 26.6–33)
MCHC RBC AUTO-ENTMCNC: 34.3 G/DL (ref 31.5–35.7)
MCV RBC AUTO: 91 FL (ref 79–97)
MICROALBUMIN UR-MCNC: 26 UG/ML
MONOCYTES # BLD AUTO: 0.6 X10E3/UL (ref 0.1–0.9)
MONOCYTES NFR BLD AUTO: 12 %
NEUTROPHILS # BLD AUTO: 2.1 X10E3/UL (ref 1.4–7)
NEUTROPHILS NFR BLD AUTO: 39 %
PLATELET # BLD AUTO: 312 X10E3/UL (ref 150–450)
POTASSIUM SERPL-SCNC: 4.3 MMOL/L (ref 3.5–5.2)
PROT SERPL-MCNC: 7.7 G/DL (ref 6–8.5)
RBC # BLD AUTO: 4.9 X10E6/UL (ref 3.77–5.28)
SL AMB EGFR AFRICAN AMERICAN: 101 ML/MIN/1.73
SL AMB EGFR NON AFRICAN AMERICAN: 87 ML/MIN/1.73
SL AMB VLDL CHOLESTEROL CALC: 28 MG/DL (ref 5–40)
SODIUM SERPL-SCNC: 140 MMOL/L (ref 134–144)
TRIGL SERPL-MCNC: 140 MG/DL (ref 0–149)
WBC # BLD AUTO: 5.5 X10E3/UL (ref 3.4–10.8)

## 2020-05-13 PROCEDURE — 58100 BIOPSY OF UTERUS LINING: CPT | Performed by: OBSTETRICS & GYNECOLOGY

## 2020-05-13 PROCEDURE — 3075F SYST BP GE 130 - 139MM HG: CPT | Performed by: OBSTETRICS & GYNECOLOGY

## 2020-05-13 PROCEDURE — 3044F HG A1C LEVEL LT 7.0%: CPT | Performed by: OBSTETRICS & GYNECOLOGY

## 2020-05-13 PROCEDURE — 99214 OFFICE O/P EST MOD 30 MIN: CPT | Performed by: OBSTETRICS & GYNECOLOGY

## 2020-05-13 PROCEDURE — 3061F NEG MICROALBUMINURIA REV: CPT | Performed by: OBSTETRICS & GYNECOLOGY

## 2020-05-13 PROCEDURE — 3078F DIAST BP <80 MM HG: CPT | Performed by: OBSTETRICS & GYNECOLOGY

## 2020-05-13 PROCEDURE — 3008F BODY MASS INDEX DOCD: CPT | Performed by: OBSTETRICS & GYNECOLOGY

## 2020-05-18 ENCOUNTER — TELEPHONE (OUTPATIENT)
Dept: HEMATOLOGY ONCOLOGY | Facility: CLINIC | Age: 60
End: 2020-05-18

## 2020-05-18 ENCOUNTER — TELEPHONE (OUTPATIENT)
Dept: OBGYN CLINIC | Facility: CLINIC | Age: 60
End: 2020-05-18

## 2020-05-18 DIAGNOSIS — C54.1 ENDOMETRIAL CANCER, GRADE I (HCC): Primary | ICD-10-CM

## 2020-05-18 LAB
PATH REPORT.COMMENTS IMP SPEC: NORMAL
PATH REPORT.SITE OF ORIGIN SPEC: NORMAL
PAYMENT PROCEDURE: NORMAL
SL AMB .: NORMAL

## 2020-05-19 ENCOUNTER — TELEPHONE (OUTPATIENT)
Dept: GYNECOLOGIC ONCOLOGY | Facility: CLINIC | Age: 60
End: 2020-05-19

## 2020-05-20 ENCOUNTER — CONSULT (OUTPATIENT)
Dept: GYNECOLOGIC ONCOLOGY | Facility: HOSPITAL | Age: 60
End: 2020-05-20
Payer: COMMERCIAL

## 2020-05-20 ENCOUNTER — HOSPITAL ENCOUNTER (OUTPATIENT)
Dept: RADIOLOGY | Facility: HOSPITAL | Age: 60
Discharge: HOME | End: 2020-05-20
Attending: OBSTETRICS & GYNECOLOGY
Payer: COMMERCIAL

## 2020-05-20 ENCOUNTER — APPOINTMENT (OUTPATIENT)
Dept: LAB | Facility: HOSPITAL | Age: 60
End: 2020-05-20
Attending: OBSTETRICS & GYNECOLOGY
Payer: COMMERCIAL

## 2020-05-20 ENCOUNTER — TRANSCRIBE ORDERS (OUTPATIENT)
Dept: ADMINISTRATIVE | Facility: HOSPITAL | Age: 60
End: 2020-05-20

## 2020-05-20 VITALS
DIASTOLIC BLOOD PRESSURE: 60 MMHG | SYSTOLIC BLOOD PRESSURE: 108 MMHG | RESPIRATION RATE: 18 BRPM | HEIGHT: 62 IN | HEART RATE: 80 BPM | WEIGHT: 196 LBS | TEMPERATURE: 98.3 F | BODY MASS INDEX: 36.07 KG/M2

## 2020-05-20 DIAGNOSIS — C54.1 ENDOMETRIAL CANCER (HCC): ICD-10-CM

## 2020-05-20 DIAGNOSIS — C54.1 ENDOMETRIAL CANCER, GRADE I (HCC): ICD-10-CM

## 2020-05-20 DIAGNOSIS — Z00.00 UNSPECIFIED EXAMINATION: Primary | ICD-10-CM

## 2020-05-20 DIAGNOSIS — C54.1 ENDOMETRIAL CANCER (HCC): Primary | ICD-10-CM

## 2020-05-20 DIAGNOSIS — E03.8 OTHER SPECIFIED HYPOTHYROIDISM: ICD-10-CM

## 2020-05-20 DIAGNOSIS — Z00.00 UNSPECIFIED EXAMINATION: ICD-10-CM

## 2020-05-20 PROBLEM — N95.0 PMB (POSTMENOPAUSAL BLEEDING): Status: RESOLVED | Noted: 2020-05-13 | Resolved: 2020-05-20

## 2020-05-20 LAB
ABO GROUP BLD: NORMAL
BLD GP AB SCN SERPL QL: NEGATIVE
RH BLD: NEGATIVE
SPECIMEN EXPIRATION DATE: NORMAL

## 2020-05-20 PROCEDURE — 86901 BLOOD TYPING SEROLOGIC RH(D): CPT | Performed by: OBSTETRICS & GYNECOLOGY

## 2020-05-20 PROCEDURE — 86900 BLOOD TYPING SEROLOGIC ABO: CPT | Performed by: OBSTETRICS & GYNECOLOGY

## 2020-05-20 PROCEDURE — 93005 ELECTROCARDIOGRAM TRACING: CPT

## 2020-05-20 PROCEDURE — 71046 X-RAY EXAM CHEST 2 VIEWS: CPT

## 2020-05-20 PROCEDURE — 86850 RBC ANTIBODY SCREEN: CPT | Performed by: OBSTETRICS & GYNECOLOGY

## 2020-05-20 PROCEDURE — 99245 OFF/OP CONSLTJ NEW/EST HI 55: CPT | Performed by: OBSTETRICS & GYNECOLOGY

## 2020-05-20 PROCEDURE — 36415 COLL VENOUS BLD VENIPUNCTURE: CPT

## 2020-05-20 RX ORDER — SODIUM CHLORIDE, SODIUM LACTATE, POTASSIUM CHLORIDE, CALCIUM CHLORIDE 600; 310; 30; 20 MG/100ML; MG/100ML; MG/100ML; MG/100ML
125 INJECTION, SOLUTION INTRAVENOUS CONTINUOUS
Status: CANCELLED | OUTPATIENT
Start: 2020-05-28

## 2020-05-20 RX ORDER — CEFAZOLIN SODIUM 2 G/50ML
2000 SOLUTION INTRAVENOUS ONCE
Status: CANCELLED | OUTPATIENT
Start: 2020-05-28 | End: 2020-05-20

## 2020-05-20 RX ORDER — SECUKINUMAB 150 MG/ML
INJECTION SUBCUTANEOUS WEEKLY
COMMUNITY
Start: 2020-05-12 | End: 2021-01-26

## 2020-05-20 RX ORDER — HEPARIN SODIUM 5000 [USP'U]/ML
5000 INJECTION, SOLUTION INTRAVENOUS; SUBCUTANEOUS
Status: CANCELLED | OUTPATIENT
Start: 2020-05-29 | End: 2020-05-30

## 2020-05-20 RX ORDER — ACETAMINOPHEN 325 MG/1
975 TABLET ORAL ONCE
Status: CANCELLED | OUTPATIENT
Start: 2020-05-28 | End: 2020-05-20

## 2020-05-21 DIAGNOSIS — C54.1 ENDOMETRIAL CANCER (HCC): ICD-10-CM

## 2020-05-21 PROCEDURE — U0003 INFECTIOUS AGENT DETECTION BY NUCLEIC ACID (DNA OR RNA); SEVERE ACUTE RESPIRATORY SYNDROME CORONAVIRUS 2 (SARS-COV-2) (CORONAVIRUS DISEASE [COVID-19]), AMPLIFIED PROBE TECHNIQUE, MAKING USE OF HIGH THROUGHPUT TECHNOLOGIES AS DESCRIBED BY CMS-2020-01-R: HCPCS

## 2020-05-22 ENCOUNTER — HOSPITAL ENCOUNTER (OUTPATIENT)
Dept: BONE DENSITY | Facility: IMAGING CENTER | Age: 60
Discharge: HOME | End: 2020-05-22
Payer: COMMERCIAL

## 2020-05-22 VITALS — BODY MASS INDEX: 34.55 KG/M2 | HEIGHT: 63 IN | WEIGHT: 195 LBS

## 2020-05-22 DIAGNOSIS — Z12.31 SCREENING MAMMOGRAM FOR HIGH-RISK PATIENT: ICD-10-CM

## 2020-05-22 LAB
ATRIAL RATE: 77 BPM
P AXIS: 29 DEGREES
PR INTERVAL: 156 MS
QRS AXIS: 28 DEGREES
QRSD INTERVAL: 74 MS
QT INTERVAL: 390 MS
QTC INTERVAL: 441 MS
T WAVE AXIS: 20 DEGREES
VENTRICULAR RATE: 77 BPM

## 2020-05-22 PROCEDURE — 77063 BREAST TOMOSYNTHESIS BI: CPT

## 2020-05-22 PROCEDURE — 77067 SCR MAMMO BI INCL CAD: CPT

## 2020-05-22 PROCEDURE — 93010 ELECTROCARDIOGRAM REPORT: CPT | Performed by: INTERNAL MEDICINE

## 2020-05-23 LAB — SARS-COV-2 RNA SPEC QL NAA+PROBE: NOT DETECTED

## 2020-05-27 ENCOUNTER — ANESTHESIA EVENT (OUTPATIENT)
Dept: PERIOP | Facility: HOSPITAL | Age: 60
End: 2020-05-27
Payer: COMMERCIAL

## 2020-05-28 ENCOUNTER — HOSPITAL ENCOUNTER (OUTPATIENT)
Facility: HOSPITAL | Age: 60
Setting detail: OUTPATIENT SURGERY
Discharge: HOME | End: 2020-05-28
Attending: OBSTETRICS & GYNECOLOGY | Admitting: OBSTETRICS & GYNECOLOGY
Payer: COMMERCIAL

## 2020-05-28 ENCOUNTER — ANESTHESIA (OUTPATIENT)
Dept: PERIOP | Facility: HOSPITAL | Age: 60
End: 2020-05-28
Payer: COMMERCIAL

## 2020-05-28 VITALS
OXYGEN SATURATION: 95 % | HEIGHT: 62 IN | WEIGHT: 196 LBS | SYSTOLIC BLOOD PRESSURE: 114 MMHG | HEART RATE: 73 BPM | RESPIRATION RATE: 16 BRPM | TEMPERATURE: 97.2 F | BODY MASS INDEX: 36.07 KG/M2 | DIASTOLIC BLOOD PRESSURE: 62 MMHG

## 2020-05-28 DIAGNOSIS — C54.1 ENDOMETRIAL CANCER (HCC): ICD-10-CM

## 2020-05-28 LAB
ABO GROUP BLD: NORMAL
GLUCOSE SERPL-MCNC: 111 MG/DL (ref 65–140)
GLUCOSE SERPL-MCNC: 130 MG/DL (ref 65–140)
RH BLD: NEGATIVE

## 2020-05-28 PROCEDURE — 38570 LAPAROSCOPY LYMPH NODE BIOP: CPT | Performed by: OBSTETRICS & GYNECOLOGY

## 2020-05-28 PROCEDURE — 88342 IMHCHEM/IMCYTCHM 1ST ANTB: CPT | Performed by: PATHOLOGY

## 2020-05-28 PROCEDURE — 88112 CYTOPATH CELL ENHANCE TECH: CPT | Performed by: PATHOLOGY

## 2020-05-28 PROCEDURE — 38900 IO MAP OF SENT LYMPH NODE: CPT | Performed by: PHYSICIAN ASSISTANT

## 2020-05-28 PROCEDURE — 88309 TISSUE EXAM BY PATHOLOGIST: CPT | Performed by: PATHOLOGY

## 2020-05-28 PROCEDURE — 82948 REAGENT STRIP/BLOOD GLUCOSE: CPT

## 2020-05-28 PROCEDURE — 58571 TLH W/T/O 250 G OR LESS: CPT | Performed by: PHYSICIAN ASSISTANT

## 2020-05-28 PROCEDURE — 86901 BLOOD TYPING SEROLOGIC RH(D): CPT | Performed by: OBSTETRICS & GYNECOLOGY

## 2020-05-28 PROCEDURE — 58571 TLH W/T/O 250 G OR LESS: CPT | Performed by: OBSTETRICS & GYNECOLOGY

## 2020-05-28 PROCEDURE — 38900 IO MAP OF SENT LYMPH NODE: CPT | Performed by: OBSTETRICS & GYNECOLOGY

## 2020-05-28 PROCEDURE — 86900 BLOOD TYPING SEROLOGIC ABO: CPT | Performed by: OBSTETRICS & GYNECOLOGY

## 2020-05-28 PROCEDURE — 38570 LAPAROSCOPY LYMPH NODE BIOP: CPT | Performed by: PHYSICIAN ASSISTANT

## 2020-05-28 PROCEDURE — 88341 IMHCHEM/IMCYTCHM EA ADD ANTB: CPT | Performed by: PATHOLOGY

## 2020-05-28 PROCEDURE — 88307 TISSUE EXAM BY PATHOLOGIST: CPT | Performed by: PATHOLOGY

## 2020-05-28 RX ORDER — HYDROMORPHONE HCL/PF 1 MG/ML
SYRINGE (ML) INJECTION AS NEEDED
Status: DISCONTINUED | OUTPATIENT
Start: 2020-05-28 | End: 2020-05-28 | Stop reason: SURG

## 2020-05-28 RX ORDER — LIDOCAINE HYDROCHLORIDE 20 MG/ML
INJECTION, SOLUTION EPIDURAL; INFILTRATION; INTRACAUDAL; PERINEURAL AS NEEDED
Status: DISCONTINUED | OUTPATIENT
Start: 2020-05-28 | End: 2020-05-28 | Stop reason: SURG

## 2020-05-28 RX ORDER — ROCURONIUM BROMIDE 10 MG/ML
INJECTION, SOLUTION INTRAVENOUS AS NEEDED
Status: DISCONTINUED | OUTPATIENT
Start: 2020-05-28 | End: 2020-05-28 | Stop reason: SURG

## 2020-05-28 RX ORDER — CEFAZOLIN SODIUM 2 G/50ML
2000 SOLUTION INTRAVENOUS ONCE
Status: COMPLETED | OUTPATIENT
Start: 2020-05-28 | End: 2020-05-28

## 2020-05-28 RX ORDER — HEPARIN SODIUM 5000 [USP'U]/ML
5000 INJECTION, SOLUTION INTRAVENOUS; SUBCUTANEOUS
Status: COMPLETED | OUTPATIENT
Start: 2020-05-28 | End: 2020-05-28

## 2020-05-28 RX ORDER — MAGNESIUM HYDROXIDE 1200 MG/15ML
LIQUID ORAL AS NEEDED
Status: DISCONTINUED | OUTPATIENT
Start: 2020-05-28 | End: 2020-05-28 | Stop reason: HOSPADM

## 2020-05-28 RX ORDER — NEOSTIGMINE METHYLSULFATE 1 MG/ML
INJECTION INTRAVENOUS AS NEEDED
Status: DISCONTINUED | OUTPATIENT
Start: 2020-05-28 | End: 2020-05-28 | Stop reason: SURG

## 2020-05-28 RX ORDER — KETOROLAC TROMETHAMINE 30 MG/ML
INJECTION, SOLUTION INTRAMUSCULAR; INTRAVENOUS AS NEEDED
Status: DISCONTINUED | OUTPATIENT
Start: 2020-05-28 | End: 2020-05-28 | Stop reason: SURG

## 2020-05-28 RX ORDER — DEXAMETHASONE SODIUM PHOSPHATE 4 MG/ML
INJECTION, SOLUTION INTRA-ARTICULAR; INTRALESIONAL; INTRAMUSCULAR; INTRAVENOUS; SOFT TISSUE AS NEEDED
Status: DISCONTINUED | OUTPATIENT
Start: 2020-05-28 | End: 2020-05-28 | Stop reason: SURG

## 2020-05-28 RX ORDER — FENTANYL CITRATE/PF 50 MCG/ML
25 SYRINGE (ML) INJECTION
Status: DISCONTINUED | OUTPATIENT
Start: 2020-05-28 | End: 2020-05-28 | Stop reason: HOSPADM

## 2020-05-28 RX ORDER — PROPOFOL 10 MG/ML
INJECTION, EMULSION INTRAVENOUS AS NEEDED
Status: DISCONTINUED | OUTPATIENT
Start: 2020-05-28 | End: 2020-05-28 | Stop reason: SURG

## 2020-05-28 RX ORDER — ONDANSETRON 2 MG/ML
4 INJECTION INTRAMUSCULAR; INTRAVENOUS ONCE AS NEEDED
Status: COMPLETED | OUTPATIENT
Start: 2020-05-28 | End: 2020-05-28

## 2020-05-28 RX ORDER — ONDANSETRON 2 MG/ML
INJECTION INTRAMUSCULAR; INTRAVENOUS AS NEEDED
Status: DISCONTINUED | OUTPATIENT
Start: 2020-05-28 | End: 2020-05-28 | Stop reason: SURG

## 2020-05-28 RX ORDER — FENTANYL CITRATE 50 UG/ML
INJECTION, SOLUTION INTRAMUSCULAR; INTRAVENOUS AS NEEDED
Status: DISCONTINUED | OUTPATIENT
Start: 2020-05-28 | End: 2020-05-28 | Stop reason: SURG

## 2020-05-28 RX ORDER — OXYCODONE HYDROCHLORIDE 5 MG/1
5 TABLET ORAL EVERY 4 HOURS PRN
Status: DISCONTINUED | OUTPATIENT
Start: 2020-05-28 | End: 2020-05-28 | Stop reason: HOSPADM

## 2020-05-28 RX ORDER — OXYCODONE HYDROCHLORIDE 5 MG/1
5 TABLET ORAL EVERY 6 HOURS PRN
Qty: 10 TABLET | Refills: 0 | Status: SHIPPED | OUTPATIENT
Start: 2020-05-28 | End: 2020-06-07

## 2020-05-28 RX ORDER — INDOCYANINE GREEN AND WATER 25 MG
KIT INJECTION AS NEEDED
Status: DISCONTINUED | OUTPATIENT
Start: 2020-05-28 | End: 2020-05-28 | Stop reason: HOSPADM

## 2020-05-28 RX ORDER — ACETAMINOPHEN 325 MG/1
975 TABLET ORAL ONCE
Status: COMPLETED | OUTPATIENT
Start: 2020-05-28 | End: 2020-05-28

## 2020-05-28 RX ORDER — BUPIVACAINE HYDROCHLORIDE 2.5 MG/ML
INJECTION, SOLUTION EPIDURAL; INFILTRATION; INTRACAUDAL AS NEEDED
Status: DISCONTINUED | OUTPATIENT
Start: 2020-05-28 | End: 2020-05-28 | Stop reason: HOSPADM

## 2020-05-28 RX ORDER — MIDAZOLAM HYDROCHLORIDE 2 MG/2ML
INJECTION, SOLUTION INTRAMUSCULAR; INTRAVENOUS AS NEEDED
Status: DISCONTINUED | OUTPATIENT
Start: 2020-05-28 | End: 2020-05-28 | Stop reason: SURG

## 2020-05-28 RX ORDER — HYDROMORPHONE HCL/PF 1 MG/ML
0.5 SYRINGE (ML) INJECTION
Status: DISCONTINUED | OUTPATIENT
Start: 2020-05-28 | End: 2020-05-28 | Stop reason: HOSPADM

## 2020-05-28 RX ORDER — SODIUM CHLORIDE 9 MG/ML
125 INJECTION, SOLUTION INTRAVENOUS CONTINUOUS
Status: DISCONTINUED | OUTPATIENT
Start: 2020-05-28 | End: 2020-05-28 | Stop reason: HOSPADM

## 2020-05-28 RX ORDER — GLYCOPYRROLATE 0.2 MG/ML
INJECTION INTRAMUSCULAR; INTRAVENOUS AS NEEDED
Status: DISCONTINUED | OUTPATIENT
Start: 2020-05-28 | End: 2020-05-28 | Stop reason: SURG

## 2020-05-28 RX ADMIN — PHENYLEPHRINE HYDROCHLORIDE 100 MCG: 10 INJECTION INTRAVENOUS at 14:24

## 2020-05-28 RX ADMIN — GLYCOPYRROLATE 0.6 MG: 0.2 INJECTION, SOLUTION INTRAMUSCULAR; INTRAVENOUS at 15:33

## 2020-05-28 RX ADMIN — FENTANYL CITRATE 25 MCG: 50 INJECTION, SOLUTION INTRAMUSCULAR; INTRAVENOUS at 16:00

## 2020-05-28 RX ADMIN — PROPOFOL 200 MG: 10 INJECTION, EMULSION INTRAVENOUS at 13:24

## 2020-05-28 RX ADMIN — KETOROLAC TROMETHAMINE 15 MG: 30 INJECTION, SOLUTION INTRAMUSCULAR at 15:40

## 2020-05-28 RX ADMIN — ROCURONIUM BROMIDE 20 MG: 10 INJECTION, SOLUTION INTRAVENOUS at 13:44

## 2020-05-28 RX ADMIN — FENTANYL CITRATE 100 MCG: 50 INJECTION, SOLUTION INTRAMUSCULAR; INTRAVENOUS at 14:05

## 2020-05-28 RX ADMIN — PHENYLEPHRINE HYDROCHLORIDE 100 MCG: 10 INJECTION INTRAVENOUS at 14:20

## 2020-05-28 RX ADMIN — CEFAZOLIN SODIUM 2000 MG: 2 SOLUTION INTRAVENOUS at 13:15

## 2020-05-28 RX ADMIN — SODIUM CHLORIDE: 0.9 INJECTION, SOLUTION INTRAVENOUS at 15:21

## 2020-05-28 RX ADMIN — MIDAZOLAM 2 MG: 1 INJECTION INTRAMUSCULAR; INTRAVENOUS at 13:15

## 2020-05-28 RX ADMIN — PHENYLEPHRINE HYDROCHLORIDE 100 MCG: 10 INJECTION INTRAVENOUS at 14:49

## 2020-05-28 RX ADMIN — HYDROMORPHONE HYDROCHLORIDE 0.5 MG: 1 INJECTION, SOLUTION INTRAMUSCULAR; INTRAVENOUS; SUBCUTANEOUS at 16:13

## 2020-05-28 RX ADMIN — HYDROMORPHONE HYDROCHLORIDE 1 MG: 1 INJECTION, SOLUTION INTRAMUSCULAR; INTRAVENOUS; SUBCUTANEOUS at 14:58

## 2020-05-28 RX ADMIN — SODIUM CHLORIDE: 0.9 INJECTION, SOLUTION INTRAVENOUS at 14:05

## 2020-05-28 RX ADMIN — DEXAMETHASONE SODIUM PHOSPHATE 4 MG: 4 INJECTION, SOLUTION INTRAMUSCULAR; INTRAVENOUS at 13:30

## 2020-05-28 RX ADMIN — ONDANSETRON 4 MG: 2 INJECTION INTRAMUSCULAR; INTRAVENOUS at 13:30

## 2020-05-28 RX ADMIN — LIDOCAINE HYDROCHLORIDE 100 MG: 20 INJECTION, SOLUTION EPIDURAL; INFILTRATION; INTRACAUDAL; PERINEURAL at 13:24

## 2020-05-28 RX ADMIN — SODIUM CHLORIDE 125 ML/HR: 0.9 INJECTION, SOLUTION INTRAVENOUS at 12:16

## 2020-05-28 RX ADMIN — HEPARIN SODIUM 5000 UNITS: 5000 INJECTION INTRAVENOUS; SUBCUTANEOUS at 12:44

## 2020-05-28 RX ADMIN — ROCURONIUM BROMIDE 50 MG: 10 INJECTION, SOLUTION INTRAVENOUS at 13:25

## 2020-05-28 RX ADMIN — HYDROMORPHONE HYDROCHLORIDE 0.5 MG: 1 INJECTION, SOLUTION INTRAMUSCULAR; INTRAVENOUS; SUBCUTANEOUS at 16:29

## 2020-05-28 RX ADMIN — ACETAMINOPHEN 975 MG: 325 TABLET, FILM COATED ORAL at 12:28

## 2020-05-28 RX ADMIN — ONDANSETRON 4 MG: 2 INJECTION INTRAMUSCULAR; INTRAVENOUS at 17:08

## 2020-05-28 RX ADMIN — SODIUM CHLORIDE: 0.9 INJECTION, SOLUTION INTRAVENOUS at 14:25

## 2020-05-28 RX ADMIN — FENTANYL CITRATE 100 MCG: 50 INJECTION, SOLUTION INTRAMUSCULAR; INTRAVENOUS at 13:24

## 2020-05-28 RX ADMIN — NEOSTIGMINE METHYLSULFATE 3 MG: 1 INJECTION, SOLUTION INTRAVENOUS at 15:33

## 2020-05-28 RX ADMIN — FENTANYL CITRATE 25 MCG: 50 INJECTION, SOLUTION INTRAMUSCULAR; INTRAVENOUS at 15:54

## 2020-06-02 ENCOUNTER — TELEPHONE (OUTPATIENT)
Dept: OTHER | Facility: HOSPITAL | Age: 60
End: 2020-06-02

## 2020-06-10 ENCOUNTER — OFFICE VISIT (OUTPATIENT)
Dept: GYNECOLOGIC ONCOLOGY | Facility: HOSPITAL | Age: 60
End: 2020-06-10

## 2020-06-10 VITALS
HEART RATE: 105 BPM | OXYGEN SATURATION: 97 % | DIASTOLIC BLOOD PRESSURE: 82 MMHG | HEIGHT: 62 IN | BODY MASS INDEX: 35.44 KG/M2 | TEMPERATURE: 98.5 F | WEIGHT: 192.6 LBS | SYSTOLIC BLOOD PRESSURE: 128 MMHG

## 2020-06-10 DIAGNOSIS — Z98.890 POSTOPERATIVE STATE: Primary | ICD-10-CM

## 2020-06-10 DIAGNOSIS — C54.1 ENDOMETRIAL CANCER (HCC): ICD-10-CM

## 2020-06-10 PROCEDURE — 3079F DIAST BP 80-89 MM HG: CPT | Performed by: PHYSICIAN ASSISTANT

## 2020-06-10 PROCEDURE — 99024 POSTOP FOLLOW-UP VISIT: CPT | Performed by: PHYSICIAN ASSISTANT

## 2020-06-10 PROCEDURE — 3074F SYST BP LT 130 MM HG: CPT | Performed by: PHYSICIAN ASSISTANT

## 2020-06-10 PROCEDURE — 3008F BODY MASS INDEX DOCD: CPT | Performed by: PHYSICIAN ASSISTANT

## 2020-06-11 ENCOUNTER — TELEPHONE (OUTPATIENT)
Dept: INFUSION CENTER | Facility: HOSPITAL | Age: 60
End: 2020-06-11

## 2020-06-18 DIAGNOSIS — E11.9 TYPE 2 DIABETES MELLITUS WITHOUT COMPLICATION, WITHOUT LONG-TERM CURRENT USE OF INSULIN (HCC): ICD-10-CM

## 2020-06-18 DIAGNOSIS — E78.1 ESSENTIAL HYPERTRIGLYCERIDEMIA: ICD-10-CM

## 2020-06-18 DIAGNOSIS — I10 ESSENTIAL HYPERTENSION: ICD-10-CM

## 2020-06-18 PROCEDURE — 4010F ACE/ARB THERAPY RXD/TAKEN: CPT | Performed by: OBSTETRICS & GYNECOLOGY

## 2020-06-18 RX ORDER — LISINOPRIL 20 MG/1
TABLET ORAL
Qty: 90 TABLET | Refills: 0 | Status: SHIPPED | OUTPATIENT
Start: 2020-06-18 | End: 2020-11-24

## 2020-06-18 RX ORDER — FENOFIBRATE 200 MG/1
CAPSULE ORAL
Qty: 90 CAPSULE | Refills: 0 | Status: SHIPPED | OUTPATIENT
Start: 2020-06-18 | End: 2020-11-24

## 2020-06-30 DIAGNOSIS — E03.8 OTHER SPECIFIED HYPOTHYROIDISM: ICD-10-CM

## 2020-06-30 RX ORDER — LEVOTHYROXINE SODIUM 0.2 MG/1
TABLET ORAL
Qty: 90 TABLET | Refills: 2
Start: 2020-06-30 | End: 2020-11-24

## 2020-07-06 ENCOUNTER — TELEPHONE (OUTPATIENT)
Dept: GYNECOLOGIC ONCOLOGY | Facility: CLINIC | Age: 60
End: 2020-07-06

## 2020-07-06 NOTE — TELEPHONE ENCOUNTER
Covid screening was negative and patient is aware of 1 person rule, masking policy for upcoming appointment

## 2020-07-08 ENCOUNTER — OFFICE VISIT (OUTPATIENT)
Dept: GYNECOLOGIC ONCOLOGY | Facility: HOSPITAL | Age: 60
End: 2020-07-08

## 2020-07-08 VITALS
SYSTOLIC BLOOD PRESSURE: 136 MMHG | HEIGHT: 62 IN | OXYGEN SATURATION: 98 % | TEMPERATURE: 97.3 F | BODY MASS INDEX: 36.22 KG/M2 | DIASTOLIC BLOOD PRESSURE: 82 MMHG | WEIGHT: 196.8 LBS | HEART RATE: 92 BPM

## 2020-07-08 DIAGNOSIS — C54.1 ENDOMETRIAL CANCER (HCC): Primary | ICD-10-CM

## 2020-07-08 DIAGNOSIS — Z98.890 POSTOPERATIVE STATE: ICD-10-CM

## 2020-07-08 PROCEDURE — 3075F SYST BP GE 130 - 139MM HG: CPT | Performed by: PHYSICIAN ASSISTANT

## 2020-07-08 PROCEDURE — 3079F DIAST BP 80-89 MM HG: CPT | Performed by: PHYSICIAN ASSISTANT

## 2020-07-08 PROCEDURE — 99024 POSTOP FOLLOW-UP VISIT: CPT | Performed by: PHYSICIAN ASSISTANT

## 2020-07-08 PROCEDURE — 3008F BODY MASS INDEX DOCD: CPT | Performed by: OBSTETRICS & GYNECOLOGY

## 2020-07-08 PROCEDURE — 3008F BODY MASS INDEX DOCD: CPT | Performed by: PHYSICIAN ASSISTANT

## 2020-07-08 NOTE — ASSESSMENT & PLAN NOTE
Biopsy proven grade 1 endometrial cancer now s/p robotic-assisted TLH, BSO and sentinel LND on 5/28/20  Final pathology was consistent with stage IA, grade 1 endometrial cancer (no myometrial invasion, no LVSI, two negative LNs)  She is recovering well from surgery  She continues to have intermittent vaginal spotting  Vagina is intact, with no active bleeding  No gross granulation tissue present  The patient will continue to monitor her vaginal spotting  If persistent, for greater than 2 weeks, will return for repeat vaginal cuff exam       The patient has low-risk disease  She will not require adjuvant treatment  We discussed surveillance visits every 6 months for 2 years, then annually thereafter       Return to the office in 6 months for endometrial cancer surveillance or sooner if spotting persists

## 2020-07-08 NOTE — PROGRESS NOTES
Assessment/Plan:    Problem List Items Addressed This Visit        Genitourinary    Endometrial cancer [facility]) - Primary     Biopsy proven grade 1 endometrial cancer now s/p robotic-assisted TLH, BSO and sentinel LND on 5/28/20  Final pathology was consistent with stage IA, grade 1 endometrial cancer (no myometrial invasion, no LVSI, two negative LNs)  She is recovering well from surgery  She continues to have intermittent vaginal spotting  Vagina is intact, with no active bleeding  No gross granulation tissue present  The patient will continue to monitor her vaginal spotting  If persistent, for greater than 2 weeks, will return for repeat vaginal cuff exam       The patient has low-risk disease  She will not require adjuvant treatment  We discussed surveillance visits every 6 months for 2 years, then annually thereafter       Return to the office in 6 months for endometrial cancer surveillance or sooner if spotting persists  Other    Postoperative state            CHIEF COMPLAINT:   Post-operative evaluation    Problem:  Cancer Staging  Endometrial cancer [facility])  Staging form: Corpus Uteri - Carcinoma, AJCC 8th Edition  - Clinical: FIGO Stage IA - Signed by Preeti Hudson PA-C on 6/10/2020        Previous therapy:     Endometrial cancer ([facility] [address] )    5/28/2020 Surgery     Robotic assisted TLH, BSO, sentinel LND and cystoscopy   A  Grade 1  B  No myometrial invasion, no LVSI  C  2 negative lymph nodes  D  IHC, all proteins intact  6/10/2020 -  Cancer Staged     Staging form: Corpus Uteri - Carcinoma, AJCC 8th Edition  - Clinical: FIGO Stage IA - Signed by Preeti Hudson PA-C on 6/10/2020  Histologic grade (G): G1  Histologic grading system: 3 grade system             Patient ID: Lelia Nunez is a 64 y o  female  who presents to the office for continued post-operative evaluation  Overall, the patient is recovering well  She has been afebrile  The patient has mild, intermittent pelvic discomfort  She notes daily vaginal spotting  Normal bowel/bladder function  Appetite is appropriate  No n/v  The following portions of the patient's history were reviewed and updated as appropriate: allergies, current medications, past medical history, past surgical history and problem list     Review of Systems   Constitutional: Negative  Respiratory: Negative  Cardiovascular: Negative  Gastrointestinal: Negative  Genitourinary:        Per HPI  Skin: Negative  Current Outpatient Medications   Medication Sig Dispense Refill    albuterol (PROVENTIL HFA,VENTOLIN HFA) 90 mcg/act inhaler Inhale 2 puffs every 4 (four) hours as needed for wheezing or shortness of breath 3 Inhaler 3    cholecalciferol (VITAMIN D3) 1,000 units tablet Take 1 tablet by mouth daily      COSENTYX SENSOREADY, 300 MG, 150 MG/ML SOAJ injection once a week To hold for surgery      escitalopram (LEXAPRO) 10 mg tablet Take 1 tablet (10 mg total) by mouth daily 30 tablet 1    fenofibrate micronized (LOFIBRA) 200 MG capsule Take 1 capsule by mouth once daily 90 capsule 0    levothyroxine 200 mcg tablet 1 tab PO q day EXCEPT 1/2 tab once weekly 90 tablet 2    lisinopril (ZESTRIL) 20 mg tablet Take 1 tablet by mouth once daily 90 tablet 0    metFORMIN (GLUCOPHAGE-XR) 500 mg 24 hr tablet 2 tab PO q am 180 tablet 1     No current facility-administered medications for this visit  Objective:    Blood pressure 136/82, pulse 92, temperature (!) 97 3 °F (36 3 °C), temperature source Oral, height 5' 2" (1 575 m), weight 89 3 kg (196 lb 12 8 oz), last menstrual period 04/20/2020, SpO2 98 %, not currently breastfeeding  Body mass index is 36 kg/m²  Body surface area is 1 9 meters squared  Physical Exam   Constitutional: She is oriented to person, place, and time  She appears well-developed and well-nourished  Pulmonary/Chest: Effort normal    Abdominal: Soft  She exhibits no mass  There is no tenderness     Genitourinary: Genitourinary Comments: The external female genitalia is normal  The bartholin's, uretheral and skenes glands are normal  The urethral meatus is normal (midline with no lesions)  Anus without fissure or lesion  Speculum exam reveals a grossly normal vagina  Vagina is intact  No masses, lesions, discharge or bleeding  Old blood present, without active bleeding  No significant cystocele or rectocele noted  Bimanual exam notes a surgical absent cervix, uterus and adnexal structures  No masses or fullness  Bladder is without fullness, mass or tenderness  Neurological: She is alert and oriented to person, place, and time  Skin: Skin is warm and dry  No rash noted  Surgical trocar sites are well healed  Psychiatric: She has a normal mood and affect   Her behavior is normal  Thought content normal

## 2020-07-08 NOTE — LETTER
July 8, 2020     Patient: Tye Stone   YOB: 1960   Date of Visit: 7/8/2020       To Whom it May Concern:    Nabila Rivas is under my professional care  She was seen in my office on 7/8/2020  She may return to work on 07/20/20 without restrictions       If you have any questions or concerns, please don't hesitate to call           Sincerely,          Russ Arellano PA-C        CC: No Recipients

## 2020-09-24 ENCOUNTER — TELEPHONE (OUTPATIENT)
Dept: FAMILY MEDICINE CLINIC | Facility: HOSPITAL | Age: 60
End: 2020-09-24

## 2020-09-24 NOTE — TELEPHONE ENCOUNTER
Patient had covid testing 9/22 - no results yet    Patient still not feeling good      Asking for call back

## 2020-09-24 NOTE — TELEPHONE ENCOUNTER
I looked in care everywhere and [facility] test noted ordered but no results - I recommend she call [facility] for results - I wouldn't test until we know those results, con't tx symptoms - cough med for cough/decongestant for congestion/Tylenol for body aches or fever, should have a video visit if worse symptoms

## 2020-09-25 ENCOUNTER — HOSPITAL ENCOUNTER (OUTPATIENT)
Dept: RADIOLOGY | Facility: HOSPITAL | Age: 60
Discharge: HOME | End: 2020-09-25
Payer: COMMERCIAL

## 2020-09-25 ENCOUNTER — TELEMEDICINE (OUTPATIENT)
Dept: FAMILY MEDICINE CLINIC | Facility: HOSPITAL | Age: 60
End: 2020-09-25
Payer: COMMERCIAL

## 2020-09-25 VITALS — BODY MASS INDEX: 36.07 KG/M2 | TEMPERATURE: 98 F | HEIGHT: 62 IN | WEIGHT: 196 LBS

## 2020-09-25 DIAGNOSIS — J20.8 ACUTE BACTERIAL BRONCHITIS: ICD-10-CM

## 2020-09-25 DIAGNOSIS — J20.8 ACUTE BACTERIAL BRONCHITIS: Primary | ICD-10-CM

## 2020-09-25 DIAGNOSIS — J45.41 MODERATE PERSISTENT ASTHMA WITH ACUTE EXACERBATION: ICD-10-CM

## 2020-09-25 DIAGNOSIS — E11.9 CONTROLLED TYPE 2 DIABETES MELLITUS WITHOUT COMPLICATION, WITHOUT LONG-TERM CURRENT USE OF INSULIN (HCC): ICD-10-CM

## 2020-09-25 DIAGNOSIS — B96.89 ACUTE BACTERIAL BRONCHITIS: Primary | ICD-10-CM

## 2020-09-25 DIAGNOSIS — B96.89 ACUTE BACTERIAL BRONCHITIS: ICD-10-CM

## 2020-09-25 PROCEDURE — 71046 X-RAY EXAM CHEST 2 VIEWS: CPT

## 2020-09-25 PROCEDURE — 99214 OFFICE O/P EST MOD 30 MIN: CPT | Performed by: FAMILY MEDICINE

## 2020-09-25 RX ORDER — PREDNISONE 20 MG/1
40 TABLET ORAL DAILY
Qty: 10 TABLET | Refills: 0 | Status: SHIPPED | OUTPATIENT
Start: 2020-09-25 | End: 2020-09-30

## 2020-09-25 RX ORDER — AZITHROMYCIN 250 MG/1
TABLET, FILM COATED ORAL
Qty: 6 TABLET | Refills: 0 | Status: SHIPPED | OUTPATIENT
Start: 2020-09-25 | End: 2020-09-30

## 2020-09-25 NOTE — PROGRESS NOTES
Virtual Regular Visit      Assessment/Plan:    Problem List Items Addressed This Visit        Endocrine    Controlled type 2 diabetes mellitus without complication, without long-term current use of insulin ([facility])       Respiratory    Moderate persistent asthma with acute exacerbation      Other Visit Diagnoses     Acute bacterial bronchitis    -  Primary    Relevant Medications    predniSONE 20 mg tablet    azithromycin (ZITHROMAX) 250 mg tablet    Other Relevant Orders    XR chest pa & lateral (Completed)          Plan/Discussion:    Consistent with acute bacterial bronchitis with underlying mild asthma exacerbation  Treat with prednisone and zaithromycin  CXR done today is negative for pneumonia  Previous/recent covid testing is negative  However due to current symptoms/illness as well as potentially false negative I have advised that she quarantine for 10 days and not to go to work until improvement  Symtpoms started 9/20/2020  I will see her virtually for f/u next week  Reason for visit is   Chief Complaint   Patient presents with    Sore Throat    chest congestion    Shortness of Breath    Cough    Wheezing    Virtual Regular Visit        Encounter provider Suzette Cherry MD    Provider located at [address] [place] [address]  [address] [place] [address]      Recent Visits  Date Type Provider Dept   09/24/20 Telephone [id number] recent visits within past 7 days and meeting all other requirements     Today's Visits  Date Type Provider Dept   09/25/20 Telemedicine Suzette Cherry MD  [facility]   Showing today's visits and meeting all other requirements     Future Appointments  No visits were found meeting these conditions  Showing future appointments within next 150 days and meeting all other requirements        The patient was identified by name and date of birth   Karina Villarreal was informed that this is a telemedicine visit and that the visit is being conducted through [address] and patient was informed that this is not a secure, HIPAA-complaint platform  She agrees to proceed     My office door was closed  No one else was in the room  She acknowledged consent and understanding of privacy and security of the video platform  The patient has agreed to participate and understands they can discontinue the visit at any time  Patient is aware this is a billable service  Subjective  Keeley Taylor is a 64 y o  female    Patient started with sytmpoms 2020  She had chest congestion, coughing, wheezing, sore thraot  No headaches, no ear pain, no anosmia  She does have shortness of breath on exertion  With feeling feverish and no chills  No known covid contacts or exposure  Was tested through Urgent care  Covid testing is negative  She continues to have sob, coughing ( worsening) RAMÍREZ, wheezing  Feeling feverish  Overall feels worse  Does have underlying asthma  Has been using her albuterol as needed with improvement              Past Medical History:   Diagnosis Date    Anxiety     Asthma     Diabetes mellitus (HCC)     NIDDM    Disease of thyroid gland     total thyroidectomy    Endometrial cancer ([facility] [address] ) 2020    total hystero this      High triglycerides     Hypertension     PMB (postmenopausal bleeding)     for hysterectomy today 2020-cancer dx    Psoriasis     Tobacco abuse     Wears glasses        Past Surgical History:   Procedure Laterality Date    CARPAL TUNNEL RELEASE Bilateral      SECTION, LOW TRANSVERSE      COLONOSCOPY      colo 16 - polyp at sigmoid colon - 6 yr if adenoma or 10 yr if hyperplastic pathology: early hyperplastic changes     CYSTOSCOPY N/A 2020    Procedure: CYSTOSCOPY;  Surgeon: Chiquita Dial MD;  [facility];  Service: Gynecology Oncology    GALLBLADDER SURGERY      LAPAROSCOPY FOR ECTOPIC PREGNANCY      With excision    NH INJECTION FOR LYMPHATIC XRAY Bilateral 5/28/2020    Procedure: LYMPHOGRAPHY WITH INDOCYANINE GREEN (ICG); Surgeon: Maria Del Carmen Vizcarra MD;  [facility];  Service: Gynecology Oncology    NH LAPAROSCOPY W TOT HYSTERECTUTERUS <=[address] [place] TUBE/OVARY N/A 5/28/2020    Procedure: ROBOTIC TOTAL HYSTERECTOMY, BSO, PELVIC NODE DISSECTION;  Surgeon: Maria Del Carmen Vizcarra MD;  [facility];  Service: Gynecology Oncology    NH WRIST [place] LIG Right 6/25/2019    Procedure: RELEASE CARPAL TUNNEL ENDOSCOPIC;  Surgeon: Roxanne Jorgensen MD;  [facility];  Service: Orthopedics    NH WRIST [place] LIG Left 8/8/2019    Procedure: RELEASE CARPAL TUNNEL ENDOSCOPIC;  Surgeon: Roxanne Jorgensen MD;  [facility]: [facility];  Service: Orthopedics    THYROID SURGERY      WRIST SURGERY         Current Outpatient Medications   Medication Sig Dispense Refill    albuterol (PROVENTIL HFA,VENTOLIN HFA) 90 mcg/act inhaler Inhale 2 puffs every 4 (four) hours as needed for wheezing or shortness of breath 3 Inhaler 3    cholecalciferol (VITAMIN D3) 1,000 units tablet Take 1 tablet by mouth daily      COSENTYX SENSOREADY, 300 MG, 150 MG/ML SOAJ injection once a week To hold for surgery      escitalopram (LEXAPRO) 10 mg tablet Take 1 tablet (10 mg total) by mouth daily 30 tablet 1    fenofibrate micronized (LOFIBRA) 200 MG capsule Take 1 capsule by mouth once daily 90 capsule 0    levothyroxine 200 mcg tablet 1 tab PO q day EXCEPT 1/2 tab once weekly 90 tablet 2    lisinopril (ZESTRIL) 20 mg tablet Take 1 tablet by mouth once daily 90 tablet 0    metFORMIN (GLUCOPHAGE-XR) 500 mg 24 hr tablet 2 tab PO q am 180 tablet 1    azithromycin (ZITHROMAX) 250 mg tablet Take 2 tablets (500 mg total) by mouth every 24 hours for 1 day, THEN 1 tablet (250 mg total) every 24 hours for 4 days   6 tablet 0    predniSONE 20 mg tablet Take 2 tablets (40 mg total) by mouth daily for 5 days 10 tablet 0     No current facility-administered medications for this visit  Allergies   Allergen Reactions    Methimazole Arthralgia     Reaction Date: [id number]; Annotation - [id number]: Joint pain       Review of Systems   Constitutional: Positive for activity change, appetite change, fatigue and fever  HENT: Positive for congestion  Respiratory: Positive for cough, shortness of breath and wheezing  Gastrointestinal: Negative for abdominal distention, abdominal pain, constipation, diarrhea and nausea  Video Exam    Vitals:    09/25/20 1012   Temp: 98 °F (36 7 °C)   Weight: 88 9 kg (196 lb)   Height: 5' 2" (1 575 m)       Physical Exam  Constitutional:       General: She is not in acute distress  Appearance: She is well-developed  She is ill-appearing  HENT:      Head: Normocephalic and atraumatic  Pulmonary:      Effort: Pulmonary effort is normal    Neurological:      Mental Status: She is alert  Psychiatric:         Mood and Affect: Mood normal          Behavior: Behavior normal           I spent 8 minutes directly with the patient during this visit      [address] [facility] acknowledges that she has consented to an online visit or consultation  She understands that the online visit is based solely on information provided by her, and that, in the absence of a face-to-face physical evaluation by the physician, the diagnosis she receives is both limited and provisional in terms of accuracy and completeness  This is not intended to replace a full medical face-to-face evaluation by the physician  Kelli Gutierrez understands and accepts these terms

## 2020-09-25 NOTE — PROGRESS NOTES
Assessment/Plan:      Problem List Items Addressed This Visit        Endocrine    Controlled type 2 diabetes mellitus without complication, without long-term current use of insulin (HCC)       Respiratory    Moderate persistent asthma with acute exacerbation      Other Visit Diagnoses     Acute bacterial bronchitis    -  Primary    Relevant Medications    predniSONE 20 mg tablet    azithromycin (ZITHROMAX) 250 mg tablet    Other Relevant Orders    XR chest pa & lateral (Completed)           Plan/Discussion:    Consistent with acute bacterial bronchitis with underlying mild asthma exacerbation  Treat with prednisone and zaithromycin  CXR done today is negative for pneumonia  Previous/recent covid testing is negative  However due to current symptoms/illness as well as potentially false negative I have advised that she quarantine for 10 days and not to go to work until improvement  Symtpoms started 9/20/2020  I will see her virtually for f/u next week  Subjective:   Chief Complaint   Patient presents with    Sore Throat    chest congestion    Shortness of Breath    Cough    Wheezing        Patient ID: Kylie Braxton is a 64 y o  female  Patient started with sytmpoms 9/20/2020  She had chest congestion, coughing, wheezing, sore thraot  No headaches, no ear pain, no anosmia  She does have shortness of breath on exertion  With feeling feverish and no chills  No known covid contacts or exposure  Was tested through Urgent care  Covid testing is negative  She continues to have sob, coughing ( worsening) RAMÍREZ, wheezing  Feeling feverish  Overall feels worse  Does have underlying asthma  Has been using her albuterol as needed with improvement               The following portions of the patient's history were reviewed and updated as appropriate: allergies, current medications, past family history, past medical history, past social history, past surgical history and problem list     Review of Systems   Constitutional: Positive for activity change and fatigue  Negative for appetite change, chills, diaphoresis and fever  HENT: Positive for congestion  Negative for facial swelling and sore throat  Respiratory: Positive for cough, shortness of breath and wheezing  Negative for apnea and chest tightness  Cardiovascular: Negative  Negative for chest pain and palpitations  Gastrointestinal: Negative  Negative for abdominal distention, abdominal pain, blood in stool, constipation, diarrhea and nausea  Genitourinary: Negative  Negative for difficulty urinating, dysuria, flank pain and frequency  Objective:  Vitals:    09/25/20 1012   Temp: 98 °F (36 7 °C)   Weight: 88 9 kg (196 lb)   Height: 5' 2" (1 575 m)     BP Readings from Last 6 Encounters:   07/08/20 136/82   06/10/20 128/82   05/28/20 114/62   05/20/20 108/60   05/13/20 130/74   11/07/19 128/82      Wt Readings from Last 6 Encounters:   09/25/20 88 9 kg (196 lb)   07/08/20 89 3 kg (196 lb 12 8 oz)   06/10/20 87 4 kg (192 lb 9 6 oz)   05/28/20 88 9 kg (196 lb)   05/22/20 88 5 kg (195 lb)   05/20/20 88 9 kg (196 lb)             Physical Exam  Vitals signs and nursing note reviewed  Constitutional:       General: She is not in acute distress  Appearance: She is well-developed  She is ill-appearing  She is not toxic-appearing  HENT:      Head: Normocephalic  Pulmonary:      Effort: Pulmonary effort is normal    Neurological:      Mental Status: She is alert             Admission on 05/28/2020, Discharged on 05/28/2020   Component Date Value Ref Range Status    ABO Grouping 05/28/2020 B   Final    Rh Factor 05/28/2020 Negative   Final    POC Glucose 05/28/2020 130  65 - 140 mg/dl Final    Case Report 05/28/2020    Final                    Value:Non-gynecologic Cytology                          Case: [id number]                                  Authorizing Provider:  Yazmin Palomares MD Collected:           05/28/2020 1423              Ordering Location:     [facility]        Received:            05/28/2020 [address] Operating Room                                                     Pathologist:           Preeti Maldonado MD                                                        Specimen:    Pelvic Washing                                                                             Final Diagnosis 05/28/2020    Final                    Value: This result contains rich text formatting which cannot be displayed here  Margarita Oliva Description 05/28/2020    Final                    Value: This result contains rich text formatting which cannot be displayed here   Additional Information 05/28/2020    Final                    Value: This result contains rich text formatting which cannot be displayed here   Case Report 05/28/2020    Final                    Value:Surgical Pathology Report                         Case: [id number]                                   Authorizing Provider:  Je Johnson MD        Collected:           05/28/2020 1429              Ordering Location:     [facility]        Received:            05/28/2020 [address] Operating Room                                                     Pathologist:           Preeti Maldonado MD                                                        Specimens:   A) - Lymph Node, [place], LEFT PELVIC SENTINEL LYMPH NODE                                          B) - Lymph Node, [place], RIGHT PELVIC SENTINEL LYMPH NODE                                         C) - Uterus w/Bilateral Ovaries and Fallopian Tubes, CERVIX included                       Addendum 05/28/2020    Final                    Value: This result contains rich text formatting which cannot be displayed here   Final Diagnosis 05/28/2020    Final                    Value: This result contains rich text formatting which cannot be displayed here   Additional Information 05/28/2020    Final                    Value: This result contains rich text formatting which cannot be displayed here   Synoptic Checklist 05/28/2020    Final                    Value:ENDOMETRIUM  (Uterus - All Specimens)                                                        8th Edition - Protocol posted: 8/28/2019                                                        SPECIMEN                               Procedure:     Total hysterectomy and bilateral salpingo-oophorectomy                                Hysterectomy Type:    Laparoscopic, robotic-assisted                                Specimen Integrity:    Intact                                                         TUMOR                               Tumor Site:    Endometrium                                Histologic Type:    Endometrioid carcinoma, NOS                                Histologic Grade:    FIGO grade 1                                Tumor Size:    Greatest Dimension (Centimeters): 1 9 cm                               Myometrial Invasion:    Not identified                                Adenomyosis:    Not identified                                Uterine Serosa Involvement:    Not identified                                Lower Uterine Segment Involvement:    Not identified                                Cervical Stromal Involvement:    Not identified                                Other Tissue / Organ Involvement:    Not identified                                Peritoneal Ascitic Fluid:    Not submitted / unknown                                Lymphovascular Invasion:    Not identified                                                         MARGINS                                                        LYMPH NODES                               :    All lymph nodes negative for tumor cells                                  Total Number of Pelvic Nodes Examined:    2                                  Number of Pelvic [place] Nodes Examined:    2                                  Total Number of Para-aortic Nodes Examined:    0                                                         PATHOLOGIC STAGE CLASSIFICATION (pTNM, AJCC 8th Edition)                               Primary Tumor (pT):    pT1a                                Regional Lymph Nodes Modifier:    (sn)                                Regional Lymph Nodes (pN):    pN0                                                         FIGO STAGE                               FIGO Stage:    IA                                                         ADDITIONAL FINDINGS                               Additional Findings:    None identified                                                         Comment(s)                             Comment(s):    MMR IHC testing will be performed and reported as an addendum       Gross Description 05/28/2020    Final                    Value: This result contains rich text formatting which cannot be displayed here      POC Glucose 05/28/2020 111  65 - 140 mg/dl Final   Lab Requisition on 05/27/2020   Component Date Value Ref Range Status    Case Report 05/27/2020    Final                    Value:Surgical Pathology Report                         Case: [id number]                                   Authorizing Provider:  Margaret Feliciano MD        Collected:           05/27/2020                   Ordering Location:     [address] [place] [address]      Received:            05/27/2020 [address] [place] [address] Specialty                                                                                  Laboratory                                                                   Pathologist:           Padma Thomas MD                                                                Specimen:    Endometrium, Endometrial Biopsy ( 1 slide [id number] [facility],collected on 5/13/2020)                                                                                 Final Diagnosis 05/27/2020    Final                    Value: This result contains rich text formatting which cannot be displayed here   Additional Information 05/27/2020    Final                    Value: This result contains rich text formatting which cannot be displayed here  [facility] Headings Description 05/27/2020    Final                    Value: This result contains rich text formatting which cannot be displayed here   Clinical Information 05/27/2020    Final                    Value:N95 0   Orders Only on 05/21/2020   Component Date Value Ref Range Status    SARS-CoV-2  05/21/2020 Not Detected  Not Detected Final    Comment: Testing was performed using the erica(R) SARS-CoV-2 test   This test was developed and its performance characteristics determined  by [employer]  This test has not been FDA cleared or  approved  This test has been authorized by FDA under an Emergency Use  Authorization (EUA)  This test is only authorized for the duration of  time the declaration that circumstances exist justifying the  authorization of the emergency use of in vitro diagnostic tests for  detection of SARS-CoV-2 virus and/or diagnosis of COVID-19 infection  under section 564(b)(1) of the Act, 21 U  S C  980SEU-0(S)(2), unless  the authorization is terminated or revoked sooner  When diagnostic testing is negative, the possibility of a false  negative result should be considered in the context of a patient's  recent exposures and the presence of clinical signs and symptoms  consistent with COVID-19  An individual without symptoms of COVID-19  and who is not shedding SARS-CoV-2 virus would expect to have                            a  negative (not detected) result in this assay     Hospital Outpatient Visit on 05/20/2020   Component Date Value Ref Range Status    ABO Grouping 05/20/2020 B   Final    Rh Factor 05/20/2020 Negative   Final    Antibody Screen 05/20/2020 Negative   Final    Specimen Expiration Date 05/20/2020 06009129   Corrected    This is a corrected result  Previous result was [id number] on 5/20/2020 at 2121 EDT   Office Visit on 05/20/2020   Component Date Value Ref Range Status    Ventricular Rate 05/20/2020 77  BPM Final    Atrial Rate 05/20/2020 77  BPM Final    OK Interval 05/20/2020 156  ms Final    QRSD Interval 05/20/2020 74  ms Final    QT Interval 05/20/2020 390  ms Final    QTC Interval 05/20/2020 441  ms Final    P Axis 05/20/2020 29  degrees Final    QRS Axis 05/20/2020 28  degrees Final    T Wave Axis 05/20/2020 20  degrees Final   Office Visit on 05/13/2020   Component Date Value Ref Range Status     AMB   05/13/2020 Comment   Corrected    Comment: Material submitted:                                           endometrium - ENDOMETRIAL BIOPSY      [facility]   05/13/2020 Comment   Corrected    Comment: Clinician provided ICD-10:  N95 0        05/13/2020 Comment   Corrected    Comment: **********************************************************************  Diagnosis:  ENDOMETRIAL BIOPSY:  ENDOMETRIOID ADENOCARCINOMA, FIGO GRADE 1, WITH MUCINOUS FEATURES  HAD  05/15/2020  1710 Local  **********************************************************************      [facility]   05/13/2020 Comment:   Corrected    The case has been reviewed by intradepartmental consultation   [facility]   05/13/2020 Comment   Final    Comment: Addendum:                                                          Results of the biopsy has been reported to [facility] via telephone by  Caitlin Glaser  on 5/18/20 @ 8:53 am and the report was faxed to [phone]  RLR/05/18/2020  Addendum Electronically Signed by Marybel Rolle MD, Pathologist        05/13/2020 Comment   Final    Comment: Electronically signed: Karrie Nissen Chryl Islam, MD, Pathologist        05/13/2020 Comment   Corrected Comment: Gross description:                                            1 Container, formalin-filled, labeled with patient identification  ENDOMETRIAL BIOPSY:  Received unlabeled as to source are multiple fragments of tan,  hemorrhagic material measuring 1 2 x 1 2 x 0 2 cm in aggregate  Submitted in toto in cassette(s) 1   /PER  05/14/2020  0636 Local        05/13/2020 Comment   Corrected    Comment: Pathologist provided ICD-10:  C54 1      [facility] AMB   05/13/2020 Comment   Corrected    Comment: CPT                                                            [id number]

## 2020-10-02 ENCOUNTER — TELEMEDICINE (OUTPATIENT)
Dept: FAMILY MEDICINE CLINIC | Facility: HOSPITAL | Age: 60
End: 2020-10-02
Payer: COMMERCIAL

## 2020-10-02 VITALS — WEIGHT: 194 LBS | HEIGHT: 62 IN | BODY MASS INDEX: 35.7 KG/M2 | TEMPERATURE: 98.6 F

## 2020-10-02 DIAGNOSIS — E11.9 CONTROLLED TYPE 2 DIABETES MELLITUS WITHOUT COMPLICATION, WITHOUT LONG-TERM CURRENT USE OF INSULIN (HCC): ICD-10-CM

## 2020-10-02 DIAGNOSIS — J20.8 ACUTE BACTERIAL BRONCHITIS: Primary | ICD-10-CM

## 2020-10-02 DIAGNOSIS — B96.89 ACUTE BACTERIAL BRONCHITIS: Primary | ICD-10-CM

## 2020-10-02 DIAGNOSIS — J45.41 MODERATE PERSISTENT ASTHMA WITH ACUTE EXACERBATION: ICD-10-CM

## 2020-10-02 PROCEDURE — 99214 OFFICE O/P EST MOD 30 MIN: CPT | Performed by: FAMILY MEDICINE

## 2020-11-23 DIAGNOSIS — E11.9 TYPE 2 DIABETES MELLITUS WITHOUT COMPLICATION, WITHOUT LONG-TERM CURRENT USE OF INSULIN (HCC): ICD-10-CM

## 2020-11-23 DIAGNOSIS — E78.1 ESSENTIAL HYPERTRIGLYCERIDEMIA: ICD-10-CM

## 2020-11-23 DIAGNOSIS — I10 ESSENTIAL HYPERTENSION: ICD-10-CM

## 2020-11-23 DIAGNOSIS — E03.8 OTHER SPECIFIED HYPOTHYROIDISM: ICD-10-CM

## 2020-11-24 DIAGNOSIS — E11.9 CONTROLLED TYPE 2 DIABETES MELLITUS WITHOUT COMPLICATION, WITHOUT LONG-TERM CURRENT USE OF INSULIN (HCC): Primary | ICD-10-CM

## 2020-11-24 DIAGNOSIS — E03.8 OTHER SPECIFIED HYPOTHYROIDISM: ICD-10-CM

## 2020-11-24 PROCEDURE — 4010F ACE/ARB THERAPY RXD/TAKEN: CPT | Performed by: FAMILY MEDICINE

## 2020-11-24 RX ORDER — LEVOTHYROXINE SODIUM 0.2 MG/1
TABLET ORAL
Qty: 18 TABLET | Refills: 0 | Status: SHIPPED | OUTPATIENT
Start: 2020-11-24 | End: 2021-01-12 | Stop reason: SDUPTHER

## 2020-11-24 RX ORDER — FENOFIBRATE 200 MG/1
CAPSULE ORAL
Qty: 30 CAPSULE | Refills: 0 | Status: SHIPPED | OUTPATIENT
Start: 2020-11-24 | End: 2021-03-01 | Stop reason: SDUPTHER

## 2020-11-24 RX ORDER — METFORMIN HYDROCHLORIDE 500 MG/1
TABLET, EXTENDED RELEASE ORAL
Qty: 60 TABLET | Refills: 0 | Status: SHIPPED | OUTPATIENT
Start: 2020-11-24 | End: 2021-01-12 | Stop reason: SDUPTHER

## 2020-11-24 RX ORDER — LISINOPRIL 20 MG/1
TABLET ORAL
Qty: 30 TABLET | Refills: 0 | Status: SHIPPED | OUTPATIENT
Start: 2020-11-24 | End: 2021-01-12 | Stop reason: SDUPTHER

## 2021-01-06 ENCOUNTER — OFFICE VISIT (OUTPATIENT)
Dept: GYNECOLOGIC ONCOLOGY | Facility: HOSPITAL | Age: 61
End: 2021-01-06
Payer: COMMERCIAL

## 2021-01-06 VITALS
DIASTOLIC BLOOD PRESSURE: 70 MMHG | SYSTOLIC BLOOD PRESSURE: 118 MMHG | BODY MASS INDEX: 35.33 KG/M2 | TEMPERATURE: 97.5 F | WEIGHT: 192 LBS | HEART RATE: 70 BPM | HEIGHT: 62 IN

## 2021-01-06 DIAGNOSIS — Z85.42 HISTORY OF ENDOMETRIAL CANCER: Primary | ICD-10-CM

## 2021-01-06 DIAGNOSIS — Z08 ENCOUNTER FOR FOLLOW-UP SURVEILLANCE OF ENDOMETRIAL CANCER: ICD-10-CM

## 2021-01-06 DIAGNOSIS — Z85.42 ENCOUNTER FOR FOLLOW-UP SURVEILLANCE OF ENDOMETRIAL CANCER: ICD-10-CM

## 2021-01-06 PROCEDURE — 99213 OFFICE O/P EST LOW 20 MIN: CPT | Performed by: PHYSICIAN ASSISTANT

## 2021-01-06 RX ORDER — TOFACITINIB 11 MG/1
TABLET, FILM COATED, EXTENDED RELEASE ORAL
COMMUNITY
Start: 2020-12-23 | End: 2021-07-27

## 2021-01-07 PROBLEM — Z08 ENCOUNTER FOR FOLLOW-UP SURVEILLANCE OF ENDOMETRIAL CANCER: Status: ACTIVE | Noted: 2021-01-07

## 2021-01-07 PROBLEM — Z85.42 ENCOUNTER FOR FOLLOW-UP SURVEILLANCE OF ENDOMETRIAL CANCER: Status: ACTIVE | Noted: 2021-01-07

## 2021-01-07 PROBLEM — Z85.42 HISTORY OF ENDOMETRIAL CANCER: Status: ACTIVE | Noted: 2020-05-20

## 2021-01-07 NOTE — ASSESSMENT & PLAN NOTE
History of stage IA, grade 1 endometrial cancer s/p surgical resection in June 2020  She is clinically without evidence of disease recurrence  She has dyspareunia  I discussed hyalogyn for lubrication as well as referral to pelvic rehab for evaluation of dyspareunia  At this time, she will first trial hyalogyn and call our office if she desires referral      Return to the office in 6 months for continued cancer surveillance

## 2021-01-07 NOTE — PROGRESS NOTES
Assessment/Plan:    Problem List Items Addressed This Visit        Other    History of endometrial cancer - Primary     History of stage IA, grade 1 endometrial cancer s/p surgical resection in June 2020  She is clinically without evidence of disease recurrence  She has dyspareunia  I discussed hyalogyn for lubrication as well as referral to pelvic rehab for evaluation of dyspareunia  At this time, she will first trial hyalogyn and call our office if she desires referral      Return to the office in 6 months for continued cancer surveillance  Encounter for follow-up surveillance of endometrial cancer            CHIEF COMPLAINT:   Endometrial cancer surveillance    Problem:  Cancer Staging  Endometrial cancer [facility])  Staging form: Corpus Uteri - Carcinoma, AJCC 8th Edition  - Clinical: FIGO Stage IA - Signed by Joann Wu PA-C on 6/10/2020        Previous therapy:  Oncology History   Endometrial cancer ([facility] [address] )   5/28/2020 Surgery    Robotic assisted TLH, BSO, sentinel LND and cystoscopy   A  Grade 1  B  No myometrial invasion, no LVSI  C  2 negative lymph nodes  D  IHC, all proteins intact  6/10/2020 -  Cancer Staged    Staging form: Corpus Uteri - Carcinoma, AJCC 8th Edition  - Clinical: FIGO Stage IA - Signed by Joann Wu PA-C on 6/10/2020  Histologic grade (G): G1  Histologic grading system: 3 grade system             Patient ID: Yoselyn Domínguez is a 64 y o  female  who presents to the office for endometrial cancer surveillance  Overall, the patient has been well in the interim  She denies vaginal bleeding or discharge  Normal bowel/bladder function  Appetite is appropriate  The patient notes dyspareunia  She notes pain with insertion as well as penetration  This has been present since her surgery  She notes being well lubricated         The following portions of the patient's history were reviewed and updated as appropriate: allergies, current medications, past medical history, past surgical history and problem list     Review of Systems   Constitutional: Negative  HENT: Negative  Eyes: Negative  Respiratory: Negative  Cardiovascular: Negative  Gastrointestinal: Negative  Genitourinary: Positive for dyspareunia  Negative for difficulty urinating, pelvic pain, vaginal bleeding and vaginal discharge  Musculoskeletal: Negative  Skin: Negative  Neurological: Negative  Psychiatric/Behavioral: Negative  Current Outpatient Medications   Medication Sig Dispense Refill    albuterol (PROVENTIL HFA,VENTOLIN HFA) 90 mcg/act inhaler Inhale 2 puffs every 4 (four) hours as needed for wheezing or shortness of breath 3 Inhaler 3    cholecalciferol (VITAMIN D3) 1,000 units tablet Take 1 tablet by mouth daily      COSENTYX SENSOREADY, 300 MG, 150 MG/ML SOAJ injection once a week To hold for surgery      escitalopram (LEXAPRO) 10 mg tablet Take 1 tablet (10 mg total) by mouth daily 30 tablet 1    fenofibrate micronized (LOFIBRA) 200 MG capsule Take 1 capsule by mouth once daily 30 capsule 0    levothyroxine 200 mcg tablet TAKE 1 TABLET BY MOUTH ONCE DAILY EXCEPT  FOR  ONE-HALF  TABLET  ONCE  WEEKLY  AS  DIRECTED 18 tablet 0    lisinopril (ZESTRIL) 20 mg tablet Take 1 tablet by mouth once daily 30 tablet 0    metFORMIN (GLUCOPHAGE-XR) 500 mg 24 hr tablet TAKE 2 TABLETS BY MOUTH IN THE MORNING 60 tablet 0    Xeljanz XR 11 MG TB24        No current facility-administered medications for this visit  Objective:    Blood pressure 118/70, pulse 70, temperature 97 5 °F (36 4 °C), temperature source Tympanic, height 5' 2" (1 575 m), weight 87 1 kg (192 lb), last menstrual period 04/20/2020, not currently breastfeeding  Body mass index is 35 12 kg/m²  Body surface area is 1 88 meters squared  Physical Exam  Vitals signs reviewed  Exam conducted with a chaperone present  Constitutional:       General: She is not in acute distress       Appearance: Normal appearance  She is not ill-appearing  HENT:      Head: Normocephalic and atraumatic  Mouth/Throat:      Mouth: Mucous membranes are moist    Eyes:      General:         Right eye: No discharge  Left eye: No discharge  Conjunctiva/sclera: Conjunctivae normal    Pulmonary:      Effort: Pulmonary effort is normal    Abdominal:      Palpations: Abdomen is soft  There is no mass  Tenderness: There is no abdominal tenderness  Hernia: No hernia is present  Genitourinary:     Comments: The external female genitalia is normal  The bartholin's, uretheral and skenes glands are normal  The urethral meatus is normal (midline with no lesions)  Anus without fissure or lesion  Speculum exam reveals a grossly normal vagina  No masses, lesions,discharge or bleeding  No significant cystocele or rectocele noted  Bimanual exam notes a surgical absent cervix, uterus and adnexal structures  No masses or fullness  Bladder is without fullness, mass or tenderness  Musculoskeletal:      Right lower leg: No edema  Left lower leg: No edema  Skin:     General: Skin is warm and dry  Coloration: Skin is not jaundiced  Findings: No rash  Neurological:      General: No focal deficit present  Mental Status: She is alert and oriented to person, place, and time  Cranial Nerves: No cranial nerve deficit  Sensory: No sensory deficit  Motor: No weakness  Gait: Gait normal    Psychiatric:         Mood and Affect: Mood normal          Behavior: Behavior normal          Thought Content:  Thought content normal          Judgment: Judgment normal

## 2021-01-12 ENCOUNTER — TELEPHONE (OUTPATIENT)
Dept: FAMILY MEDICINE CLINIC | Facility: HOSPITAL | Age: 61
End: 2021-01-12

## 2021-01-12 DIAGNOSIS — E11.9 TYPE 2 DIABETES MELLITUS WITHOUT COMPLICATION, WITHOUT LONG-TERM CURRENT USE OF INSULIN (HCC): ICD-10-CM

## 2021-01-12 DIAGNOSIS — I10 ESSENTIAL HYPERTENSION: ICD-10-CM

## 2021-01-12 DIAGNOSIS — E03.8 OTHER SPECIFIED HYPOTHYROIDISM: ICD-10-CM

## 2021-01-12 DIAGNOSIS — E11.9 CONTROLLED TYPE 2 DIABETES MELLITUS WITHOUT COMPLICATION, WITHOUT LONG-TERM CURRENT USE OF INSULIN (HCC): Primary | ICD-10-CM

## 2021-01-12 PROCEDURE — 4010F ACE/ARB THERAPY RXD/TAKEN: CPT | Performed by: INTERNAL MEDICINE

## 2021-01-12 RX ORDER — METFORMIN HYDROCHLORIDE 500 MG/1
TABLET, EXTENDED RELEASE ORAL
Qty: 60 TABLET | Refills: 0 | Status: SHIPPED | OUTPATIENT
Start: 2021-01-12 | End: 2021-02-03 | Stop reason: SDUPTHER

## 2021-01-12 RX ORDER — LEVOTHYROXINE SODIUM 0.2 MG/1
TABLET ORAL
Qty: 18 TABLET | Refills: 0 | Status: SHIPPED | OUTPATIENT
Start: 2021-01-12 | End: 2021-02-03 | Stop reason: SDUPTHER

## 2021-01-12 RX ORDER — LISINOPRIL 20 MG/1
20 TABLET ORAL DAILY
Qty: 30 TABLET | Refills: 0 | Status: SHIPPED | OUTPATIENT
Start: 2021-01-12 | End: 2021-02-03 | Stop reason: SDUPTHER

## 2021-01-19 ENCOUNTER — LAB (OUTPATIENT)
Dept: LAB | Facility: HOSPITAL | Age: 61
End: 2021-01-19
Payer: COMMERCIAL

## 2021-01-19 ENCOUNTER — TRANSCRIBE ORDERS (OUTPATIENT)
Dept: ADMINISTRATIVE | Facility: HOSPITAL | Age: 61
End: 2021-01-19

## 2021-01-19 DIAGNOSIS — E11.9 CONTROLLED TYPE 2 DIABETES MELLITUS WITHOUT COMPLICATION, WITHOUT LONG-TERM CURRENT USE OF INSULIN (HCC): ICD-10-CM

## 2021-01-19 DIAGNOSIS — Z00.00 ROUTINE GENERAL MEDICAL EXAMINATION AT A HEALTH CARE FACILITY: ICD-10-CM

## 2021-01-19 DIAGNOSIS — Z00.00 ROUTINE GENERAL MEDICAL EXAMINATION AT A HEALTH CARE FACILITY: Primary | ICD-10-CM

## 2021-01-19 LAB
ANION GAP SERPL CALCULATED.3IONS-SCNC: 4 MMOL/L (ref 4–13)
BUN SERPL-MCNC: 19 MG/DL (ref 5–25)
CALCIUM SERPL-MCNC: 10 MG/DL (ref 8.3–10.1)
CHLORIDE SERPL-SCNC: 111 MMOL/L (ref 100–108)
CO2 SERPL-SCNC: 27 MMOL/L (ref 21–32)
CREAT SERPL-MCNC: 0.86 MG/DL (ref 0.6–1.3)
EST. AVERAGE GLUCOSE BLD GHB EST-MCNC: 123 MG/DL
GFR SERPL CREATININE-BSD FRML MDRD: 75 ML/MIN/1.73SQ M
GLUCOSE P FAST SERPL-MCNC: 95 MG/DL (ref 65–99)
HBA1C MFR BLD: 5.9 %
POTASSIUM SERPL-SCNC: 4 MMOL/L (ref 3.5–5.3)
SODIUM SERPL-SCNC: 142 MMOL/L (ref 136–145)
TSH SERPL DL<=0.05 MIU/L-ACNC: 1.91 UIU/ML (ref 0.36–3.74)

## 2021-01-19 PROCEDURE — 83036 HEMOGLOBIN GLYCOSYLATED A1C: CPT | Performed by: INTERNAL MEDICINE

## 2021-01-19 PROCEDURE — 80048 BASIC METABOLIC PNL TOTAL CA: CPT

## 2021-01-19 PROCEDURE — 36415 COLL VENOUS BLD VENIPUNCTURE: CPT | Performed by: INTERNAL MEDICINE

## 2021-01-19 PROCEDURE — 3044F HG A1C LEVEL LT 7.0%: CPT | Performed by: INTERNAL MEDICINE

## 2021-01-19 PROCEDURE — 84443 ASSAY THYROID STIM HORMONE: CPT | Performed by: INTERNAL MEDICINE

## 2021-01-26 ENCOUNTER — OFFICE VISIT (OUTPATIENT)
Dept: FAMILY MEDICINE CLINIC | Facility: HOSPITAL | Age: 61
End: 2021-01-26
Payer: COMMERCIAL

## 2021-01-26 VITALS
TEMPERATURE: 97.1 F | HEIGHT: 62 IN | DIASTOLIC BLOOD PRESSURE: 70 MMHG | HEART RATE: 84 BPM | BODY MASS INDEX: 34.89 KG/M2 | SYSTOLIC BLOOD PRESSURE: 126 MMHG | WEIGHT: 189.6 LBS

## 2021-01-26 DIAGNOSIS — Z23 ENCOUNTER FOR IMMUNIZATION: ICD-10-CM

## 2021-01-26 DIAGNOSIS — E66.01 SEVERE OBESITY (BMI 35.0-39.9) WITH COMORBIDITY (HCC): ICD-10-CM

## 2021-01-26 DIAGNOSIS — F41.9 ANXIETY: ICD-10-CM

## 2021-01-26 DIAGNOSIS — E03.8 OTHER SPECIFIED HYPOTHYROIDISM: ICD-10-CM

## 2021-01-26 DIAGNOSIS — L40.9 PSORIASIS: ICD-10-CM

## 2021-01-26 DIAGNOSIS — J45.40 MODERATE PERSISTENT ASTHMA WITHOUT COMPLICATION: ICD-10-CM

## 2021-01-26 DIAGNOSIS — I10 ESSENTIAL HYPERTENSION: ICD-10-CM

## 2021-01-26 DIAGNOSIS — E78.1 ESSENTIAL HYPERTRIGLYCERIDEMIA: ICD-10-CM

## 2021-01-26 DIAGNOSIS — Z12.31 ENCOUNTER FOR SCREENING MAMMOGRAM FOR MALIGNANT NEOPLASM OF BREAST: ICD-10-CM

## 2021-01-26 DIAGNOSIS — E11.9 CONTROLLED TYPE 2 DIABETES MELLITUS WITHOUT COMPLICATION, WITHOUT LONG-TERM CURRENT USE OF INSULIN (HCC): ICD-10-CM

## 2021-01-26 DIAGNOSIS — Z85.42 HISTORY OF ENDOMETRIAL CANCER: Primary | ICD-10-CM

## 2021-01-26 PROCEDURE — 90472 IMMUNIZATION ADMIN EACH ADD: CPT | Performed by: INTERNAL MEDICINE

## 2021-01-26 PROCEDURE — 90732 PPSV23 VACC 2 YRS+ SUBQ/IM: CPT | Performed by: INTERNAL MEDICINE

## 2021-01-26 PROCEDURE — 3008F BODY MASS INDEX DOCD: CPT | Performed by: INTERNAL MEDICINE

## 2021-01-26 PROCEDURE — 99215 OFFICE O/P EST HI 40 MIN: CPT | Performed by: INTERNAL MEDICINE

## 2021-01-26 PROCEDURE — 3078F DIAST BP <80 MM HG: CPT | Performed by: INTERNAL MEDICINE

## 2021-01-26 PROCEDURE — 3074F SYST BP LT 130 MM HG: CPT | Performed by: INTERNAL MEDICINE

## 2021-01-26 PROCEDURE — 90682 RIV4 VACC RECOMBINANT DNA IM: CPT | Performed by: INTERNAL MEDICINE

## 2021-01-26 PROCEDURE — 90471 IMMUNIZATION ADMIN: CPT | Performed by: INTERNAL MEDICINE

## 2021-01-26 NOTE — ASSESSMENT & PLAN NOTE
No benefit with Cosentyx, on [facility] since end of Dec with little benefit - was told this is the last med to try, con't meds and f/u as per Derm, encouraged to keep hydrated

## 2021-01-26 NOTE — ASSESSMENT & PLAN NOTE
No current s/sx of exacerbation, was tx for exacerbation in Sept, COVID and CXR neg, con't rescue inhaler prn and call with new/worse symptoms, agreeable to flu and pneumonia vaccine today

## 2021-01-26 NOTE — ASSESSMENT & PLAN NOTE
Mood doing well off Lexapro - stopped Rx about 6-8 wks after starting, feels no meds are needed at this time - med list updated, call with new/worse mood, re-eval in 6 mos

## 2021-01-26 NOTE — PATIENT INSTRUCTIONS
Obesity   AMBULATORY CARE:   Obesity  is when your body mass index (BMI) is greater than 30  Your healthcare provider will use your height and weight to measure your BMI  The risks of obesity include  many health problems, such as injuries or physical disability  You may need tests to check for the following:  · Diabetes    · High blood pressure or high cholesterol    · Heart disease    · Gallbladder or liver disease    · Cancer of the colon, breast, prostate, liver, or kidney    · Sleep apnea    · Arthritis or gout    Seek care immediately if:   · You have a severe headache, confusion, or difficulty speaking  · You have weakness on one side of your body  · You have chest pain, sweating, or shortness of breath  Contact your healthcare provider if:   · You have symptoms of gallbladder or liver disease, such as pain in your upper abdomen  · You have knee or hip pain and discomfort while walking  · You have symptoms of diabetes, such as intense hunger and thirst, and frequent urination  · You have symptoms of sleep apnea, such as snoring or daytime sleepiness  · You have questions or concerns about your condition or care  Treatment for obesity  focuses on helping you lose weight to improve your health  Even a small decrease in BMI can reduce the risk for many health problems  Your healthcare provider will help you set a weight-loss goal   · Lifestyle changes  are the first step in treating obesity  These include making healthy food choices and getting regular physical activity  Your healthcare provider may suggest a weight-loss program that involves coaching, education, and therapy  · Medicine  may help you lose weight when it is used with a healthy diet and physical activity  · Surgery  can help you lose weight if you are very obese and have other health problems  There are several types of weight-loss surgery  Ask your healthcare provider for more information      Be successful losing weight:   · Set small, realistic goals  An example of a small goal is to walk for 20 minutes 5 days a week  Anther goal is to lose 5% of your body weight  · Tell friends, family members, and coworkers about your goals  and ask for their support  Ask a friend to lose weight with you, or join a weight-loss support group  · Identify foods or triggers that may cause you to overeat , and find ways to avoid them  Remove tempting high-calorie foods from your home and workplace  Place a bowl of fresh fruit on your kitchen counter  If stress causes you to eat, then find other ways to cope with stress  · Keep a diary to track what you eat and drink  Also write down how many minutes of physical activity you do each day  Weigh yourself once a week and record it in your diary  Eating changes: You will need to eat 500 to 1,000 fewer calories each day than you currently eat to lose 1 to 2 pounds a week  The following changes will help you cut calories:  · Eat smaller portions  Use small plates, no larger than 9 inches in diameter  Fill your plate half full of fruits and vegetables  Measure your food using measuring cups until you know what a serving size looks like  · Eat 3 meals and 1 or 2 snacks each day  Plan your meals in advance  Steve and eat at home most of the time  Eat slowly  Do not skip meals  Skipping meals can lead to overeating later in the day  This can make it harder for you to lose weight  Talk with a dietitian to help you make a meal plan and schedule that is right for you  · Eat fruits and vegetables at every meal   They are low in calories and high in fiber, which makes you feel full  Do not add butter, margarine, or cream sauce to vegetables  Use herbs to season steamed vegetables  · Eat less fat and fewer fried foods  Eat more baked or grilled chicken and fish  These protein sources are lower in calories and fat than red meat  Limit fast food   Dress your salads with olive oil and vinegar instead of bottled dressing  · Limit the amount of sugar you eat  Do not drink sugary beverages  Limit alcohol  Activity changes:  Physical activity is good for your body in many ways  It helps you burn calories and build strong muscles  It decreases stress and depression, and improves your mood  It can also help you sleep better  Talk to your healthcare provider before you begin an exercise program   · Exercise for at least 30 minutes 5 days a week  Start slowly  Set aside time each day for physical activity that you enjoy and that is convenient for you  It is best to do both weight training and an activity that increases your heart rate, such as walking, bicycling, or swimming  · Find ways to be more active  Do yard work and housecleaning  Walk up the stairs instead of using elevators  Spend your leisure time going to events that require walking, such as outdoor festivals or fairs  This extra physical activity can help you lose weight and keep it off  Follow up with your healthcare provider as directed: You may need to meet with a dietitian  Write down your questions so you remember to ask them during your visits  © Copyright [address] [place] [address] Drive Information is for End User's use only and may not be sold, redistributed or otherwise used for commercial purposes  All illustrations and images included in CareNotes® are the copyrighted property of A D A M [employer]  or [address] [place] [address]  The above information is an  only  It is not intended as medical advice for individual conditions or treatments  Talk to your doctor, nurse or pharmacist before following any medical regimen to see if it is safe and effective for you  Heart Healthy Diet   AMBULATORY CARE:   A heart healthy diet  is an eating plan low in unhealthy fats and sodium (salt)  The plan is high in healthy fats and fiber   A heart healthy diet helps improve your cholesterol levels and lowers your risk for heart disease and stroke  A dietitian will teach you how to read and understand food labels  Heart healthy diet guidelines to follow:   · Choose foods that contain healthy fats  ? Unsaturated fats  include monounsaturated and polyunsaturated fats  Unsaturated fat is found in foods such as soybean, canola, olive, corn, and safflower oils  It is also found in soft tub margarine that is made with liquid vegetable oil  ? Omega-3 fat  is found in certain fish, such as salmon, tuna, and trout, and in walnuts and flaxseed  Eat fish high in omega-3 fats at least 2 times a week  · Get 20 to 30 grams of fiber each day  Fruits, vegetables, whole-grain foods, and legumes (cooked beans) are good sources of fiber  · Limit or do not have unhealthy fats  ? Cholesterol  is found in animal foods, such as eggs and lobster, and in dairy products made from whole milk  Limit cholesterol to less than 200 mg each day  ? Saturated fat  is found in meats, such as sosa and hamburger  It is also found in chicken or turkey skin, whole milk, and butter  Limit saturated fat to less than 7% of your total daily calories  ? Trans fat  is found in packaged foods, such as potato chips and cookies  It is also in hard margarine, some fried foods, and shortening  Do not eat foods that contain trans fats  · Limit sodium as directed  You may be told to limit sodium to 2,000 to 2,300 mg each day  Choose low-sodium or no-salt-added foods  Add little or no salt to food you prepare  Use herbs and spices in place of salt  Include the following in your heart healthy plan:  Ask your dietitian or healthcare provider how many servings to have from each of the following food groups:  · Grains:      ? Whole-wheat breads, cereals, and pastas, and brown rice    ? Low-fat, low-sodium crackers and chips    · Vegetables:      ? Broccoli, green beans, green peas, and spinach    ? Collards, kale, and lima beans    ?  Carrots, sweet potatoes, tomatoes, and peppers    ? Canned vegetables with no salt added    · Fruits:      ? Bananas, peaches, pears, and pineapple    ? Grapes, raisins, and dates    ? Oranges, tangerines, grapefruit, orange juice, and grapefruit juice    ? Apricots, mangoes, melons, and papaya    ? Raspberries and strawberries    ? Canned fruit with no added sugar    · Low-fat dairy:      ? Nonfat (skim) milk, 1% milk, and low-fat almond, cashew, or soy milks fortified with calcium    ? Low-fat cheese, regular or frozen yogurt, and cottage cheese    · Meats and proteins:      ? Lean cuts of beef and pork (loin, leg, round), skinless chicken and turkey    ? Legumes, soy products, egg whites, or nuts    Limit or do not include the following in your heart healthy plan:   · Unhealthy fats and oils:      ? Whole or 2% milk, cream cheese, sour cream, or cheese    ? High-fat cuts of beef (T-bone steaks, ribs), chicken or turkey with skin, and organ meats such as liver    ? Butter, stick margarine, shortening, and cooking oils such as coconut or palm oil    · Foods and liquids high in sodium:      ? Packaged foods, such as frozen dinners, cookies, macaroni and cheese, and cereals with more than 300 mg of sodium per serving    ? Vegetables with added sodium, such as instant potatoes, vegetables with added sauces, or regular canned vegetables    ? Cured or smoked meats, such as hot dogs, sosa, and sausage    ? High-sodium ketchup, barbecue sauce, salad dressing, pickles, olives, soy sauce, or miso    · Foods and liquids high in sugar:      ? Candy, cake, cookies, pies, or doughnuts    ? Soft drinks (soda), sports drinks, or sweetened tea    ? Canned or dry mixes for cakes, soups, sauces, or gravies    Other healthy heart guidelines:   · Do not smoke  Nicotine and other chemicals in cigarettes and cigars can cause lung and heart damage  Ask your healthcare provider for information if you currently smoke and need help to quit   E-cigarettes or smokeless tobacco still contain nicotine  Talk to your healthcare provider before you use these products  · Limit or do not drink alcohol as directed  Alcohol can damage your heart and raise your blood pressure  Your healthcare provider may give you specific daily and weekly limits  The general recommended limit is 1 drink a day for women 21 or older and for men 72 or older  Do not have more than 3 drinks in a day or 7 in a week  The recommended limit is 2 drinks a day for men 24to 59years of age  Do not have more than 4 drinks in a day or 14 in a week  A drink of alcohol is 12 ounces of beer, 5 ounces of wine, or 1½ ounces of liquor  · Exercise regularly  Exercise can help you maintain a healthy weight and improve your blood pressure and cholesterol levels  Regular exercise can also decrease your risk for heart problems  Ask your healthcare provider about the best exercise plan for you  Do not start an exercise program without asking your healthcare provider  Follow up with your doctor or cardiologist as directed:  Write down your questions so you remember to ask them during your visits  © Copyright [address] Information is for End User's use only and may not be sold, redistributed or otherwise used for commercial purposes  All illustrations and images included in CareNotes® are the copyrighted property of A D A M , Inc  or [address] [place] [address]  The above information is an  only  It is not intended as medical advice for individual conditions or treatments  Talk to your doctor, nurse or pharmacist before following any medical regimen to see if it is safe and effective for you

## 2021-01-26 NOTE — ASSESSMENT & PLAN NOTE
Endometrial CA s/p PATITO BSO with final path c/w napoleon 1A dz, no adjuvant tx needed, con't f/u as per Gyn Onc, having some issues with dyspareunia - no great benefit with lubrication, advised to consider pelvic therapy as recommended by Gyn Onc

## 2021-01-26 NOTE — PROGRESS NOTES
Assessment/Plan:    History of endometrial cancer  Endometrial CA s/p PATITO BSO with final path c/w napoleon 1A dz, no adjuvant tx needed, con't f/u as per Gyn Onc, having some issues with dyspareunia - no great benefit with lubrication, advised to consider pelvic therapy as recommended by Gyn Onc    Moderate persistent asthma without complication  No current s/sx of exacerbation, was tx for exacerbation in Sept, COVID and CXR neg, con't rescue inhaler prn and call with new/worse symptoms, agreeable to flu and pneumonia vaccine today    Controlled type 2 diabetes mellitus without complication, without long-term current use of insulin ([facility] [address] )    Lab Results   Component Value Date    HGBA1C 5 9 (H) 01/19/2021   DM type 2 w/o complications - controlled with A1c 5 9- con't current Metformin as well as low sugar/carb diet and start exercise and get wgt down, BW q 6 mo - order given, foot exam done today, urged to do eye exam, on ACE but no statin    Other specified hypothyroidism  Clinically euthyroid and recent TFT's at goal, con't current levothyroxine    Essential hypertension  BP at goal, con't current meds, diet/exercise/wgt loss encouraged, recheck in 6 mos    Psoriasis  No benefit with Cosentyx, on Leopold Altus since end of Dec with little benefit - was told this is the last med to try, con't meds and f/u as per Derm, encouraged to keep hydrated    Anxiety  Mood doing well off Lexapro - stopped Rx about 6-8 wks after starting, feels no meds are needed at this time - med list updated, call with new/worse mood, re-eval in 6 mos    Essential hypertriglyceridemia  FLP due in July - order given, con't current fenofibrate for now, diet/exercise/wgt loss encouraged       Diagnoses and all orders for this visit:    History of endometrial cancer  -     Hemoglobin A1C; Future  -     CBC and differential; Future  -     Comprehensive metabolic panel; Future  -     Hemoglobin A1C; Future  -     Lipid panel;  Future  -     Microalbumin / creatinine urine ratio; Future    Moderate persistent asthma without complication  -     Hemoglobin A1C; Future  -     CBC and differential; Future  -     Comprehensive metabolic panel; Future  -     Hemoglobin A1C; Future  -     Lipid panel; Future  -     Microalbumin / creatinine urine ratio; Future    Controlled type 2 diabetes mellitus without complication, without long-term current use of insulin (HCC)  -     Hemoglobin A1C; Future  -     CBC and differential; Future  -     Comprehensive metabolic panel; Future  -     Hemoglobin A1C; Future  -     Lipid panel; Future  -     Microalbumin / creatinine urine ratio; Future    Other specified hypothyroidism  -     Hemoglobin A1C; Future  -     CBC and differential; Future  -     Comprehensive metabolic panel; Future  -     Hemoglobin A1C; Future  -     Lipid panel; Future  -     Microalbumin / creatinine urine ratio; Future    Essential hypertension  -     Hemoglobin A1C; Future  -     CBC and differential; Future  -     Comprehensive metabolic panel; Future  -     Hemoglobin A1C; Future  -     Lipid panel; Future  -     Microalbumin / creatinine urine ratio; Future    Psoriasis    Anxiety    Severe obesity (BMI 35 0-39  9) with comorbidity (HCC)    BMI 35 0-35 9,adult  Comments:  Diet/exercise/wgt loss encouraged    Essential hypertriglyceridemia  -     Lipid panel; Future    Encounter for screening mammogram for malignant neoplasm of breast  -     Mammo screening bilateral w 3d & cad; Future      Colonoscopy 9/14 - 5 yrs    Mammo 5/20    PAP s/p PATITO BSO    Flu and Pneumovax given today        Subjective:      Patient ID: Vibha Nunez is a 64 y o  female  HPI Pt here for follow up appt and BW results    Last visit 5/7/20 pt was noting postmenopausal bleeding and she was referred to GYN  Dr Clemencia Sauceda was messaged about issue and pt was seen for f/u by GYN  She had a pelvic US done in mid May and mild abnormal thickening of endometrium was noted   Pt saw GYN 5/13/20 and had a bx  Path came back + endometrial adenocarcinoma  Pt was referred to GYN Onc  She was seen by Zoila Burleson the end of May and subsequently had a robotic PATITO and BSO with pelvic node dissection on 5/28/20  Final path c/w stage 1A grade 1 endometrial cancer  No adjuvant treatment was needed  She saw Zoila Burleson for follow up in July 2020 as well as Jan 2021  She was referred to pelvic rehab for dyspareunia  Pt opted to try lubrication first      Was seen by Dr Sam Peralta in Sept for asthma exacerbation  She did test negative for COVID at that time  She was tx with abx and steroids  She had a CXR which was negative  She notes no issues with her asthma since then  She notes no cough/SOB/wheezing  She has not had to use her rescue inhaler since Sept      BW results from 1/29/21 were reviewed with pt in detail: FBS/A1C 95/5 9, rest of BMP and TSH were wnl  Def of controlled vs uncontrolled DM was reviewed  Diet was reviewed - wgt down 7 lbs from Nov 2019  She is trying to watch her sugars but does no formal exercise  She is taking her DM meds as directed  She is overdue for both her foot and eye exam   She is on an ACE but no statin  Pt is taking their thyroid medication daily w/o any other meds and prior to eating  They deny any significant wgt changes/fatigue/C/D/tremor/palp/hair loss or skin changes  BP at goal today and meds were reviewed and no changes have occurred  She denies missing doses of meds or SE with the meds  She does not check her BP outside the office  She notes no frequent Ha's/dizziness/double vision/CP  She was on Cosentyx for her psoriasis but it was stopped d/t lack of benefit  She was started on Zara Aaron the end of Dec and so far has had no benefit at all  Con't to get new spots and itches a lot  She is following with Derm and was told to call if no better  She stopped her Lexapro in June-July  She notes mood is doing well off the meds   She notes no significant down/sad mood and notes no significant anxious/irritable mood  Sleep is still up and down - wakes up mult times to use the bathroom  Colonoscopy 9/14 - 5 yrs    Mammo 5/20    PAP s/p PATITO BSO      Review of Systems   Constitutional: Negative for chills, fever and unexpected weight change  HENT: Negative for congestion and sore throat  Eyes: Negative for pain and visual disturbance  Respiratory: Negative for cough, shortness of breath and wheezing  Cardiovascular: Negative for chest pain and palpitations  Gastrointestinal: Negative for abdominal pain, constipation, diarrhea, nausea and vomiting  Endocrine: Negative for polydipsia and polyuria  Genitourinary: Negative for difficulty urinating and dysuria  Musculoskeletal: Negative for back pain and neck pain  Skin: Positive for rash  Negative for wound  Neurological: Negative for dizziness, light-headedness and headaches  Hematological: Negative for adenopathy  Psychiatric/Behavioral: Positive for sleep disturbance  Negative for behavioral problems, confusion and dysphoric mood  The patient is not nervous/anxious  Objective:    /70   Pulse 84   Temp (!) 97 1 °F (36 2 °C) (Temporal)   Ht 5' 1 5" (1 562 m)   Wt 86 kg (189 lb 9 6 oz)   LMP 04/20/2020   BMI 35 24 kg/m²      Physical Exam  Vitals signs and nursing note reviewed  Constitutional:       General: She is not in acute distress  Appearance: She is well-developed  She is obese  HENT:      Head: Normocephalic and atraumatic  Eyes:      General:         Right eye: No discharge  Left eye: No discharge  Conjunctiva/sclera: Conjunctivae normal    Neck:      Musculoskeletal: Neck supple  Trachea: No tracheal deviation  Cardiovascular:      Rate and Rhythm: Normal rate and regular rhythm  Pulses: no weak pulses          Dorsalis pedis pulses are 2+ on the right side and 2+ on the left side  Heart sounds: Normal heart sounds  No murmur   No friction rub  Pulmonary:      Effort: Pulmonary effort is normal  No respiratory distress  Breath sounds: Normal breath sounds  No wheezing, rhonchi or rales  Abdominal:      General: There is no distension  Palpations: Abdomen is soft  Tenderness: There is no abdominal tenderness  There is no guarding or rebound  Musculoskeletal:      Right lower leg: No edema  Left lower leg: No edema  Feet:    Feet:      Right foot:      Skin integrity: Callus and dry skin present  No ulcer, skin breakdown, erythema or warmth  Left foot:      Skin integrity: Callus and dry skin present  No ulcer, skin breakdown, erythema or warmth  Skin:     General: Skin is warm  Coloration: Skin is not pale  Comments: Diffuse psoriatic plaques noted   Neurological:      General: No focal deficit present  Mental Status: She is alert  Mental status is at baseline  Motor: No abnormal muscle tone  Gait: Gait normal    Psychiatric:         Mood and Affect: Mood normal          Behavior: Behavior normal          Thought Content: Thought content normal          Judgment: Judgment normal        Patient's shoes and socks removed  Right Foot/Ankle   Right Foot Inspection  Skin Exam: skin normal, skin intact, dry skin, callus and callus no warmth, no erythema, no maceration, no abnormal color, no pre-ulcer and no ulcer                          Toe Exam: ROM and strength within normal limits  Sensory   Vibration: intact    Monofilament testing: diminished  Vascular    The right DP pulse is 2+  Left Foot/Ankle  Left Foot Inspection  Skin Exam: skin normal, skin intact, dry skin and callusno warmth, no erythema, no maceration, normal color, no pre-ulcer and no ulcer                         Toe Exam: ROM and strength within normal limits                   Sensory   Vibration: intact    Monofilament: diminished  Vascular    The left DP pulse is 2+     Assign Risk Category:  No deformity present; No loss of protective sensation; No weak pulses       Risk: 0        BMI Counseling: Body mass index is 35 24 kg/m²  The BMI is above normal  Nutrition recommendations include reducing portion sizes, 3-5 servings of fruits/vegetables daily, consuming healthier snacks, moderation in carbohydrate intake, increasing intake of lean protein and reducing intake of saturated fat and trans fat  Exercise recommendations include exercising 3-5 times per week

## 2021-01-26 NOTE — ASSESSMENT & PLAN NOTE
Lab Results   Component Value Date    HGBA1C 5 9 (H) 01/19/2021   DM type 2 w/o complications - controlled with A1c 5 9- con't current Metformin as well as low sugar/carb diet and start exercise and get wgt down, BW q 6 mo - order given, foot exam done today, urged to do eye exam, on ACE but no statin

## 2021-02-03 DIAGNOSIS — I10 ESSENTIAL HYPERTENSION: ICD-10-CM

## 2021-02-03 DIAGNOSIS — E03.8 OTHER SPECIFIED HYPOTHYROIDISM: ICD-10-CM

## 2021-02-03 DIAGNOSIS — E11.9 TYPE 2 DIABETES MELLITUS WITHOUT COMPLICATION, WITHOUT LONG-TERM CURRENT USE OF INSULIN (HCC): ICD-10-CM

## 2021-02-03 PROCEDURE — 4010F ACE/ARB THERAPY RXD/TAKEN: CPT | Performed by: PHYSICIAN ASSISTANT

## 2021-02-03 RX ORDER — LEVOTHYROXINE SODIUM 0.2 MG/1
TABLET ORAL
Qty: 90 TABLET | Refills: 2 | Status: SHIPPED | OUTPATIENT
Start: 2021-02-03 | End: 2022-04-10

## 2021-02-03 RX ORDER — METFORMIN HYDROCHLORIDE 500 MG/1
TABLET, EXTENDED RELEASE ORAL
Qty: 180 TABLET | Refills: 2 | Status: SHIPPED | OUTPATIENT
Start: 2021-02-03 | End: 2021-07-27 | Stop reason: SDUPTHER

## 2021-02-03 RX ORDER — LISINOPRIL 20 MG/1
20 TABLET ORAL DAILY
Qty: 90 TABLET | Refills: 2 | Status: SHIPPED | OUTPATIENT
Start: 2021-02-03 | End: 2021-07-27 | Stop reason: SDUPTHER

## 2021-03-01 DIAGNOSIS — E78.1 ESSENTIAL HYPERTRIGLYCERIDEMIA: ICD-10-CM

## 2021-03-01 RX ORDER — FENOFIBRATE 200 MG/1
200 CAPSULE ORAL DAILY
Qty: 90 CAPSULE | Refills: 1 | Status: SHIPPED | OUTPATIENT
Start: 2021-03-01 | End: 2021-11-19 | Stop reason: SDUPTHER

## 2021-03-10 DIAGNOSIS — Z23 ENCOUNTER FOR IMMUNIZATION: ICD-10-CM

## 2021-03-15 ENCOUNTER — APPOINTMENT (OUTPATIENT)
Dept: RADIOLOGY | Facility: CLINIC | Age: 61
End: 2021-03-15
Payer: COMMERCIAL

## 2021-03-15 ENCOUNTER — OCCMED (OUTPATIENT)
Dept: URGENT CARE | Facility: CLINIC | Age: 61
End: 2021-03-15
Payer: OTHER MISCELLANEOUS

## 2021-03-15 DIAGNOSIS — M54.2 NECK PAIN: ICD-10-CM

## 2021-03-15 DIAGNOSIS — M54.2 NECK PAIN: Primary | ICD-10-CM

## 2021-03-15 PROCEDURE — 99283 EMERGENCY DEPT VISIT LOW MDM: CPT | Performed by: PHYSICIAN ASSISTANT

## 2021-03-15 PROCEDURE — 72040 X-RAY EXAM NECK SPINE 2-3 VW: CPT

## 2021-03-15 PROCEDURE — G0382 LEV 3 HOSP TYPE B ED VISIT: HCPCS | Performed by: PHYSICIAN ASSISTANT

## 2021-03-18 ENCOUNTER — OCCMED (OUTPATIENT)
Dept: URGENT CARE | Facility: CLINIC | Age: 61
End: 2021-03-18
Payer: OTHER MISCELLANEOUS
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
## 2021-03-18 PROCEDURE — 99213 OFFICE O/P EST LOW 20 MIN: CPT

## 2021-04-17 NOTE — PROGRESS NOTES
ASSESSMENT/PLAN:    Assessment:   R long TF - resolved  R hand intrinsic tightness    Plan:   Explained that the TF has resolved, but she now has intrinsic tightness  Explained that this should improve with specialized therapy    Follow Up:  6  week(s)      _____________________________________________________  CHIEF COMPLAINT:  Chief Complaint   Patient presents with    Right Middle Finger - Follow-up         SUBJECTIVE:  Khloe Kang is a 64 y o  female who presents for follow up regarding R long TF  Pt states it is not clicking/locking but she continues to have pain and stiffness  She states she has not had much relief with splinting       PAST MEDICAL HISTORY:  Past Medical History:   Diagnosis Date    Anxiety     Asthma     Disease of thyroid gland     Hypertension     Psoriasis        PAST SURGICAL HISTORY:  Past Surgical History:   Procedure Laterality Date     SECTION, LOW TRANSVERSE      COLONOSCOPY      colo 16 - polyp at sigmoid colon - 6 yr if adenoma or 10 yr if hyperplastic pathology: early hyperplastic changes     GALLBLADDER SURGERY      LAPAROSCOPY FOR ECTOPIC PREGNANCY      With excision    DE WRIST Luis Carlos Revering LIG Right 2019    Procedure: RELEASE CARPAL TUNNEL ENDOSCOPIC;  Surgeon: Wendi Rock MD;  Location: [facility];  Service: Orthopedics    DE WRIST Luis Carlos Revering LIG Left 2019    Procedure: RELEASE CARPAL TUNNEL ENDOSCOPIC;  Surgeon: Wendi Rock MD;  Location:  MAIN OR;  Service: Orthopedics    THYROID SURGERY      WRIST SURGERY         FAMILY HISTORY:  Family History   Problem Relation Age of Onset    Asthma Mother     Diabetes Father     Hypertension Father     Hypertension Family     Neuropathy Family     Stroke Family     Thyroid disease Family     No Known Problems Sister     No Known Problems Sister     No Known Problems Maternal Aunt     No Known Problems Maternal Aunt     No Known Problems Maternal Aunt     No Known Problems Maternal Aunt     No Known Problems Maternal Aunt     No Known Problems Paternal Aunt     No Known Problems Paternal Aunt        SOCIAL HISTORY:  Social History     Tobacco Use    Smoking status: Current Every Day Smoker     Packs/day: 0 25     Years: 30 00     Pack years: 7 50     Types: Cigarettes    Smokeless tobacco: Never Used   Substance Use Topics    Alcohol use:  Yes     Alcohol/week: 2 0 standard drinks     Types: 2 Cans of beer per week     Drinks per session: 1 or 2     Binge frequency: Less than monthly     Comment: Social drinker    Drug use: No       MEDICATIONS:    Current Outpatient Medications:     acetaminophen (TYLENOL 8 HOUR) 650 mg CR tablet, Take 1 tablet (650 mg total) by mouth every 8 (eight) hours, Disp: 15 tablet, Rfl: 0    albuterol (PROVENTIL HFA,VENTOLIN HFA) 90 mcg/act inhaler, Inhale 2 puffs every 4 (four) hours as needed for wheezing or shortness of breath, Disp: 3 Inhaler, Rfl: 3    cholecalciferol (VITAMIN D3) 1,000 units tablet, Take 1 tablet by mouth daily, Disp: , Rfl:     fenofibrate micronized (LOFIBRA) 200 MG capsule, Take 1 capsule (200 mg total) by mouth daily for 124 days, Disp: 90 capsule, Rfl: 1    fluticasone-salmeterol (AIRDUO RESPICLICK) 99-03 mcg/act dry powder inhaler, INHALE 1 PUFF TWICE DAILY, Disp: 1 each, Rfl: 1    levothyroxine 200 mcg tablet, 1 tab PO q day EXCEPT 1/2 tab once weekly, Disp: 90 tablet, Rfl: 2    lisinopril (ZESTRIL) 20 mg tablet, Take 1 tablet (20 mg total) by mouth daily, Disp: 90 tablet, Rfl: 2    metFORMIN (GLUCOPHAGE-XR) 500 mg 24 hr tablet, Take 3 tablets (1,500 mg total) by mouth daily with breakfast, Disp: 90 tablet, Rfl: 2    naproxen sodium (ALEVE) 220 MG tablet, Take 1 tablet (220 mg total) by mouth 2 (two) times a day with meals for 5 days, Disp: 10 tablet, Rfl: 0    Brodalumab (SILIQ SC), Inject 1 Syringe under the skin once a week, Disp: , Rfl:     ALLERGIES:  Allergies   Allergen side rails up/wait time explained/warm blanket provided Reactions    Methimazole Arthralgia     Reaction Date: [id number]; Annotation - [id number]: Joint pain       REVIEW OF SYSTEMS:  Pertinent items are noted in HPI  A comprehensive review of systems was negative  LABS:  HgA1c:   Lab Results   Component Value Date    HGBA1C 6 1 08/02/2019     BMP:   Lab Results   Component Value Date    CALCIUM 9 6 08/02/2019    K 3 5 08/02/2019    CO2 29 08/02/2019     08/02/2019    BUN 20 08/02/2019    CREATININE 1 02 08/02/2019           _____________________________________________________  PHYSICAL EXAMINATION:  Vital signs: /85   Pulse 96   Ht 5' 3" (1 6 m)   Wt 89 8 kg (198 lb)   BMI 35 07 kg/m²   General: well developed and well nourished, alert, oriented times 3 and appears comfortable  Psychiatric: Normal  HEENT: Trachea Midline, No torticollis  Cardiovascular: No discernable arrhythmia  Pulmonary: No wheezing or stridor  Skin: No masses, erythema, lacerations, fluctation, ulcerations  Neurovascular: Sensation Intact to the Median, Ulnar, Radial Nerve, Motor Intact to the Median, Ulnar, Radial Nerve and Pulses Intact    MUSCULOSKELETAL EXAMINATION:  RIGHT SIDE:  Finger:  + edema of the finger  No ttp A1 pulley and no clicking on exam  However, pt with + Walt testing of the finger       _____________________________________________________  STUDIES REVIEWED:  No Studies to review      PROCEDURES PERFORMED:  Procedures  No Procedures performed today   Scribe Attestation    I,:   Marty Hamman, PA-C am acting as a scribe while in the presence of the attending physician :        I,:   Carol Sumner MD personally performed the services described in this documentation    as scribed in my presence :

## 2021-05-25 ENCOUNTER — HOSPITAL ENCOUNTER (OUTPATIENT)
Dept: MAMMOGRAPHY | Facility: IMAGING CENTER | Age: 61
Discharge: HOME | End: 2021-05-25
Payer: COMMERCIAL

## 2021-05-25 DIAGNOSIS — Z12.31 ENCOUNTER FOR SCREENING MAMMOGRAM FOR MALIGNANT NEOPLASM OF BREAST: ICD-10-CM

## 2021-05-25 PROCEDURE — 77067 SCR MAMMO BI INCL CAD: CPT

## 2021-05-25 PROCEDURE — 77063 BREAST TOMOSYNTHESIS BI: CPT

## 2021-06-25 ENCOUNTER — TELEPHONE (OUTPATIENT)
Dept: GYNECOLOGIC ONCOLOGY | Facility: CLINIC | Age: 61
End: 2021-06-25

## 2021-06-25 NOTE — TELEPHONE ENCOUNTER
Left message for patient to reschedule appointment on 7/7/21 with [facility] to a different day in [facility] due to provider OOO

## 2021-07-20 ENCOUNTER — APPOINTMENT (OUTPATIENT)
Dept: LAB | Facility: HOSPITAL | Age: 61
End: 2021-07-20
Payer: COMMERCIAL

## 2021-07-20 DIAGNOSIS — I10 ESSENTIAL HYPERTENSION: ICD-10-CM

## 2021-07-20 DIAGNOSIS — E03.8 OTHER SPECIFIED HYPOTHYROIDISM: ICD-10-CM

## 2021-07-20 DIAGNOSIS — E78.1 ESSENTIAL HYPERTRIGLYCERIDEMIA: ICD-10-CM

## 2021-07-20 DIAGNOSIS — J45.40 MODERATE PERSISTENT ASTHMA WITHOUT COMPLICATION: ICD-10-CM

## 2021-07-20 DIAGNOSIS — Z85.42 HISTORY OF ENDOMETRIAL CANCER: ICD-10-CM

## 2021-07-20 DIAGNOSIS — E11.9 CONTROLLED TYPE 2 DIABETES MELLITUS WITHOUT COMPLICATION, WITHOUT LONG-TERM CURRENT USE OF INSULIN (HCC): ICD-10-CM

## 2021-07-20 LAB
ALBUMIN SERPL BCP-MCNC: 3.8 G/DL (ref 3.5–5)
ALP SERPL-CCNC: 89 U/L (ref 46–116)
ALT SERPL W P-5'-P-CCNC: 41 U/L (ref 12–78)
ANION GAP SERPL CALCULATED.3IONS-SCNC: 4 MMOL/L (ref 4–13)
AST SERPL W P-5'-P-CCNC: 22 U/L (ref 5–45)
BASOPHILS # BLD AUTO: 0.07 THOUSANDS/ΜL (ref 0–0.1)
BASOPHILS NFR BLD AUTO: 1 % (ref 0–1)
BILIRUB SERPL-MCNC: 0.63 MG/DL (ref 0.2–1)
BUN SERPL-MCNC: 19 MG/DL (ref 5–25)
CALCIUM SERPL-MCNC: 9.3 MG/DL (ref 8.3–10.1)
CHLORIDE SERPL-SCNC: 110 MMOL/L (ref 100–108)
CHOLEST SERPL-MCNC: 184 MG/DL (ref 50–200)
CO2 SERPL-SCNC: 26 MMOL/L (ref 21–32)
CREAT SERPL-MCNC: 0.82 MG/DL (ref 0.6–1.3)
CREAT UR-MCNC: 119 MG/DL
EOSINOPHIL # BLD AUTO: 0.45 THOUSAND/ΜL (ref 0–0.61)
EOSINOPHIL NFR BLD AUTO: 9 % (ref 0–6)
ERYTHROCYTE [DISTWIDTH] IN BLOOD BY AUTOMATED COUNT: 12.8 % (ref 11.6–15.1)
EST. AVERAGE GLUCOSE BLD GHB EST-MCNC: 120 MG/DL
GFR SERPL CREATININE-BSD FRML MDRD: 79 ML/MIN/1.73SQ M
GLUCOSE P FAST SERPL-MCNC: 92 MG/DL (ref 65–99)
HBA1C MFR BLD: 5.8 %
HCT VFR BLD AUTO: 45.4 % (ref 34.8–46.1)
HDLC SERPL-MCNC: 39 MG/DL
HGB BLD-MCNC: 14.9 G/DL (ref 11.5–15.4)
IMM GRANULOCYTES # BLD AUTO: 0.03 THOUSAND/UL (ref 0–0.2)
IMM GRANULOCYTES NFR BLD AUTO: 1 % (ref 0–2)
LDLC SERPL CALC-MCNC: 116 MG/DL (ref 0–100)
LYMPHOCYTES # BLD AUTO: 2 THOUSANDS/ΜL (ref 0.6–4.47)
LYMPHOCYTES NFR BLD AUTO: 39 % (ref 14–44)
MCH RBC QN AUTO: 31.5 PG (ref 26.8–34.3)
MCHC RBC AUTO-ENTMCNC: 32.8 G/DL (ref 31.4–37.4)
MCV RBC AUTO: 96 FL (ref 82–98)
MICROALBUMIN UR-MCNC: 48.7 MG/L (ref 0–20)
MICROALBUMIN/CREAT 24H UR: 41 MG/G CREATININE (ref 0–30)
MONOCYTES # BLD AUTO: 0.5 THOUSAND/ΜL (ref 0.17–1.22)
MONOCYTES NFR BLD AUTO: 10 % (ref 4–12)
NEUTROPHILS # BLD AUTO: 2.02 THOUSANDS/ΜL (ref 1.85–7.62)
NEUTS SEG NFR BLD AUTO: 40 % (ref 43–75)
NONHDLC SERPL-MCNC: 145 MG/DL
NRBC BLD AUTO-RTO: 0 /100 WBCS
PLATELET # BLD AUTO: 265 THOUSANDS/UL (ref 149–390)
PMV BLD AUTO: 10.8 FL (ref 8.9–12.7)
POTASSIUM SERPL-SCNC: 4.6 MMOL/L (ref 3.5–5.3)
PROT SERPL-MCNC: 8.4 G/DL (ref 6.4–8.2)
RBC # BLD AUTO: 4.73 MILLION/UL (ref 3.81–5.12)
SODIUM SERPL-SCNC: 140 MMOL/L (ref 136–145)
TRIGL SERPL-MCNC: 144 MG/DL
WBC # BLD AUTO: 5.07 THOUSAND/UL (ref 4.31–10.16)

## 2021-07-20 PROCEDURE — 82570 ASSAY OF URINE CREATININE: CPT

## 2021-07-20 PROCEDURE — 3044F HG A1C LEVEL LT 7.0%: CPT | Performed by: INTERNAL MEDICINE

## 2021-07-20 PROCEDURE — 85025 COMPLETE CBC W/AUTO DIFF WBC: CPT

## 2021-07-20 PROCEDURE — 82043 UR ALBUMIN QUANTITATIVE: CPT

## 2021-07-20 PROCEDURE — 80053 COMPREHEN METABOLIC PANEL: CPT

## 2021-07-20 PROCEDURE — 83036 HEMOGLOBIN GLYCOSYLATED A1C: CPT

## 2021-07-20 PROCEDURE — 3060F POS MICROALBUMINURIA REV: CPT | Performed by: INTERNAL MEDICINE

## 2021-07-20 PROCEDURE — 80061 LIPID PANEL: CPT

## 2021-07-20 PROCEDURE — 36415 COLL VENOUS BLD VENIPUNCTURE: CPT

## 2021-07-21 ENCOUNTER — OFFICE VISIT (OUTPATIENT)
Dept: GYNECOLOGIC ONCOLOGY | Facility: HOSPITAL | Age: 61
End: 2021-07-21
Payer: COMMERCIAL

## 2021-07-21 VITALS
WEIGHT: 192 LBS | SYSTOLIC BLOOD PRESSURE: 128 MMHG | HEART RATE: 81 BPM | BODY MASS INDEX: 35.33 KG/M2 | HEIGHT: 62 IN | RESPIRATION RATE: 18 BRPM | DIASTOLIC BLOOD PRESSURE: 76 MMHG

## 2021-07-21 DIAGNOSIS — Z85.42 HISTORY OF ENDOMETRIAL CANCER: Primary | ICD-10-CM

## 2021-07-21 DIAGNOSIS — Z08 ENCOUNTER FOR FOLLOW-UP SURVEILLANCE OF ENDOMETRIAL CANCER: ICD-10-CM

## 2021-07-21 DIAGNOSIS — Z85.42 ENCOUNTER FOR FOLLOW-UP SURVEILLANCE OF ENDOMETRIAL CANCER: ICD-10-CM

## 2021-07-21 PROCEDURE — 3074F SYST BP LT 130 MM HG: CPT | Performed by: PHYSICIAN ASSISTANT

## 2021-07-21 PROCEDURE — 99212 OFFICE O/P EST SF 10 MIN: CPT | Performed by: PHYSICIAN ASSISTANT

## 2021-07-21 PROCEDURE — 3078F DIAST BP <80 MM HG: CPT | Performed by: PHYSICIAN ASSISTANT

## 2021-07-21 RX ORDER — IXEKIZUMAB 80 MG/ML
INJECTION, SOLUTION SUBCUTANEOUS
COMMUNITY
Start: 2021-07-07

## 2021-07-21 NOTE — ASSESSMENT & PLAN NOTE
History of stage IA, grade 1 endometrial cancer s/p surgical resection in June 2020  She is clinically without evidence of disease recurrence        Return to the office in 6 months for continued cancer surveillance

## 2021-07-21 NOTE — PROGRESS NOTES
Assessment/Plan:    Problem List Items Addressed This Visit        Other    History of endometrial cancer - Primary     History of stage IA, grade 1 endometrial cancer s/p surgical resection in June 2020  She is clinically without evidence of disease recurrence        Return to the office in 6 months for continued cancer surveillance  Encounter for follow-up surveillance of endometrial cancer            CHIEF COMPLAINT:   Endometrial cancer surveillance    Problem:  Cancer Staging  History of endometrial cancer  Staging form: Corpus Uteri - Carcinoma, AJCC 8th Edition  - Clinical: FIGO Stage IA - Signed by Nancy Delgado PA-C on 6/10/2020        Previous therapy:  Oncology History   History of endometrial cancer   5/28/2020 Surgery    Robotic assisted TLH, BSO, sentinel LND and cystoscopy   A  Grade 1  B  No myometrial invasion, no LVSI  C  2 negative lymph nodes  D  IHC, all proteins intact  6/10/2020 -  Cancer Staged    Staging form: Corpus Uteri - Carcinoma, AJCC 8th Edition  - Clinical: FIGO Stage IA - Signed by Nancy Delgado PA-C on 6/10/2020  Histologic grade (G): G1  Histologic grading system: 3 grade system             Patient ID: Sean Huynh is a 64 y o  female  who has no new complaints today  No vaginal bleeding, abdominal/pelvic pain  Normal bowel and bladder function  The patient notes stable dyspareunia  She does not desire treatment  No interval change in medical history since last visit  Quality of life is good  The following portions of the patient's history were reviewed and updated as appropriate: allergies, current medications, past medical history, past surgical history and problem list     Review of Systems   Constitutional: Negative  HENT: Negative  Eyes: Negative  Respiratory: Negative  Cardiovascular: Negative  Gastrointestinal: Negative  Genitourinary: Positive for dyspareunia  Negative for pelvic pain, vaginal bleeding and vaginal discharge  Musculoskeletal: Negative  Skin: Negative  Neurological: Negative  Psychiatric/Behavioral: Negative  Current Outpatient Medications   Medication Sig Dispense Refill    albuterol (PROVENTIL HFA,VENTOLIN HFA) 90 mcg/act inhaler Inhale 2 puffs every 4 (four) hours as needed for wheezing or shortness of breath 3 Inhaler 3    cholecalciferol (VITAMIN D3) 1,000 units tablet Take 1 tablet by mouth daily      fenofibrate micronized (LOFIBRA) 200 MG capsule Take 1 capsule (200 mg total) by mouth daily 90 capsule 1    levothyroxine 200 mcg tablet TAKE 1 TABLET BY MOUTH ONCE DAILY EXCEPT  FOR  ONE-HALF  TABLET  ONCE  WEEKLY  AS  DIRECTED 90 tablet 2    lisinopril (ZESTRIL) 20 mg tablet Take 1 tablet (20 mg total) by mouth daily 90 tablet 2    metFORMIN (GLUCOPHAGE-XR) 500 mg 24 hr tablet TAKE 2 TABLETS BY MOUTH IN THE MORNING 180 tablet 2    Taltz 80 MG/ML SOAJ       Xeljanz XR 11 MG TB24        No current facility-administered medications for this visit  Objective:    Blood pressure 128/76, pulse 81, resp  rate 18, height 5' 1 5" (1 562 m), weight 87 1 kg (192 lb), last menstrual period 04/20/2020, not currently breastfeeding  Body mass index is 35 69 kg/m²  Body surface area is 1 87 meters squared  Physical Exam  Vitals reviewed  Exam conducted with a chaperone present  Constitutional:       General: She is not in acute distress  Appearance: Normal appearance  She is not ill-appearing  HENT:      Head: Normocephalic and atraumatic  Mouth/Throat:      Mouth: Mucous membranes are moist    Eyes:      General:         Right eye: No discharge  Left eye: No discharge  Conjunctiva/sclera: Conjunctivae normal    Pulmonary:      Effort: Pulmonary effort is normal    Abdominal:      Palpations: Abdomen is soft  There is no mass  Tenderness: There is no abdominal tenderness  Hernia: No hernia is present  Genitourinary:     Comments:  The external female genitalia is normal  The bartholin's, uretheral and skenes glands are normal  The urethral meatus is normal (midline with no lesions)  Anus without fissure or lesion  Speculum exam reveals a grossly normal vagina  No masses, lesions,discharge or bleeding  No significant cystocele or rectocele noted  Bimanual exam notes a surgical absent cervix, uterus and adnexal structures  No masses or fullness  Bladder is without fullness, mass or tenderness  Musculoskeletal:      Right lower leg: No edema  Left lower leg: No edema  Skin:     General: Skin is warm and dry  Coloration: Skin is not jaundiced  Findings: No rash  Neurological:      General: No focal deficit present  Mental Status: She is alert and oriented to person, place, and time  Cranial Nerves: No cranial nerve deficit  Sensory: No sensory deficit  Motor: No weakness  Gait: Gait normal    Psychiatric:         Mood and Affect: Mood normal          Behavior: Behavior normal          Thought Content:  Thought content normal          Judgment: Judgment normal

## 2021-07-27 ENCOUNTER — OFFICE VISIT (OUTPATIENT)
Dept: FAMILY MEDICINE CLINIC | Facility: HOSPITAL | Age: 61
End: 2021-07-27
Payer: COMMERCIAL

## 2021-07-27 VITALS
SYSTOLIC BLOOD PRESSURE: 136 MMHG | HEIGHT: 62 IN | BODY MASS INDEX: 35.96 KG/M2 | HEART RATE: 88 BPM | WEIGHT: 195.4 LBS | TEMPERATURE: 97 F | DIASTOLIC BLOOD PRESSURE: 84 MMHG

## 2021-07-27 DIAGNOSIS — E11.29 MICROALBUMINURIA DUE TO TYPE 2 DIABETES MELLITUS (HCC): ICD-10-CM

## 2021-07-27 DIAGNOSIS — Z85.42 HISTORY OF ENDOMETRIAL CANCER: ICD-10-CM

## 2021-07-27 DIAGNOSIS — E78.1 ESSENTIAL HYPERTRIGLYCERIDEMIA: ICD-10-CM

## 2021-07-27 DIAGNOSIS — E78.5 DYSLIPIDEMIA: ICD-10-CM

## 2021-07-27 DIAGNOSIS — J45.40 MODERATE PERSISTENT ASTHMA WITHOUT COMPLICATION: ICD-10-CM

## 2021-07-27 DIAGNOSIS — E11.9 TYPE 2 DIABETES MELLITUS WITHOUT COMPLICATION, WITHOUT LONG-TERM CURRENT USE OF INSULIN (HCC): ICD-10-CM

## 2021-07-27 DIAGNOSIS — I10 ESSENTIAL HYPERTENSION: ICD-10-CM

## 2021-07-27 DIAGNOSIS — R80.9 MICROALBUMINURIA DUE TO TYPE 2 DIABETES MELLITUS (HCC): ICD-10-CM

## 2021-07-27 DIAGNOSIS — E11.9 CONTROLLED TYPE 2 DIABETES MELLITUS WITHOUT COMPLICATION, WITHOUT LONG-TERM CURRENT USE OF INSULIN (HCC): Primary | ICD-10-CM

## 2021-07-27 PROBLEM — F41.9 ANXIETY: Status: RESOLVED | Noted: 2020-05-07 | Resolved: 2021-07-27

## 2021-07-27 PROCEDURE — 3725F SCREEN DEPRESSION PERFORMED: CPT | Performed by: INTERNAL MEDICINE

## 2021-07-27 PROCEDURE — 4010F ACE/ARB THERAPY RXD/TAKEN: CPT | Performed by: INTERNAL MEDICINE

## 2021-07-27 PROCEDURE — 3008F BODY MASS INDEX DOCD: CPT | Performed by: INTERNAL MEDICINE

## 2021-07-27 PROCEDURE — 99214 OFFICE O/P EST MOD 30 MIN: CPT | Performed by: INTERNAL MEDICINE

## 2021-07-27 PROCEDURE — 3066F NEPHROPATHY DOC TX: CPT | Performed by: INTERNAL MEDICINE

## 2021-07-27 RX ORDER — ALBUTEROL SULFATE 90 UG/1
2 AEROSOL, METERED RESPIRATORY (INHALATION) EVERY 4 HOURS PRN
Qty: 18 G | Refills: 5 | Status: SHIPPED | OUTPATIENT
Start: 2021-07-27

## 2021-07-27 RX ORDER — PRAVASTATIN SODIUM 20 MG
20 TABLET ORAL DAILY
Qty: 90 TABLET | Refills: 1 | Status: SHIPPED | OUTPATIENT
Start: 2021-07-27 | End: 2022-01-16

## 2021-07-27 RX ORDER — METFORMIN HYDROCHLORIDE 500 MG/1
TABLET, EXTENDED RELEASE ORAL
Qty: 90 TABLET | Refills: 2
Start: 2021-07-27 | End: 2022-01-16

## 2021-07-27 RX ORDER — LISINOPRIL 30 MG/1
30 TABLET ORAL DAILY
Qty: 90 TABLET | Refills: 1 | Status: SHIPPED | OUTPATIENT
Start: 2021-07-27 | End: 2022-01-16

## 2021-07-27 NOTE — ASSESSMENT & PLAN NOTE
Lab Results   Component Value Date    HGBA1C 5 8 (H) 07/20/2021   DM type 2 w/o complications - controlled with A1c 5 8 - two A1c <6 0 now - will decrease Metformin XR from 500 mg 2 tab daily to 500 mg 1 tab PO q am, recheck BW in 6 mos - WILL ORDER AT FUTURE APPT, diet/exercise/wgt loss encouraged, UTD on foot exam (1/21), will get copy of eye exam from Dr Christian Orantes, on ACE and will start statin today

## 2021-07-27 NOTE — PROGRESS NOTES
Assessment/Plan:    Controlled type 2 diabetes mellitus without complication, without long-term current use of insulin (HCC)    Lab Results   Component Value Date    HGBA1C 5 8 (H) 07/20/2021   DM type 2 w/o complications - controlled with A1c 5 8 - two A1c <6 0 now - will decrease Metformin XR from 500 mg 2 tab daily to 500 mg 1 tab PO q am, recheck BW in 6 mos - WILL ORDER AT FUTURE APPT, diet/exercise/wgt loss encouraged, UTD on foot exam (1/21), will get copy of eye exam from Dr Maria Dolorse Delarosa, on ACE and will start statin today    Essential hypertriglyceridemia  TG at goal, con't to encourage diet/exercise/wgt loss, recheck FLP in 6mos    Dyslipidemia  LDL elevated and HDL low, 10 yr ASCVD risk score 17 85 - d/w pt in detail, benefit and SE of statin reviewed, pt agreeable to try low dose statin - rx for Pravastatin 20 mg 1 tab PO q day sent to  Pharmacy, diet/exercise/wgt loss encouraged, recheck BW in 6 mos - WILL ORDER AT NEXT APPT    Essential hypertension  BP a bit above goal and higher on recheck at end of appt, Urine microalbumin:Cr ratio was elevated - benefit of ACE reviewed - increase Lisinopril to 30 mg daily, recheck in 3 mos    Microalbuminuria due to type 2 diabetes mellitus (HCC)  Increase lisinopril to 30 mg daily, importance BP and BS control reviewed  Lab Results   Component Value Date    HGBA1C 5 8 (H) 07/20/2021       Moderate persistent asthma without complication  Rescue inhaler refilled upon request, no s/sx of exacerbation and no regular use of inhaler noted - call with resp symptoms, has had both COVID vaccine's    History of endometrial cancer  Following with GYN Onc as directed, UTD on screenings, no GYN symptoms, will follow       Diagnoses and all orders for this visit:    Controlled type 2 diabetes mellitus without complication, without long-term current use of insulin (HCC)    Essential hypertriglyceridemia    Dyslipidemia  -     pravastatin (PRAVACHOL) 20 mg tablet;  Take 1 tablet (20 mg total) by mouth daily    Essential hypertension  -     lisinopril (ZESTRIL) 30 mg tablet; Take 1 tablet (30 mg total) by mouth daily    History of endometrial cancer    Moderate persistent asthma without complication  -     albuterol (PROVENTIL HFA,VENTOLIN HFA) 90 mcg/act inhaler; Inhale 2 puffs every 4 (four) hours as needed for wheezing or shortness of breath    Type 2 diabetes mellitus without complication, without long-term current use of insulin (HCC)  -     metFORMIN (GLUCOPHAGE-XR) 500 mg 24 hr tablet; TAKE 1 TABLETS BY MOUTH IN THE MORNING  -     lisinopril (ZESTRIL) 30 mg tablet; Take 1 tablet (30 mg total) by mouth daily    Microalbuminuria due to type 2 diabetes mellitus ([facility] [address] )      Colonoscopy 9/14 - 10 yrs    Mammo 5/21    PAP PATITO B/L SBO    Has had both COVID vaccine's        Subjective:      Patient ID: Preet Muñoz is a 64 y o  female  HPI Pt here for follow up appt and BW results    BW results were d/w pt in detail: FBS/A1C 92/5 8 , Total protein 8 4, rest of CMP and CBC was wnl, FLP with HDL low at 39 and LDL up at 116, TC/TG at goal, Urine microalbumin:cr ratio was 41  Def of controlled vs uncontrolled DM was reviewed  Diet was reviewed - wgt up 6 lbs  She states diet is up and down and does no formal exercise  She is taking her DM meds as directed  She is UTD on foot exam (1/21)  and states she had an eye exam with Dr Charli Montelongo recently - will obtain copy  She is on an ACE but no statin  10 yr ASCVD risk score reviewed  RF for CVD reviewed  Benefit and SE of statin reviewed  BP at upper end of goal today and meds were reviewed and no changes have occurred  She denies missing doses of meds or SE with the meds  She does not check her BP outside the office  She notes no frequent Ha's/dizziness/double vision/CP  Pt with h/o endometrial cancer  She follows with GYN Onc and saw Nurys Acuna last week - OV note reviewed  She is to f/u in 6 mos  She notes no GYN symptoms  Pt requesting refill on albuterol  Humidity and heat right now bothering her  Also having work done in the house and the dust is irritating  Not using the inhaler even once a week  She notes no chronic cough/SOB/wheezing  She has had both COVID vaccine and one pneumovax    Colonoscopy 9/14 - 10 yrs    Mammo 5/21    PAP PATITO B/L SBO      Review of Systems   Constitutional: Negative for chills and fever  HENT: Negative for congestion and sore throat  Eyes: Negative for pain and visual disturbance  Respiratory: Negative for cough, shortness of breath and wheezing  Cardiovascular: Negative for chest pain and palpitations  Gastrointestinal: Negative for abdominal pain, blood in stool, constipation, diarrhea, nausea and vomiting  Endocrine: Negative for polydipsia and polyuria  Genitourinary: Negative for difficulty urinating and dysuria  Musculoskeletal: Negative for back pain and neck pain  Skin: Negative for wound  Psoriasis lesion on B/L LE   Neurological: Negative for dizziness, light-headedness and headaches  Hematological: Negative for adenopathy  Psychiatric/Behavioral: Negative for behavioral problems and confusion  Objective:    /84   Pulse 88   Temp (!) 97 °F (36 1 °C) (Temporal)   Ht 5' 1 5" (1 562 m)   Wt 88 6 kg (195 lb 6 4 oz)   LMP 04/20/2020   BMI 36 32 kg/m²      Physical Exam  Vitals and nursing note reviewed  Constitutional:       General: She is not in acute distress  Appearance: She is well-developed  She is not ill-appearing  HENT:      Head: Normocephalic and atraumatic  Eyes:      General:         Right eye: No discharge  Left eye: No discharge  Conjunctiva/sclera: Conjunctivae normal    Neck:      Trachea: No tracheal deviation  Cardiovascular:      Rate and Rhythm: Normal rate and regular rhythm  Heart sounds: Normal heart sounds  No murmur heard  No friction rub     Pulmonary:      Effort: Pulmonary effort is normal  No respiratory distress  Breath sounds: Normal breath sounds  No wheezing, rhonchi or rales  Abdominal:      General: There is no distension  Palpations: Abdomen is soft  Tenderness: There is no abdominal tenderness  There is no guarding or rebound  Musculoskeletal:         General: No deformity or signs of injury  Cervical back: Neck supple  Skin:     General: Skin is warm  Coloration: Skin is not pale  Comments: Psoriatic plaques B/L knees    Neurological:      General: No focal deficit present  Mental Status: She is alert  Mental status is at baseline  Motor: No abnormal muscle tone  Psychiatric:         Mood and Affect: Mood normal          Behavior: Behavior normal          Thought Content:  Thought content normal          Judgment: Judgment normal

## 2021-07-27 NOTE — ASSESSMENT & PLAN NOTE
LDL elevated and HDL low, 10 yr ASCVD risk score 17 85 - d/w pt in detail, benefit and SE of statin reviewed, pt agreeable to try low dose statin - rx for Pravastatin 20 mg 1 tab PO q day sent to  Pharmacy, diet/exercise/wgt loss encouraged, recheck BW in 6 mos - WILL ORDER AT NEXT APPT

## 2021-07-27 NOTE — ASSESSMENT & PLAN NOTE
Increase lisinopril to 30 mg daily, importance BP and BS control reviewed  Lab Results   Component Value Date    HGBA1C 5 8 (H) 07/20/2021

## 2021-07-27 NOTE — ASSESSMENT & PLAN NOTE
BP a bit above goal and higher on recheck at end of appt, Urine microalbumin:Cr ratio was elevated - benefit of ACE reviewed - increase Lisinopril to 30 mg daily, recheck in 3 mos

## 2021-09-02 ENCOUNTER — TELEPHONE (OUTPATIENT)
Dept: FAMILY MEDICINE CLINIC | Facility: HOSPITAL | Age: 61
End: 2021-09-02

## 2021-09-02 PROCEDURE — U0005 INFEC AGEN DETEC AMPLI PROBE: HCPCS | Performed by: INTERNAL MEDICINE

## 2021-09-02 PROCEDURE — U0003 INFECTIOUS AGENT DETECTION BY NUCLEIC ACID (DNA OR RNA); SEVERE ACUTE RESPIRATORY SYNDROME CORONAVIRUS 2 (SARS-COV-2) (CORONAVIRUS DISEASE [COVID-19]), AMPLIFIED PROBE TECHNIQUE, MAKING USE OF HIGH THROUGHPUT TECHNOLOGIES AS DESCRIBED BY CMS-2020-01-R: HCPCS | Performed by: INTERNAL MEDICINE

## 2021-09-02 NOTE — TELEPHONE ENCOUNTER
Patient has 5 co-workers that have tested covid+  Last with these people on Monday and yesterday  When with the co-workers they are masked  Patient is vaccinated  Today woke up w/ chest tightness  No fever, cough, headache, gi issues  Asking to be covid tested      pcb

## 2021-09-29 NOTE — TELEPHONE ENCOUNTER
History: Patient had history of having bloody discharge from nipple.  Was seen by breast surgeon and work-up was unremarkable.  Thought to be secondary to her IUD. Currently is not having any nipple discharge or bleeding.   Assessment: established condition   Plan: This condition has resolved.  She will be due for her annual mammogram with ultrasound in November so orders provided   On Sunday, patient started developing symptoms of chills, fever, shortness of breath, and cough  Fever has been low grade at around 99 7  Went to [facility] on Tuesday and was tested for COVID, but she said the results are not back yet  Has been using her neb, inhaler, and a cold and flu medication (I believe it's something from [facility], she said they do not sell it here - sounds like it's equivalent to Tamiflu)  Wants to know if there is anything else that she should be doing   I did say that you might want to see her for a virtual, although she does have a pending COVID test   Please advise, TY

## 2021-10-19 NOTE — TELEPHONE ENCOUNTER
Problem: Depressive Behavior With or Without Suicide Precautions:  Goal: Able to verbalize acceptance of life and situations over which he or she has no control  Description: Able to verbalize acceptance of life and situations over which he or she has no control  10/18/2021 2237 by Parul Enriquez  Outcome: Met This Shift   Pt denies suicidal ideation. Denies hallucinations & denies homicidal thoughts. She is active & social on unit. Attends group has tendency to monopolize the group. Pt is hyper-verbal, accepts redirection. Pt relating that upon D/C she will be going to [place].  She is concentrating on remaining sober. She is compliant with medicine & has a good appetite.     Problem: Pain:  Goal: Pain level will decrease  Description: Pain level will decrease  10/18/2021 2237 by Parul Enriquez  Outcome: Met This Shift  Pt states, \"my pain is more under control now\" rx for augmentin was printed out to be called in as no pharmacy was in chart - please notify pt that cough can last 4-6 wks and to use OTC decongestant/lozenges/hot tea/gargles and rest and fluids, call with SOB/wheezing/F/C

## 2021-10-26 ENCOUNTER — OFFICE VISIT (OUTPATIENT)
Dept: FAMILY MEDICINE CLINIC | Facility: HOSPITAL | Age: 61
End: 2021-10-26
Payer: COMMERCIAL

## 2021-10-26 VITALS
SYSTOLIC BLOOD PRESSURE: 116 MMHG | BODY MASS INDEX: 35.88 KG/M2 | WEIGHT: 195 LBS | DIASTOLIC BLOOD PRESSURE: 78 MMHG | HEART RATE: 86 BPM | HEIGHT: 62 IN | TEMPERATURE: 97.7 F

## 2021-10-26 DIAGNOSIS — Z23 ENCOUNTER FOR IMMUNIZATION: ICD-10-CM

## 2021-10-26 DIAGNOSIS — E11.29 MICROALBUMINURIA DUE TO TYPE 2 DIABETES MELLITUS (HCC): ICD-10-CM

## 2021-10-26 DIAGNOSIS — Z00.00 ANNUAL PHYSICAL EXAM: ICD-10-CM

## 2021-10-26 DIAGNOSIS — I10 ESSENTIAL HYPERTENSION: ICD-10-CM

## 2021-10-26 DIAGNOSIS — E03.8 OTHER SPECIFIED HYPOTHYROIDISM: ICD-10-CM

## 2021-10-26 DIAGNOSIS — J45.40 MODERATE PERSISTENT ASTHMA WITHOUT COMPLICATION: ICD-10-CM

## 2021-10-26 DIAGNOSIS — L40.9 PSORIASIS: Primary | ICD-10-CM

## 2021-10-26 DIAGNOSIS — R80.9 MICROALBUMINURIA DUE TO TYPE 2 DIABETES MELLITUS (HCC): ICD-10-CM

## 2021-10-26 DIAGNOSIS — E66.01 SEVERE OBESITY (BMI 35.0-39.9) WITH COMORBIDITY (HCC): ICD-10-CM

## 2021-10-26 DIAGNOSIS — E11.9 CONTROLLED TYPE 2 DIABETES MELLITUS WITHOUT COMPLICATION, WITHOUT LONG-TERM CURRENT USE OF INSULIN (HCC): ICD-10-CM

## 2021-10-26 PROCEDURE — 90682 RIV4 VACC RECOMBINANT DNA IM: CPT

## 2021-10-26 PROCEDURE — 3066F NEPHROPATHY DOC TX: CPT | Performed by: INTERNAL MEDICINE

## 2021-10-26 PROCEDURE — 3074F SYST BP LT 130 MM HG: CPT | Performed by: INTERNAL MEDICINE

## 2021-10-26 PROCEDURE — 90471 IMMUNIZATION ADMIN: CPT

## 2021-10-26 PROCEDURE — 99396 PREV VISIT EST AGE 40-64: CPT | Performed by: INTERNAL MEDICINE

## 2021-10-26 PROCEDURE — 3008F BODY MASS INDEX DOCD: CPT | Performed by: INTERNAL MEDICINE

## 2021-10-26 PROCEDURE — 3078F DIAST BP <80 MM HG: CPT | Performed by: INTERNAL MEDICINE

## 2021-10-26 RX ORDER — TRIAMCINOLONE ACETONIDE 1 MG/G
CREAM TOPICAL
COMMUNITY
Start: 2021-08-24

## 2021-10-26 RX ORDER — APREMILAST 30 MG/1
1 TABLET, FILM COATED ORAL DAILY
Start: 2021-10-26

## 2021-11-19 DIAGNOSIS — E78.1 ESSENTIAL HYPERTRIGLYCERIDEMIA: ICD-10-CM

## 2021-11-19 RX ORDER — FENOFIBRATE 200 MG/1
200 CAPSULE ORAL DAILY
Qty: 90 CAPSULE | Refills: 1 | Status: SHIPPED | OUTPATIENT
Start: 2021-11-19

## 2022-01-14 DIAGNOSIS — Z91.89 AT INCREASED RISK OF EXPOSURE TO COVID-19 VIRUS: Primary | ICD-10-CM

## 2022-01-15 PROCEDURE — U0005 INFEC AGEN DETEC AMPLI PROBE: HCPCS | Performed by: INTERNAL MEDICINE

## 2022-01-15 PROCEDURE — U0003 INFECTIOUS AGENT DETECTION BY NUCLEIC ACID (DNA OR RNA); SEVERE ACUTE RESPIRATORY SYNDROME CORONAVIRUS 2 (SARS-COV-2) (CORONAVIRUS DISEASE [COVID-19]), AMPLIFIED PROBE TECHNIQUE, MAKING USE OF HIGH THROUGHPUT TECHNOLOGIES AS DESCRIBED BY CMS-2020-01-R: HCPCS | Performed by: INTERNAL MEDICINE

## 2022-01-16 DIAGNOSIS — E11.9 TYPE 2 DIABETES MELLITUS WITHOUT COMPLICATION, WITHOUT LONG-TERM CURRENT USE OF INSULIN (HCC): ICD-10-CM

## 2022-01-16 DIAGNOSIS — I10 ESSENTIAL HYPERTENSION: ICD-10-CM

## 2022-01-16 DIAGNOSIS — E78.5 DYSLIPIDEMIA: ICD-10-CM

## 2022-01-16 RX ORDER — PRAVASTATIN SODIUM 20 MG
TABLET ORAL
Qty: 90 TABLET | Refills: 0 | Status: SHIPPED | OUTPATIENT
Start: 2022-01-16 | End: 2022-04-10

## 2022-01-16 RX ORDER — METFORMIN HYDROCHLORIDE 500 MG/1
TABLET, EXTENDED RELEASE ORAL
Qty: 180 TABLET | Refills: 0 | Status: SHIPPED | OUTPATIENT
Start: 2022-01-16 | End: 2022-02-08

## 2022-01-16 RX ORDER — LISINOPRIL 30 MG/1
TABLET ORAL
Qty: 90 TABLET | Refills: 0 | Status: SHIPPED | OUTPATIENT
Start: 2022-01-16 | End: 2022-05-09

## 2022-01-17 ENCOUNTER — TELEPHONE (OUTPATIENT)
Dept: GYNECOLOGIC ONCOLOGY | Facility: CLINIC | Age: 62
End: 2022-01-17

## 2022-02-01 ENCOUNTER — LAB (OUTPATIENT)
Dept: LAB | Facility: HOSPITAL | Age: 62
End: 2022-02-01
Payer: COMMERCIAL

## 2022-02-01 DIAGNOSIS — E11.9 CONTROLLED TYPE 2 DIABETES MELLITUS WITHOUT COMPLICATION, WITHOUT LONG-TERM CURRENT USE OF INSULIN (HCC): ICD-10-CM

## 2022-02-01 DIAGNOSIS — E03.8 OTHER SPECIFIED HYPOTHYROIDISM: ICD-10-CM

## 2022-02-01 LAB
ANION GAP SERPL CALCULATED.3IONS-SCNC: 5 MMOL/L (ref 4–13)
BUN SERPL-MCNC: 20 MG/DL (ref 5–25)
CALCIUM SERPL-MCNC: 9.9 MG/DL (ref 8.3–10.1)
CHLORIDE SERPL-SCNC: 109 MMOL/L (ref 100–108)
CO2 SERPL-SCNC: 25 MMOL/L (ref 21–32)
CREAT SERPL-MCNC: 0.76 MG/DL (ref 0.6–1.3)
EST. AVERAGE GLUCOSE BLD GHB EST-MCNC: 128 MG/DL
GFR SERPL CREATININE-BSD FRML MDRD: 86 ML/MIN/1.73SQ M
GLUCOSE P FAST SERPL-MCNC: 101 MG/DL (ref 65–99)
HBA1C MFR BLD: 6.1 %
POTASSIUM SERPL-SCNC: 4.1 MMOL/L (ref 3.5–5.3)
SODIUM SERPL-SCNC: 139 MMOL/L (ref 136–145)
TSH SERPL DL<=0.05 MIU/L-ACNC: 1.05 UIU/ML (ref 0.36–3.74)

## 2022-02-01 PROCEDURE — 84443 ASSAY THYROID STIM HORMONE: CPT

## 2022-02-01 PROCEDURE — 83036 HEMOGLOBIN GLYCOSYLATED A1C: CPT

## 2022-02-01 PROCEDURE — 80048 BASIC METABOLIC PNL TOTAL CA: CPT

## 2022-02-01 PROCEDURE — 3044F HG A1C LEVEL LT 7.0%: CPT | Performed by: PHYSICIAN ASSISTANT

## 2022-02-01 PROCEDURE — 36415 COLL VENOUS BLD VENIPUNCTURE: CPT

## 2022-02-08 ENCOUNTER — OFFICE VISIT (OUTPATIENT)
Dept: FAMILY MEDICINE CLINIC | Facility: HOSPITAL | Age: 62
End: 2022-02-08
Payer: COMMERCIAL

## 2022-02-08 ENCOUNTER — TELEPHONE (OUTPATIENT)
Dept: HEMATOLOGY ONCOLOGY | Facility: CLINIC | Age: 62
End: 2022-02-08

## 2022-02-08 VITALS
BODY MASS INDEX: 36.14 KG/M2 | HEART RATE: 86 BPM | DIASTOLIC BLOOD PRESSURE: 80 MMHG | WEIGHT: 196.4 LBS | HEIGHT: 62 IN | TEMPERATURE: 97.3 F | SYSTOLIC BLOOD PRESSURE: 132 MMHG

## 2022-02-08 DIAGNOSIS — J45.40 MODERATE PERSISTENT ASTHMA WITHOUT COMPLICATION: ICD-10-CM

## 2022-02-08 DIAGNOSIS — E11.9 CONTROLLED TYPE 2 DIABETES MELLITUS WITHOUT COMPLICATION, WITHOUT LONG-TERM CURRENT USE OF INSULIN (HCC): Primary | ICD-10-CM

## 2022-02-08 DIAGNOSIS — R80.9 MICROALBUMINURIA DUE TO TYPE 2 DIABETES MELLITUS (HCC): ICD-10-CM

## 2022-02-08 DIAGNOSIS — E11.29 MICROALBUMINURIA DUE TO TYPE 2 DIABETES MELLITUS (HCC): ICD-10-CM

## 2022-02-08 DIAGNOSIS — E11.9 TYPE 2 DIABETES MELLITUS WITHOUT COMPLICATION, WITHOUT LONG-TERM CURRENT USE OF INSULIN (HCC): ICD-10-CM

## 2022-02-08 DIAGNOSIS — E03.8 OTHER SPECIFIED HYPOTHYROIDISM: ICD-10-CM

## 2022-02-08 DIAGNOSIS — I10 ESSENTIAL HYPERTENSION: ICD-10-CM

## 2022-02-08 DIAGNOSIS — Z12.31 ENCOUNTER FOR SCREENING MAMMOGRAM FOR MALIGNANT NEOPLASM OF BREAST: ICD-10-CM

## 2022-02-08 DIAGNOSIS — E66.01 SEVERE OBESITY (BMI 35.0-39.9) WITH COMORBIDITY (HCC): ICD-10-CM

## 2022-02-08 PROCEDURE — 3066F NEPHROPATHY DOC TX: CPT | Performed by: PHYSICIAN ASSISTANT

## 2022-02-08 PROCEDURE — 99214 OFFICE O/P EST MOD 30 MIN: CPT | Performed by: INTERNAL MEDICINE

## 2022-02-08 RX ORDER — METFORMIN HYDROCHLORIDE 500 MG/1
TABLET, EXTENDED RELEASE ORAL
Qty: 90 TABLET | Refills: 0
Start: 2022-02-08

## 2022-02-08 NOTE — ASSESSMENT & PLAN NOTE
Lab Results   Component Value Date    HGBA1C 6 1 (H) 02/01/2022   DM type 2 with microalbuminuria - controlled with A1C 6 1 - cont' current Metformin, urged healthy diet/exercise/wgt loss, recheck BW in 6 mos or so - order given, foot exam done today, eye exam done 3/21 - 2 yrs, on ACE/statin, will follow

## 2022-02-08 NOTE — PATIENT INSTRUCTIONS
Obesity   AMBULATORY CARE:   Obesity  is when your body mass index (BMI) is greater than 30  Your healthcare provider will use your height and weight to measure your BMI  The risks of obesity include  many health problems, including injuries or physical disability  You may need tests to check for the following:  · Diabetes    · High blood pressure or high cholesterol    · Heart disease    · Stroke    · Gallbladder or liver disease    · Cancer of the colon, breast, prostate, liver, or kidney    · Sleep apnea    · Arthritis or gout    Seek care immediately if:   · You have a severe headache, confusion, or difficulty speaking  · You have weakness on one side of your body  · You have chest pain, sweating, or shortness of breath  Call your doctor if:   · You have symptoms of gallbladder or liver disease, such as pain in your upper abdomen  · You have knee or hip pain and discomfort while walking  · You have symptoms of diabetes, such as intense hunger and thirst, and frequent urination  · You have symptoms of sleep apnea, such as snoring or daytime sleepiness  · You have questions or concerns about your condition or care  Treatment for obesity  focuses on helping you lose weight to improve your health  Even a small decrease in BMI can reduce the risk for many health problems  Your healthcare provider will help you set a weight-loss goal   · Lifestyle changes  are the first step in treating obesity  These include making healthy food choices and getting regular physical activity  Your healthcare provider may suggest a weight-loss program that involves coaching, education, and therapy  · Medicine  may help you lose weight when it is used with a healthy foods and physical activity  · Surgery  can help you lose weight if you are very obese and have other health problems  There are several types of weight-loss surgery  Ask your healthcare provider for more information      Be successful losing weight:   · Set small, realistic goals  An example of a small goal is to walk for 20 minutes 5 days a week  Anther goal is to lose 5% of your body weight  · Tell friends, family members, and coworkers about your goals  and ask for their support  Ask a friend to lose weight with you, or join a weight-loss support group  · Identify foods or triggers that may cause you to overeat , and find ways to avoid them  Remove tempting high-calorie foods from your home and workplace  Place a bowl of fresh fruit on your kitchen counter  If stress causes you to eat, then find other ways to cope with stress  A counselor or therapist may be able to help you  · Keep a diary to track what you eat and drink  Also write down how many minutes of physical activity you do each day  Weigh yourself once a week and record it in your diary  Eating changes: You will need to eat 500 to 1,000 fewer calories each day than you currently eat to lose 1 to 2 pounds a week  The following changes will help you cut calories:  · Eat smaller portions  Use small plates, no larger than 9 inches in diameter  Fill your plate half full of fruits and vegetables  Measure your food using measuring cups until you know what a serving size looks like  · Eat 3 meals and 1 or 2 snacks each day  Plan your meals in advance  Terrence Erika and eat at home most of the time  Eat slowly  Do not skip meals  Skipping meals can lead to overeating later in the day  This can make it harder for you to lose weight  Talk with a dietitian to help you make a meal plan and schedule that is right for you  · Eat fruits and vegetables at every meal   They are low in calories and high in fiber, which makes you feel full  Do not add butter, margarine, or cream sauce to vegetables  Use herbs to season steamed vegetables  · Eat less fat and fewer fried foods  Eat more baked or grilled chicken and fish  These protein sources are lower in calories and fat than red meat  Limit fast food  Dress your salads with olive oil and vinegar instead of bottled dressing  · Limit the amount of sugar you eat  Do not drink sugary beverages  Limit alcohol  Activity changes:  Physical activity is good for your body in many ways  It helps you burn calories and build strong muscles  It decreases stress and depression, and improves your mood  It can also help you sleep better  Talk to your healthcare provider before you begin an exercise program   · Exercise for at least 30 minutes 5 days a week  Start slowly  Set aside time each day for physical activity that you enjoy and that is convenient for you  It is best to do both weight training and an activity that increases your heart rate, such as walking, bicycling, or swimming  · Find ways to be more active  Do yard work and housecleaning  Walk up the stairs instead of using elevators  Spend your leisure time going to events that require walking, such as outdoor festivals or fairs  This extra physical activity can help you lose weight and keep it off  Follow up with your doctor as directed: You may need to meet with a dietitian  Write down your questions so you remember to ask them during your visits  © Copyright [employer] 2021 Information is for End User's use only and may not be sold, redistributed or otherwise used for commercial purposes  All illustrations and images included in CareNotes® are the copyrighted property of [employer]  or [facility] Sarabjit Quinteros   The above information is an  only  It is not intended as medical advice for individual conditions or treatments  Talk to your doctor, nurse or pharmacist before following any medical regimen to see if it is safe and effective for you  Heart Healthy Diet   AMBULATORY CARE:   A heart healthy diet  is an eating plan low in unhealthy fats and sodium (salt)  The plan is high in healthy fats and fiber   A heart healthy diet helps improve your cholesterol levels and lowers your risk for heart disease and stroke  A dietitian will teach you how to read and understand food labels  Heart healthy diet guidelines to follow:   · Choose foods that contain healthy fats  ? Unsaturated fats  include monounsaturated and polyunsaturated fats  Unsaturated fat is found in foods such as soybean, canola, olive, corn, and safflower oils  It is also found in soft tub margarine that is made with liquid vegetable oil  ? Omega-3 fat  is found in certain fish, such as salmon, tuna, and trout, and in walnuts and flaxseed  Eat fish high in omega-3 fats at least 2 times a week  · Get 20 to 30 grams of fiber each day  Fruits, vegetables, whole-grain foods, and legumes (cooked beans) are good sources of fiber  · Limit or do not have unhealthy fats  ? Cholesterol  is found in animal foods, such as eggs and lobster, and in dairy products made from whole milk  Limit cholesterol to less than 200 mg each day  ? Saturated fat  is found in meats, such as sosa and hamburger  It is also found in chicken or turkey skin, whole milk, and butter  Limit saturated fat to less than 7% of your total daily calories  ? Trans fat  is found in packaged foods, such as potato chips and cookies  It is also in hard margarine, some fried foods, and shortening  Do not eat foods that contain trans fats  · Limit sodium as directed  You may be told to limit sodium to 2,000 to 2,300 mg each day  Choose low-sodium or no-salt-added foods  Add little or no salt to food you prepare  Use herbs and spices in place of salt  Include the following in your heart healthy plan:  Ask your dietitian or healthcare provider how many servings to have from each of the following food groups:  · Grains:      ? Whole-wheat breads, cereals, and pastas, and brown rice    ? Low-fat, low-sodium crackers and chips    · Vegetables:      ? Broccoli, green beans, green peas, and spinach    ?  Collards, kale, and lima beans    ? Carrots, sweet potatoes, tomatoes, and peppers    ? Canned vegetables with no salt added    · Fruits:      ? Bananas, peaches, pears, and pineapple    ? Grapes, raisins, and dates    ? Oranges, tangerines, grapefruit, orange juice, and grapefruit juice    ? Apricots, mangoes, melons, and papaya    ? Raspberries and strawberries    ? Canned fruit with no added sugar    · Low-fat dairy:      ? Nonfat (skim) milk, 1% milk, and low-fat almond, cashew, or soy milks fortified with calcium    ? Low-fat cheese, regular or frozen yogurt, and cottage cheese    · Meats and proteins:      ? Lean cuts of beef and pork (loin, leg, round), skinless chicken and turkey    ? Legumes, soy products, egg whites, or nuts    Limit or do not include the following in your heart healthy plan:   · Unhealthy fats and oils:      ? Whole or 2% milk, cream cheese, sour cream, or cheese    ? High-fat cuts of beef (T-bone steaks, ribs), chicken or turkey with skin, and organ meats such as liver    ? Butter, stick margarine, shortening, and cooking oils such as coconut or palm oil    · Foods and liquids high in sodium:      ? Packaged foods, such as frozen dinners, cookies, macaroni and cheese, and cereals with more than 300 mg of sodium per serving    ? Vegetables with added sodium, such as instant potatoes, vegetables with added sauces, or regular canned vegetables    ? Cured or smoked meats, such as hot dogs, sosa, and sausage    ? High-sodium ketchup, barbecue sauce, salad dressing, pickles, olives, soy sauce, or miso    · Foods and liquids high in sugar:      ? Candy, cake, cookies, pies, or doughnuts    ? Soft drinks (soda), sports drinks, or sweetened tea    ? Canned or dry mixes for cakes, soups, sauces, or gravies    Other healthy heart guidelines:   · Do not smoke  Nicotine and other chemicals in cigarettes and cigars can cause lung and heart damage   Ask your healthcare provider for information if you currently smoke and need help to quit  E-cigarettes or smokeless tobacco still contain nicotine  Talk to your healthcare provider before you use these products  · Limit or do not drink alcohol as directed  Alcohol can damage your heart and raise your blood pressure  Your healthcare provider may give you specific daily and weekly limits  The general recommended limit is 1 drink a day for women 21 or older and for men 72 or older  Do not have more than 3 drinks in a day or 7 in a week  The recommended limit is 2 drinks a day for men 24to 59years of age  Do not have more than 4 drinks in a day or 14 in a week  A drink of alcohol is 12 ounces of beer, 5 ounces of wine, or 1½ ounces of liquor  · Exercise regularly  Exercise can help you maintain a healthy weight and improve your blood pressure and cholesterol levels  Regular exercise can also decrease your risk for heart problems  Ask your healthcare provider about the best exercise plan for you  Do not start an exercise program without asking your healthcare provider  Follow up with your doctor or cardiologist as directed:  Write down your questions so you remember to ask them during your visits  © Copyright [employer] 2021 Information is for End User's use only and may not be sold, redistributed or otherwise used for commercial purposes  All illustrations and images included in CareNotes® are the copyrighted property of [employer] A M , Inc  or [facility] Sarabjit Quinteros   The above information is an  only  It is not intended as medical advice for individual conditions or treatments  Talk to your doctor, nurse or pharmacist before following any medical regimen to see if it is safe and effective for you

## 2022-02-08 NOTE — ASSESSMENT & PLAN NOTE
Urine test due in July - order given, on ACE, importance of BP and BS control reviewed - congratulated on good control of both  Lab Results   Component Value Date    HGBA1C 6 1 (H) 02/01/2022

## 2022-02-08 NOTE — ASSESSMENT & PLAN NOTE
No current issues with winter mos, not on daily inhaler and has had no use of rescue inhaler in mos/year, con't prn use and call with resp symptoms, urged to get COVID booster

## 2022-02-08 NOTE — PROGRESS NOTES
Assessment/Plan:    Controlled type 2 diabetes mellitus without complication, without long-term current use of insulin ([facility])    Lab Results   Component Value Date    HGBA1C 6 1 (H) 02/01/2022   DM type 2 with microalbuminuria - controlled with A1C 6 1 - cont' current Metformin, urged healthy diet/exercise/wgt loss, recheck BW in 6 mos or so - order given, foot exam done today, eye exam done 3/21 - 2 yrs, on ACE/statin, will follow    Microalbuminuria due to type 2 diabetes mellitus ([facility] )  Urine test due in July - order given, on ACE, importance of BP and BS control reviewed - congratulated on good control of both  Lab Results   Component Value Date    HGBA1C 6 1 (H) 02/01/2022       Other specified hypothyroidism  Clinically euthyroid and TFT's at goal, con't current levothyroxine, recheck TFT's in July/Aug - order given    Moderate persistent asthma without complication  No current issues with winter mos, not on daily inhaler and has had no use of rescue inhaler in mos/year, con't prn use and call with resp symptoms, urged to get COVID booster    Essential hypertension  BP at goal, con't current meds, diet/exercise/wgt loss encouraged       Diagnoses and all orders for this visit:    Controlled type 2 diabetes mellitus without complication, without long-term current use of insulin ([facility] )  -     CBC and differential; Future  -     Comprehensive metabolic panel; Future  -     Hemoglobin A1C; Future  -     Lipid panel; Future  -     TSH, 3rd generation with Free T4 reflex; Future  -     Microalbumin / creatinine urine ratio; Future    Type 2 diabetes mellitus without complication, without long-term current use of insulin ([facility])  -     metFORMIN (GLUCOPHAGE-XR) 500 mg 24 hr tablet; TAKE 1 TABLET BY MOUTH IN THE MORNING  -     CBC and differential; Future  -     Comprehensive metabolic panel; Future  -     Hemoglobin A1C; Future  -     Lipid panel; Future  -     TSH, 3rd generation with Free T4 reflex;  Future  - Microalbumin / creatinine urine ratio; Future    Other specified hypothyroidism  -     CBC and differential; Future  -     Comprehensive metabolic panel; Future  -     Hemoglobin A1C; Future  -     Lipid panel; Future  -     TSH, 3rd generation with Free T4 reflex; Future  -     Microalbumin / creatinine urine ratio; Future    Essential hypertension  -     CBC and differential; Future  -     Comprehensive metabolic panel; Future  -     Hemoglobin A1C; Future  -     Lipid panel; Future  -     TSH, 3rd generation with Free T4 reflex; Future  -     Microalbumin / creatinine urine ratio; Future    Moderate persistent asthma without complication  -     CBC and differential; Future  -     Comprehensive metabolic panel; Future  -     Hemoglobin A1C; Future  -     Lipid panel; Future  -     TSH, 3rd generation with Free T4 reflex; Future  -     Microalbumin / creatinine urine ratio; Future    Microalbuminuria due to type 2 diabetes mellitus (HCC)  -     CBC and differential; Future  -     Comprehensive metabolic panel; Future  -     Hemoglobin A1C; Future  -     Lipid panel; Future  -     TSH, 3rd generation with Free T4 reflex; Future  -     Microalbumin / creatinine urine ratio; Future    Encounter for screening mammogram for malignant neoplasm of breast  -     Mammo screening bilateral w 3d & cad; Future    Severe obesity (BMI 35 0-39  9) with comorbidity ([facility] )    BMI 36 0-36 9,adult      Colonoscopy 9/14 10 yrs    Mammo 5/21    PAP s/p hysterectomy    Urged to get COVID booster    Had pneumovax        Subjective:      Patient ID: Kylie Braxton is a 63 y o  female  HPI Pt here for BW results and f/u appt    BW results were d/w pt in detail: FBS/A1C 101/6 1 , TSH wnl     Def of controlled vs uncontrolled DM was reviewed  Diet was reviewed - wgt up 1 lbs from July 2021  BMI reviewed  She admits she could do better with fruits and veggies  She does not have a sweet tooth but could do better with carbs    She does no formal exercise  She is taking her DM meds as directed  She checks her sugars a few times a week and usually under 110 fasting in the am   She is UTD on DM eye exam (3/21 - 2 yrs) but is due for DM foot exam   She has a lot of dry skin but denies pain/numbness/sores/ulcers  She uses Vaseline on her feet  She is on statin and ACE  Pt is taking their thyroid medication daily but she does take it wall all her other meds but prior to eating  They deny any significant wgt changes/fatigue/C/tremor/palp/hair loss or skin changes  Has mild diarrhea but feels it is d/t her Otezla    BP at goal today and meds were reviewed and no changes have occurred  She denies missing doses of meds or SE with the meds  She does not check her BP outside the office  She notes no frequent Ha's/dizziness/double vision/CP  Pt notes no issues with her asthma during winter mos  She is not on a daily inhaler  She has not used her rescue inhaler in mos  She denies chronic cough/SOB/wheezing  She has to schedule her COVID booster  She had her flu vaccine  She has had 1 pneumovax  Review of Systems   Constitutional: Negative for chills, fatigue, fever and unexpected weight change  HENT: Negative for congestion and trouble swallowing  Eyes: Negative for pain and visual disturbance  Respiratory: Negative for cough and shortness of breath  Cardiovascular: Negative for chest pain, palpitations and leg swelling  Gastrointestinal: Positive for diarrhea  Negative for abdominal pain, blood in stool, constipation, nausea and vomiting  Endocrine: Negative for polydipsia and polyuria  Genitourinary: Negative for difficulty urinating and dysuria  Musculoskeletal: Negative for back pain and neck pain  Skin: Negative for rash and wound  Neurological: Negative for dizziness and headaches  Hematological: Does not bruise/bleed easily  Psychiatric/Behavioral: Negative for behavioral problems and confusion  Objective:    /80   Pulse 86   Temp (!) 97 3 °F (36 3 °C) (Tympanic)   Ht 5' 1 5" (1 562 m)   Wt 89 1 kg (196 lb 6 4 oz)   LMP 04/20/2020   BMI 36 51 kg/m²      Physical Exam  Vitals and nursing note reviewed  Constitutional:       General: She is not in acute distress  Appearance: She is well-developed  She is obese  She is not ill-appearing  HENT:      Head: Normocephalic and atraumatic  Eyes:      General:         Right eye: No discharge  Left eye: No discharge  Conjunctiva/sclera: Conjunctivae normal    Neck:      Trachea: No tracheal deviation  Cardiovascular:      Rate and Rhythm: Normal rate and regular rhythm  Pulses: no weak pulses          Dorsalis pedis pulses are 2+ on the right side and 2+ on the left side  Heart sounds: Normal heart sounds  No murmur heard  No friction rub  Pulmonary:      Effort: Pulmonary effort is normal  No respiratory distress  Breath sounds: Normal breath sounds  No wheezing, rhonchi or rales  Abdominal:      General: There is no distension  Palpations: Abdomen is soft  Tenderness: There is no abdominal tenderness  There is no guarding or rebound  Musculoskeletal:         General: No deformity or signs of injury  Cervical back: Neck supple  Feet:      Right foot:      Skin integrity: Dry skin present  No ulcer, skin breakdown, erythema, warmth or callus  Left foot:      Skin integrity: Dry skin present  No ulcer, skin breakdown, erythema, warmth or callus  Skin:     General: Skin is warm  Coloration: Skin is not pale  Findings: No rash  Neurological:      General: No focal deficit present  Mental Status: She is alert  Mental status is at baseline  Motor: No abnormal muscle tone  Gait: Gait normal    Psychiatric:         Mood and Affect: Mood normal          Behavior: Behavior normal          Thought Content:  Thought content normal          Judgment: Judgment normal  Patient's shoes and socks removed  Right Foot/Ankle   Right Foot Inspection  Skin Exam: skin normal, skin intact and dry skin  No warmth, no callus, no erythema, no maceration, no abnormal color, no pre-ulcer, no ulcer and no callus  Toe Exam: ROM and strength within normal limits  Sensory   Vibration: intact  Monofilament testing: intact    Vascular  The right DP pulse is 2+  Left Foot/Ankle  Left Foot Inspection  Skin Exam: skin normal, skin intact and dry skin  No warmth, no erythema, no maceration, normal color, no pre-ulcer, no ulcer and no callus  Toe Exam: ROM and strength within normal limits  Sensory   Vibration: intact  Monofilament testing: intact    Vascular  The left DP pulse is 2+  Assign Risk Category  No deformity present  No loss of protective sensation  No weak pulses  Risk: 0      BMI Counseling: Body mass index is 36 51 kg/m²  The BMI is above normal  Nutrition recommendations include reducing portion sizes, 3-5 servings of fruits/vegetables daily, consuming healthier snacks, moderation in carbohydrate intake, increasing intake of lean protein and reducing intake of saturated fat and trans fat  Exercise recommendations include exercising 3-5 times per week

## 2022-02-08 NOTE — TELEPHONE ENCOUNTER
Patient called to reschedule cancelled appt   Patient is now scheduled for  2-17-22 @ 3:30pm with Elizabeth Alva Earing

## 2022-02-08 NOTE — ASSESSMENT & PLAN NOTE
Clinically euthyroid and TFT's at goal, con't current levothyroxine, recheck TFT's in July/Aug - order given

## 2022-02-17 ENCOUNTER — OFFICE VISIT (OUTPATIENT)
Dept: GYNECOLOGIC ONCOLOGY | Facility: HOSPITAL | Age: 62
End: 2022-02-17
Payer: COMMERCIAL

## 2022-02-17 VITALS
TEMPERATURE: 97.6 F | HEART RATE: 87 BPM | WEIGHT: 195 LBS | RESPIRATION RATE: 16 BRPM | HEIGHT: 62 IN | BODY MASS INDEX: 35.88 KG/M2 | SYSTOLIC BLOOD PRESSURE: 112 MMHG | DIASTOLIC BLOOD PRESSURE: 60 MMHG | OXYGEN SATURATION: 97 %

## 2022-02-17 DIAGNOSIS — Z85.42 HISTORY OF ENDOMETRIAL CANCER: Primary | ICD-10-CM

## 2022-02-17 DIAGNOSIS — Z85.42 ENCOUNTER FOR FOLLOW-UP SURVEILLANCE OF ENDOMETRIAL CANCER: ICD-10-CM

## 2022-02-17 DIAGNOSIS — Z08 ENCOUNTER FOR FOLLOW-UP SURVEILLANCE OF ENDOMETRIAL CANCER: ICD-10-CM

## 2022-02-17 PROCEDURE — 3078F DIAST BP <80 MM HG: CPT | Performed by: PHYSICIAN ASSISTANT

## 2022-02-17 PROCEDURE — 3074F SYST BP LT 130 MM HG: CPT | Performed by: PHYSICIAN ASSISTANT

## 2022-02-17 PROCEDURE — 3008F BODY MASS INDEX DOCD: CPT | Performed by: PHYSICIAN ASSISTANT

## 2022-02-17 PROCEDURE — 99212 OFFICE O/P EST SF 10 MIN: CPT | Performed by: PHYSICIAN ASSISTANT

## 2022-02-17 NOTE — ASSESSMENT & PLAN NOTE
History of stage IA, grade 1 endometrial cancer s/p surgical resection in June 2020  She is clinically without evidence of disease recurrence        Return to the office in 6 months for continued cancer surveillance  At that visit, she will be transitioned to annual follow-up

## 2022-02-17 NOTE — PROGRESS NOTES
Assessment/Plan:    Problem List Items Addressed This Visit        Other    History of endometrial cancer - Primary     History of stage IA, grade 1 endometrial cancer s/p surgical resection in June 2020  She is clinically without evidence of disease recurrence        Return to the office in 6 months for continued cancer surveillance  At that visit, she will be transitioned to annual follow-up  Encounter for follow-up surveillance of endometrial cancer            CHIEF COMPLAINT:   Endometrial cancer surveillance    Problem:  Cancer Staging  History of endometrial cancer  Staging form: Corpus Uteri - Carcinoma, AJCC 8th Edition  - Clinical: FIGO Stage IA - Signed by Yunior Miller PA-C on 6/10/2020        Previous therapy:  Oncology History   History of endometrial cancer   5/28/2020 Surgery    Robotic assisted TLH, BSO, sentinel LND and cystoscopy   A  Grade 1  B  No myometrial invasion, no LVSI  C  2 negative lymph nodes  D  IHC, all proteins intact  6/10/2020 -  Cancer Staged    Staging form: Corpus Uteri - Carcinoma, AJCC 8th Edition  - Clinical: FIGO Stage IA - Signed by Yunior Miller PA-C on 6/10/2020  Histologic grade (G): G1  Histologic grading system: 3 grade system             Patient ID: Cindy Mcdermott is a 63 y o  female  who has no new complaints today  No vaginal bleeding, abdominal/pelvic pain  Normal bowel and bladder function  No interval change in medical history since last visit  Quality of life is good  The following portions of the patient's history were reviewed and updated as appropriate: allergies, current medications, past medical history, past surgical history and problem list     Review of Systems   Constitutional: Negative  HENT: Negative  Eyes: Negative  Respiratory: Negative  Cardiovascular: Negative  Gastrointestinal: Negative  Genitourinary: Negative  Musculoskeletal: Negative  Skin: Negative  Neurological: Negative  Psychiatric/Behavioral: Negative  Current Outpatient Medications   Medication Sig Dispense Refill    albuterol (PROVENTIL HFA,VENTOLIN HFA) 90 mcg/act inhaler Inhale 2 puffs every 4 (four) hours as needed for wheezing or shortness of breath 18 g 5    Apremilast (Otezla) 30 MG TABS Take 1 tablet by mouth daily      cholecalciferol (VITAMIN D3) 1,000 units tablet Take 1 tablet by mouth daily      fenofibrate micronized (LOFIBRA) 200 MG capsule Take 1 capsule (200 mg total) by mouth daily 90 capsule 1    levothyroxine 200 mcg tablet TAKE 1 TABLET BY MOUTH ONCE DAILY EXCEPT  FOR  ONE-HALF  TABLET  ONCE  WEEKLY  AS  DIRECTED 90 tablet 2    lisinopril (ZESTRIL) 30 mg tablet Take 1 tablet by mouth once daily 90 tablet 0    metFORMIN (GLUCOPHAGE-XR) 500 mg 24 hr tablet TAKE 1 TABLET BY MOUTH IN THE MORNING 90 tablet 0    pravastatin (PRAVACHOL) 20 mg tablet Take 1 tablet by mouth once daily 90 tablet 0    Taltz 80 MG/ML SOAJ       triamcinolone (KENALOG) 0 1 % cream APPLY TO AFFECTED AREA(S) TWICE DAILY AS NEEDED       No current facility-administered medications for this visit  Objective:    Blood pressure 112/60, pulse 87, temperature 97 6 °F (36 4 °C), temperature source Temporal, resp  rate 16, height 5' 1 5" (1 562 m), weight 88 5 kg (195 lb), last menstrual period 04/20/2020, SpO2 97 %, not currently breastfeeding  Body mass index is 36 25 kg/m²  Body surface area is 1 88 meters squared  Physical Exam  Vitals reviewed  Exam conducted with a chaperone present  Constitutional:       General: She is not in acute distress  Appearance: Normal appearance  She is not ill-appearing  HENT:      Head: Normocephalic and atraumatic  Mouth/Throat:      Mouth: Mucous membranes are moist    Eyes:      General:         Right eye: No discharge  Left eye: No discharge        Conjunctiva/sclera: Conjunctivae normal    Pulmonary:      Effort: Pulmonary effort is normal    Abdominal: Palpations: Abdomen is soft  There is no mass  Tenderness: There is no abdominal tenderness  Hernia: No hernia is present  Genitourinary:     Comments: The external female genitalia is normal  The bartholin's, uretheral and skenes glands are normal  The urethral meatus is normal (midline with no lesions)  Anus without fissure or lesion  Speculum exam reveals a grossly normal vagina  No masses, lesions,discharge or bleeding  No significant cystocele or rectocele noted  Bimanual exam notes a surgical absent cervix, uterus and adnexal structures  No masses or fullness  Bladder is without fullness, mass or tenderness  Musculoskeletal:      Right lower leg: No edema  Left lower leg: No edema  Skin:     General: Skin is warm and dry  Coloration: Skin is not jaundiced  Findings: No rash  Neurological:      General: No focal deficit present  Mental Status: She is alert and oriented to person, place, and time  Cranial Nerves: No cranial nerve deficit  Sensory: No sensory deficit  Motor: No weakness  Gait: Gait normal    Psychiatric:         Mood and Affect: Mood normal          Behavior: Behavior normal          Thought Content:  Thought content normal          Judgment: Judgment normal

## 2022-04-10 DIAGNOSIS — E03.8 OTHER SPECIFIED HYPOTHYROIDISM: ICD-10-CM

## 2022-04-10 DIAGNOSIS — E78.5 DYSLIPIDEMIA: ICD-10-CM

## 2022-04-10 RX ORDER — PRAVASTATIN SODIUM 20 MG
TABLET ORAL
Qty: 90 TABLET | Refills: 0 | Status: SHIPPED | OUTPATIENT
Start: 2022-04-10

## 2022-04-10 RX ORDER — LEVOTHYROXINE SODIUM 0.2 MG/1
TABLET ORAL
Qty: 90 TABLET | Refills: 0 | Status: SHIPPED | OUTPATIENT
Start: 2022-04-10

## 2022-05-09 DIAGNOSIS — E11.9 TYPE 2 DIABETES MELLITUS WITHOUT COMPLICATION, WITHOUT LONG-TERM CURRENT USE OF INSULIN (HCC): ICD-10-CM

## 2022-05-09 DIAGNOSIS — I10 ESSENTIAL HYPERTENSION: ICD-10-CM

## 2022-05-09 RX ORDER — LISINOPRIL 30 MG/1
TABLET ORAL
Qty: 90 TABLET | Refills: 0 | Status: SHIPPED | OUTPATIENT
Start: 2022-05-09

## 2022-05-26 ENCOUNTER — HOSPITAL ENCOUNTER (OUTPATIENT)
Dept: MAMMOGRAPHY | Facility: IMAGING CENTER | Age: 62
Discharge: HOME | End: 2022-05-26
Payer: COMMERCIAL

## 2022-05-26 VITALS — BODY MASS INDEX: 36.63 KG/M2 | WEIGHT: 194 LBS | HEIGHT: 61 IN

## 2022-05-26 DIAGNOSIS — Z12.31 ENCOUNTER FOR SCREENING MAMMOGRAM FOR MALIGNANT NEOPLASM OF BREAST: ICD-10-CM

## 2022-05-26 PROCEDURE — 77063 BREAST TOMOSYNTHESIS BI: CPT

## 2022-05-26 PROCEDURE — 77067 SCR MAMMO BI INCL CAD: CPT

## 2022-07-20 LAB
ALBUMIN SERPL-MCNC: 4.5 G/DL (ref 3.8–4.9)
ALBUMIN/CREAT UR: 29 MG/G CREAT (ref 0–29)
ALBUMIN/GLOB SERPL: 1.5 {RATIO} (ref 1.2–2.2)
ALP SERPL-CCNC: 73 IU/L (ref 44–121)
ALT SERPL-CCNC: 24 IU/L (ref 0–32)
AST SERPL-CCNC: 22 IU/L (ref 0–40)
BASOPHILS # BLD AUTO: 0.1 X10E3/UL (ref 0–0.2)
BASOPHILS NFR BLD AUTO: 1 %
BILIRUB SERPL-MCNC: 0.2 MG/DL (ref 0–1.2)
BUN SERPL-MCNC: 18 MG/DL (ref 6–24)
BUN/CREAT SERPL: 24 (ref 9–23)
CALCIUM SERPL-MCNC: 9.5 MG/DL (ref 8.7–10.2)
CHLORIDE SERPL-SCNC: 107 MMOL/L (ref 96–106)
CHOLEST SERPL-MCNC: 161 MG/DL (ref 100–199)
CO2 SERPL-SCNC: 20 MMOL/L (ref 20–29)
CREAT SERPL-MCNC: 0.75 MG/DL (ref 0.57–1)
CREAT UR-MCNC: 97.4 MG/DL
EGFR: 92 ML/MIN/1.73
EOSINOPHIL # BLD AUTO: 0.5 X10E3/UL (ref 0–0.4)
EOSINOPHIL NFR BLD AUTO: 8 %
ERYTHROCYTE [DISTWIDTH] IN BLOOD BY AUTOMATED COUNT: 12.5 % (ref 11.7–15.4)
GLOBULIN SER-MCNC: 3 G/DL (ref 1.5–4.5)
GLUCOSE SERPL-MCNC: 97 MG/DL (ref 65–99)
HBA1C MFR BLD: 6 % (ref 4.8–5.6)
HCT VFR BLD AUTO: 41.9 % (ref 34–46.6)
HDLC SERPL-MCNC: 38 MG/DL
HGB BLD-MCNC: 14.4 G/DL (ref 11.1–15.9)
IMM GRANULOCYTES # BLD: 0 X10E3/UL (ref 0–0.1)
IMM GRANULOCYTES NFR BLD: 1 %
LDLC SERPL CALC-MCNC: 100 MG/DL (ref 0–99)
LYMPHOCYTES # BLD AUTO: 2.4 X10E3/UL (ref 0.7–3.1)
LYMPHOCYTES NFR BLD AUTO: 39 %
MCH RBC QN AUTO: 31.1 PG (ref 26.6–33)
MCHC RBC AUTO-ENTMCNC: 34.4 G/DL (ref 31.5–35.7)
MCV RBC AUTO: 91 FL (ref 79–97)
MICROALBUMIN UR-MCNC: 28.5 UG/ML
MONOCYTES # BLD AUTO: 0.6 X10E3/UL (ref 0.1–0.9)
MONOCYTES NFR BLD AUTO: 10 %
NEUTROPHILS # BLD AUTO: 2.5 X10E3/UL (ref 1.4–7)
NEUTROPHILS NFR BLD AUTO: 41 %
PLATELET # BLD AUTO: 275 X10E3/UL (ref 150–450)
POTASSIUM SERPL-SCNC: 4.3 MMOL/L (ref 3.5–5.2)
PROT SERPL-MCNC: 7.5 G/DL (ref 6–8.5)
RBC # BLD AUTO: 4.63 X10E6/UL (ref 3.77–5.28)
SL AMB VLDL CHOLESTEROL CALC: 23 MG/DL (ref 5–40)
SODIUM SERPL-SCNC: 141 MMOL/L (ref 134–144)
T4 FREE SERPL-MCNC: 1.78 NG/DL (ref 0.82–1.77)
TRIGL SERPL-MCNC: 128 MG/DL (ref 0–149)
TSH SERPL DL<=0.005 MIU/L-ACNC: 0.38 UIU/ML (ref 0.45–4.5)
WBC # BLD AUTO: 6.1 X10E3/UL (ref 3.4–10.8)

## 2022-08-08 DIAGNOSIS — M79.672 LEFT FOOT PAIN: Primary | ICD-10-CM

## 2022-08-18 ENCOUNTER — OFFICE VISIT (OUTPATIENT)
Dept: GYNECOLOGIC ONCOLOGY | Facility: HOSPITAL | Age: 62
End: 2022-08-18
Payer: COMMERCIAL

## 2022-08-18 VITALS
DIASTOLIC BLOOD PRESSURE: 72 MMHG | HEIGHT: 61 IN | BODY MASS INDEX: 35.91 KG/M2 | TEMPERATURE: 97.6 F | WEIGHT: 190.2 LBS | SYSTOLIC BLOOD PRESSURE: 120 MMHG

## 2022-08-18 DIAGNOSIS — Z85.42 ENCOUNTER FOR FOLLOW-UP SURVEILLANCE OF ENDOMETRIAL CANCER: ICD-10-CM

## 2022-08-18 DIAGNOSIS — Z08 ENCOUNTER FOR FOLLOW-UP SURVEILLANCE OF ENDOMETRIAL CANCER: ICD-10-CM

## 2022-08-18 DIAGNOSIS — Z85.42 HISTORY OF ENDOMETRIAL CANCER: Primary | ICD-10-CM

## 2022-08-18 PROCEDURE — 3074F SYST BP LT 130 MM HG: CPT | Performed by: PHYSICIAN ASSISTANT

## 2022-08-18 PROCEDURE — 99212 OFFICE O/P EST SF 10 MIN: CPT | Performed by: PHYSICIAN ASSISTANT

## 2022-08-18 PROCEDURE — 3078F DIAST BP <80 MM HG: CPT | Performed by: PHYSICIAN ASSISTANT

## 2022-08-18 NOTE — ASSESSMENT & PLAN NOTE
History of stage IA, grade 1 endometrial cancer s/p surgical resection in June 2020  She is clinically without evidence of disease recurrence        Return to the office in 1 year for continued cancer surveillance

## 2022-08-18 NOTE — PROGRESS NOTES
Assessment/Plan:    Problem List Items Addressed This Visit        Other    History of endometrial cancer - Primary     History of stage IA, grade 1 endometrial cancer s/p surgical resection in June 2020  She is clinically without evidence of disease recurrence        Return to the office in 1 year for continued cancer surveillance  Encounter for follow-up surveillance of endometrial cancer            CHIEF COMPLAINT:   Endometrial cancer surveillance    Problem:  Cancer Staging  History of endometrial cancer  Staging form: Corpus Uteri - Carcinoma, AJCC 8th Edition  - Clinical: FIGO Stage IA - Signed by Sunni Parker PA-C on 6/10/2020        Previous therapy:  Oncology History   History of endometrial cancer   5/28/2020 Surgery    Robotic assisted TLH, BSO, sentinel LND and cystoscopy   A  Grade 1  B  No myometrial invasion, no LVSI  C  2 negative lymph nodes  D  IHC, all proteins intact  6/10/2020 -  Cancer Staged    Staging form: Corpus Uteri - Carcinoma, AJCC 8th Edition  - Clinical: FIGO Stage IA - Signed by Sunni Parker PA-C on 6/10/2020  Histologic grade (G): G1  Histologic grading system: 3 grade system             Patient ID: Yoselyn Collins is a 63 y o  female  who has no new complaints today  No vaginal bleeding, abdominal/pelvic pain  Normal bowel and bladder function  No interval change in medical history since last visit  Quality of life is good  The following portions of the patient's history were reviewed and updated as appropriate: allergies, current medications, past medical history, past surgical history and problem list     Review of Systems   Constitutional: Negative  HENT: Negative  Eyes: Negative  Respiratory: Negative  Cardiovascular: Negative  Gastrointestinal: Negative  Genitourinary: Negative  Musculoskeletal: Negative  Skin: Negative  Neurological: Negative  Psychiatric/Behavioral: Negative          Current Outpatient Medications Medication Sig Dispense Refill    albuterol (PROVENTIL HFA,VENTOLIN HFA) 90 mcg/act inhaler Inhale 2 puffs every 4 (four) hours as needed for wheezing or shortness of breath 18 g 5    Apremilast (Otezla) 30 MG TABS Take 1 tablet by mouth daily      cholecalciferol (VITAMIN D3) 1,000 units tablet Take 1 tablet by mouth daily      fenofibrate micronized (LOFIBRA) 200 MG capsule Take 1 capsule (200 mg total) by mouth daily 90 capsule 1    levothyroxine 200 mcg tablet TAKE 1 TABLET BY MOUTH ONCE DAILY EXCEPT  FOR 1 AND 1/2(ONE AND A HALF TABLET) ONCE  WEEKLY  AS  DIRECTED 90 tablet 0    lisinopril (ZESTRIL) 30 mg tablet Take 1 tablet by mouth once daily 90 tablet 0    metFORMIN (GLUCOPHAGE-XR) 500 mg 24 hr tablet TAKE 1 TABLET BY MOUTH IN THE MORNING 90 tablet 0    pravastatin (PRAVACHOL) 20 mg tablet Take 1 tablet by mouth once daily 90 tablet 0    Taltz 80 MG/ML SOAJ       triamcinolone (KENALOG) 0 1 % cream APPLY TO AFFECTED AREA(S) TWICE DAILY AS NEEDED       No current facility-administered medications for this visit  Objective:    Blood pressure 120/72, temperature 97 6 °F (36 4 °C), temperature source Tympanic, height 5' 1" (1 549 m), weight 86 3 kg (190 lb 3 2 oz), last menstrual period 04/20/2020, not currently breastfeeding  Body mass index is 35 94 kg/m²  Body surface area is 1 85 meters squared  Physical Exam  Vitals reviewed  Exam conducted with a chaperone present  Constitutional:       General: She is not in acute distress  Appearance: Normal appearance  She is not ill-appearing  HENT:      Head: Normocephalic and atraumatic  Mouth/Throat:      Mouth: Mucous membranes are moist    Eyes:      General:         Right eye: No discharge  Left eye: No discharge  Conjunctiva/sclera: Conjunctivae normal    Pulmonary:      Effort: Pulmonary effort is normal    Abdominal:      Palpations: Abdomen is soft  There is no mass  Tenderness:  There is no abdominal tenderness  Hernia: No hernia is present  Genitourinary:     Comments: The external female genitalia is normal  The bartholin's, uretheral and skenes glands are normal  The urethral meatus is normal (midline with no lesions)  Anus without fissure or lesion  Speculum exam reveals a grossly normal vagina  No masses, lesions,discharge or bleeding  No significant cystocele or rectocele noted  Bimanual exam notes a surgical absent cervix, uterus and adnexal structures  No masses or fullness  Bladder is without fullness, mass or tenderness  Musculoskeletal:      Right lower leg: No edema  Left lower leg: No edema  Skin:     General: Skin is warm and dry  Coloration: Skin is not jaundiced  Findings: No rash  Neurological:      General: No focal deficit present  Mental Status: She is alert and oriented to person, place, and time  Cranial Nerves: No cranial nerve deficit  Sensory: No sensory deficit  Motor: No weakness  Gait: Gait normal    Psychiatric:         Mood and Affect: Mood normal          Behavior: Behavior normal          Thought Content:  Thought content normal          Judgment: Judgment normal

## 2022-08-26 DIAGNOSIS — E11.9 TYPE 2 DIABETES MELLITUS WITHOUT COMPLICATION, WITHOUT LONG-TERM CURRENT USE OF INSULIN (HCC): ICD-10-CM

## 2022-08-26 DIAGNOSIS — I10 ESSENTIAL HYPERTENSION: ICD-10-CM

## 2022-08-26 DIAGNOSIS — E03.8 OTHER SPECIFIED HYPOTHYROIDISM: ICD-10-CM

## 2022-08-26 DIAGNOSIS — E78.1 ESSENTIAL HYPERTRIGLYCERIDEMIA: ICD-10-CM

## 2022-08-26 DIAGNOSIS — E78.5 DYSLIPIDEMIA: ICD-10-CM

## 2022-08-27 PROCEDURE — 4010F ACE/ARB THERAPY RXD/TAKEN: CPT | Performed by: PHYSICIAN ASSISTANT

## 2022-08-27 RX ORDER — LEVOTHYROXINE SODIUM 200 UG/1
TABLET ORAL
Qty: 90 TABLET | Refills: 0 | Status: SHIPPED | OUTPATIENT
Start: 2022-08-27

## 2022-08-27 RX ORDER — FENOFIBRATE 200 MG/1
CAPSULE ORAL
Qty: 90 CAPSULE | Refills: 0 | Status: SHIPPED | OUTPATIENT
Start: 2022-08-27

## 2022-08-27 RX ORDER — METFORMIN HYDROCHLORIDE 500 MG/1
TABLET, EXTENDED RELEASE ORAL
Qty: 180 TABLET | Refills: 0 | Status: SHIPPED | OUTPATIENT
Start: 2022-08-27 | End: 2022-09-01 | Stop reason: SDUPTHER

## 2022-08-27 RX ORDER — PRAVASTATIN SODIUM 20 MG
TABLET ORAL
Qty: 90 TABLET | Refills: 0 | Status: SHIPPED | OUTPATIENT
Start: 2022-08-27

## 2022-08-27 RX ORDER — LISINOPRIL 30 MG/1
TABLET ORAL
Qty: 90 TABLET | Refills: 0 | Status: SHIPPED | OUTPATIENT
Start: 2022-08-27

## 2022-09-01 ENCOUNTER — OFFICE VISIT (OUTPATIENT)
Dept: FAMILY MEDICINE CLINIC | Facility: HOSPITAL | Age: 62
End: 2022-09-01
Payer: COMMERCIAL

## 2022-09-01 VITALS
WEIGHT: 193.4 LBS | HEART RATE: 86 BPM | DIASTOLIC BLOOD PRESSURE: 72 MMHG | HEIGHT: 61 IN | SYSTOLIC BLOOD PRESSURE: 114 MMHG | TEMPERATURE: 98.3 F | BODY MASS INDEX: 36.51 KG/M2

## 2022-09-01 DIAGNOSIS — R79.89 LOW TSH LEVEL: ICD-10-CM

## 2022-09-01 DIAGNOSIS — E11.9 TYPE 2 DIABETES MELLITUS WITHOUT COMPLICATION, WITHOUT LONG-TERM CURRENT USE OF INSULIN (HCC): ICD-10-CM

## 2022-09-01 DIAGNOSIS — E11.9 CONTROLLED TYPE 2 DIABETES MELLITUS WITHOUT COMPLICATION, WITHOUT LONG-TERM CURRENT USE OF INSULIN (HCC): Primary | ICD-10-CM

## 2022-09-01 DIAGNOSIS — R80.9 MICROALBUMINURIA DUE TO TYPE 2 DIABETES MELLITUS (HCC): ICD-10-CM

## 2022-09-01 DIAGNOSIS — Z23 ENCOUNTER FOR IMMUNIZATION: ICD-10-CM

## 2022-09-01 DIAGNOSIS — E03.8 OTHER SPECIFIED HYPOTHYROIDISM: ICD-10-CM

## 2022-09-01 DIAGNOSIS — E78.5 DYSLIPIDEMIA: ICD-10-CM

## 2022-09-01 DIAGNOSIS — E11.29 MICROALBUMINURIA DUE TO TYPE 2 DIABETES MELLITUS (HCC): ICD-10-CM

## 2022-09-01 DIAGNOSIS — I10 ESSENTIAL HYPERTENSION: ICD-10-CM

## 2022-09-01 PROCEDURE — 3074F SYST BP LT 130 MM HG: CPT | Performed by: INTERNAL MEDICINE

## 2022-09-01 PROCEDURE — 3066F NEPHROPATHY DOC TX: CPT | Performed by: INTERNAL MEDICINE

## 2022-09-01 PROCEDURE — 99214 OFFICE O/P EST MOD 30 MIN: CPT | Performed by: INTERNAL MEDICINE

## 2022-09-01 PROCEDURE — 90677 PCV20 VACCINE IM: CPT

## 2022-09-01 PROCEDURE — 90471 IMMUNIZATION ADMIN: CPT

## 2022-09-01 PROCEDURE — 3078F DIAST BP <80 MM HG: CPT | Performed by: INTERNAL MEDICINE

## 2022-09-01 PROCEDURE — 3725F SCREEN DEPRESSION PERFORMED: CPT | Performed by: INTERNAL MEDICINE

## 2022-09-01 RX ORDER — METFORMIN HYDROCHLORIDE 500 MG/1
TABLET, EXTENDED RELEASE ORAL
Qty: 90 TABLET | Refills: 0 | Status: SHIPPED | OUTPATIENT
Start: 2022-09-01

## 2022-09-01 NOTE — PROGRESS NOTES
Assessment/Plan:    Controlled type 2 diabetes mellitus without complication, without long-term current use of insulin ([facility])    Lab Results   Component Value Date    HGBA1C 6 0 (H) 07/19/2022   DM type 2 without complications - controlled with A1C 6 0 - con't current medication, con't low sugar/carb diet and keep active, recheck BW in 6 mos  - order given, UTD on foot exam (2/22) and eye exam (3/21 - 2 yrs), on ACE and statin, will follow    Microalbuminuria due to type 2 diabetes mellitus ([facility] )  Most recent microalbumin:Cr ratio was nml!, con't current statin, again encouraged to con't with BP/BS control  Lab Results   Component Value Date    HGBA1C 6 0 (H) 07/19/2022       Other specified hypothyroidism  TSh now low and FT4 high - pt admits to taking thyroid meds with all her meds/vitamins in the am, will switch to pm BY ITSELF and not eat within an hour as well, con't current dose for now and recheck TFT's in 6 wks - order given    Essential hypertension  BP at goal, con't current meds    Dyslipidemia  LDL borderline at 100, TG at goal, HDL low - cont' current statin and fibrate for now, encouraged diet/exercise and recheck FLP annually       Diagnoses and all orders for this visit:    Controlled type 2 diabetes mellitus without complication, without long-term current use of insulin ([facility] )  -     Hemoglobin A1C; Future  -     Basic metabolic panel; Future    Microalbuminuria due to type 2 diabetes mellitus ([facility] )    Dyslipidemia    Other specified hypothyroidism  -     TSH, 3rd generation with Free T4 reflex; Future    Essential hypertension  -     Basic metabolic panel; Future    Type 2 diabetes mellitus without complication, without long-term current use of insulin ([facility])  -     metFORMIN (GLUCOPHAGE-XR) 500 mg 24 hr tablet; TAKE 1 TABLET BY MOUTH IN THE MORNING    Low TSH level  -     TSH, 3rd generation with Free T4 reflex;  Future      Colonoscopy 9/14 - 10 yrs    Mammo 5/22    PAP s/p hysterectomy    Pt is agreeable to Prevnar 20    Retiring the end of the month        Subjective:      Patient ID: Kelli Gutierrez is a 63 y o  female  HPI Pt here for follow up appt and BW results    BW results were d/w pt in detail: FBS/A1C 97/6 0, rest of CMP/urine microalbumin:Cr ratio were wnl, TSH low at 0 377 and FT4 was elevated at 1 78, FLP with  and HDL 38, TG and TC wnl  Def of controlled vs uncontrolled DM was reviewed  Diet was reviewed - wgt down 3 lbs from Feb 22  She has not been checking her sugars daily  She did yesterday and it was 98 (nonfasting)  She is taking her DM meds as directed  She is UTD on DM foot exam (2/22) and eye exam (3/21 - 2 yrs)  She is on statin and ACE  Goal FLP was d/w pt in detail  Diet/exercise reviewed as noted above  She is taking her Pravastatin daily as directed w/o Se  She notes no stroke/TIA symptoms/CP  Pt is taking their thyroid medication daily but with all her other meds and was eating after  They deny any significant wgt changes/fatigue/C/D/tremor/palp/skin changes  She has had some hair loss  BP at goal today and meds were reviewed and no changes have occurred  She denies missing doses of meds or SE with the meds  She does not check her BP outside the office  She notes no frequent HA's/dizziness/double vision/CP  Review of Systems   Constitutional: Negative for chills and fever  HENT: Negative for congestion and trouble swallowing  Eyes: Negative for pain and visual disturbance  Respiratory: Negative for cough, shortness of breath and wheezing  Cardiovascular: Negative for chest pain and palpitations  Gastrointestinal: Negative for abdominal pain, blood in stool, constipation, diarrhea and nausea  Genitourinary: Negative for difficulty urinating and dysuria  Musculoskeletal: Negative for back pain and neck pain  Skin: Positive for rash  Negative for wound          Psoriasis back as Derm decreased Taltz to once a month Neurological: Negative for dizziness, light-headedness and headaches  Hematological: Does not bruise/bleed easily  Psychiatric/Behavioral: Negative for dysphoric mood  The patient is not nervous/anxious  Objective:    /72   Pulse 86   Temp 98 3 °F (36 8 °C) (Tympanic)   Ht 5' 1" (1 549 m)   Wt 87 7 kg (193 lb 6 4 oz)   LMP 04/20/2020   BMI 36 54 kg/m²      Physical Exam  Vitals and nursing note reviewed  Constitutional:       General: She is not in acute distress  Appearance: She is well-developed  She is not ill-appearing  HENT:      Head: Normocephalic and atraumatic  Eyes:      General:         Right eye: No discharge  Left eye: No discharge  Conjunctiva/sclera: Conjunctivae normal    Neck:      Trachea: No tracheal deviation  Cardiovascular:      Rate and Rhythm: Normal rate and regular rhythm  Heart sounds: Normal heart sounds  No murmur heard  No friction rub  Pulmonary:      Effort: Pulmonary effort is normal  No respiratory distress  Breath sounds: Normal breath sounds  No wheezing, rhonchi or rales  Abdominal:      General: There is no distension  Palpations: Abdomen is soft  Tenderness: There is no abdominal tenderness  There is no guarding or rebound  Musculoskeletal:         General: No deformity or signs of injury  Cervical back: Neck supple  Skin:     General: Skin is warm  Coloration: Skin is not pale  Findings: Rash present  Comments: Psoriatic plaques on forearms and shins   Neurological:      General: No focal deficit present  Mental Status: She is alert  Mental status is at baseline  Motor: No abnormal muscle tone  Psychiatric:         Mood and Affect: Mood normal          Behavior: Behavior normal          Thought Content:  Thought content normal          Judgment: Judgment normal

## 2022-09-01 NOTE — ASSESSMENT & PLAN NOTE
LDL borderline at 100, TG at goal, HDL low - cont' current statin and fibrate for now, encouraged diet/exercise and recheck FLP annually

## 2022-09-01 NOTE — ASSESSMENT & PLAN NOTE
TSh now low and FT4 high - pt admits to taking thyroid meds with all her meds/vitamins in the am, will switch to pm BY ITSELF and not eat within an hour as well, con't current dose for now and recheck TFT's in 6 wks - order given

## 2022-09-01 NOTE — ASSESSMENT & PLAN NOTE
Most recent microalbumin:Cr ratio was nml!, con't current statin, again encouraged to con't with BP/BS control  Lab Results   Component Value Date    HGBA1C 6 0 (H) 07/19/2022

## 2022-09-01 NOTE — ASSESSMENT & PLAN NOTE
Lab Results   Component Value Date    HGBA1C 6 0 (H) 07/19/2022   DM type 2 without complications - controlled with A1C 6 0 - con't current medication, con't low sugar/carb diet and keep active, recheck BW in 6 mos  - order given, UTD on foot exam (2/22) and eye exam (3/21 - 2 yrs), on ACE and statin, will follow

## 2022-11-08 ENCOUNTER — APPOINTMENT (OUTPATIENT)
Dept: LAB | Facility: HOSPITAL | Age: 62
End: 2022-11-08

## 2022-11-08 DIAGNOSIS — R79.89 LOW TSH LEVEL: ICD-10-CM

## 2022-11-08 DIAGNOSIS — E03.8 OTHER SPECIFIED HYPOTHYROIDISM: ICD-10-CM

## 2022-11-08 LAB — TSH SERPL DL<=0.05 MIU/L-ACNC: 2.26 UIU/ML (ref 0.45–4.5)

## 2022-12-12 DIAGNOSIS — E78.1 ESSENTIAL HYPERTRIGLYCERIDEMIA: ICD-10-CM

## 2022-12-12 DIAGNOSIS — E11.9 TYPE 2 DIABETES MELLITUS WITHOUT COMPLICATION, WITHOUT LONG-TERM CURRENT USE OF INSULIN (HCC): ICD-10-CM

## 2022-12-12 DIAGNOSIS — I10 ESSENTIAL HYPERTENSION: ICD-10-CM

## 2022-12-12 DIAGNOSIS — E03.8 OTHER SPECIFIED HYPOTHYROIDISM: ICD-10-CM

## 2022-12-12 DIAGNOSIS — E78.5 DYSLIPIDEMIA: ICD-10-CM

## 2022-12-12 RX ORDER — PRAVASTATIN SODIUM 20 MG
TABLET ORAL
Qty: 90 TABLET | Refills: 0 | Status: SHIPPED | OUTPATIENT
Start: 2022-12-12

## 2022-12-12 RX ORDER — LISINOPRIL 30 MG/1
TABLET ORAL
Qty: 90 TABLET | Refills: 0 | Status: SHIPPED | OUTPATIENT
Start: 2022-12-12

## 2022-12-12 RX ORDER — LEVOTHYROXINE SODIUM 0.2 MG/1
TABLET ORAL
Qty: 90 TABLET | Refills: 0 | Status: SHIPPED | OUTPATIENT
Start: 2022-12-12

## 2022-12-12 RX ORDER — FENOFIBRATE 200 MG/1
CAPSULE ORAL
Qty: 90 CAPSULE | Refills: 0 | Status: SHIPPED | OUTPATIENT
Start: 2022-12-12

## 2022-12-30 ENCOUNTER — OFFICE VISIT (OUTPATIENT)
Dept: FAMILY MEDICINE CLINIC | Facility: HOSPITAL | Age: 62
End: 2022-12-30

## 2022-12-30 VITALS
TEMPERATURE: 97.7 F | WEIGHT: 209 LBS | HEART RATE: 92 BPM | DIASTOLIC BLOOD PRESSURE: 84 MMHG | OXYGEN SATURATION: 97 % | HEIGHT: 61 IN | BODY MASS INDEX: 39.46 KG/M2 | SYSTOLIC BLOOD PRESSURE: 126 MMHG

## 2022-12-30 DIAGNOSIS — J45.40 MODERATE PERSISTENT ASTHMA WITHOUT COMPLICATION: ICD-10-CM

## 2022-12-30 DIAGNOSIS — E11.9 CONTROLLED TYPE 2 DIABETES MELLITUS WITHOUT COMPLICATION, WITHOUT LONG-TERM CURRENT USE OF INSULIN (HCC): ICD-10-CM

## 2022-12-30 RX ORDER — ALBUTEROL SULFATE 90 UG/1
2 AEROSOL, METERED RESPIRATORY (INHALATION) EVERY 4 HOURS PRN
Qty: 18 G | Refills: 0 | Status: SHIPPED | OUTPATIENT
Start: 2022-12-30

## 2022-12-30 NOTE — ASSESSMENT & PLAN NOTE
She is requesting wegovy rx for weight loss  Reviewed/discussed previous mychart messages w/PCP Dr Mary Braswell and reiterated her hesitation to rx given pt's other diabetic meds and concern for hypoglycemia SE  Told patient I will defer to Dr Mary Braswell and she will hear from office next week when Dr Mary Braswell back in office

## 2022-12-30 NOTE — PROGRESS NOTES
Name: Marielle Yousif      : 1960      MRN: [id number]  Encounter Provider: REYMUNDO Etienne  Encounter Date: 2022   [facility] [address]  BMI 39 0-39 9,adult  Assessment & Plan:  She is requesting wegovy rx for weight loss  Reviewed/discussed previous [employer] messages w/PCP Dr Bibi Coreas and reiterated her hesitation to rx given pt's other diabetic meds and concern for hypoglycemia SE  Told patient I will defer to Dr Bibi Coreas and she will hear from office next week when Dr Bibi Coreas back in office  2  Moderate persistent asthma without complication  Assessment & Plan:  Albuterol refill issued as she requests    Orders:  -     albuterol (PROVENTIL HFA,VENTOLIN HFA) 90 mcg/act inhaler; Inhale 2 puffs every 4 (four) hours as needed for wheezing or shortness of breath    3  Controlled type 2 diabetes mellitus without complication, without long-term current use of insulin ([facility])         Subjective      States she messaged with Dr Bibi Coreas about 3 weeks ago to start [facility] for weight loss  States she has gained 14 pounds in 2 months and requests med to lose weight  States she has not changed her diet but continues to gain weight  Per previous [employer] messages reviewed and discussed w/pt, Dr Bibi Coreas had concerns re: hypoglycemic and other side effects and was hesitant to prescribe  Review of Systems   Constitutional: Positive for unexpected weight change (gaining weight)         Current Outpatient Medications on File Prior to Visit   Medication Sig   • cholecalciferol (VITAMIN D3) 1,000 units tablet Take 1 tablet by mouth daily   • fenofibrate micronized (LOFIBRA) 200 MG capsule Take 1 capsule by mouth once daily   • levothyroxine 200 mcg tablet TAKE 1 TABLET BY MOUTH ONCE DAILY EXCEPT  FOR  1  AND  1/2  TABLETS  ONCE  WEEKLY  AS  DIRECTED   • lisinopril (ZESTRIL) 30 mg tablet Take 1 tablet by mouth once daily   • metFORMIN (GLUCOPHAGE-XR) 500 mg 24 hr tablet TAKE 1 TABLET BY MOUTH IN THE MORNING   • pravastatin (PRAVACHOL) 20 mg tablet Take 1 tablet by mouth once daily   • Taltz 80 MG/ML SOAJ    • triamcinolone (KENALOG) 0 1 % cream APPLY TO AFFECTED AREA(S) TWICE DAILY AS NEEDED   • [DISCONTINUED] albuterol (PROVENTIL HFA,VENTOLIN HFA) 90 mcg/act inhaler Inhale 2 puffs every 4 (four) hours as needed for wheezing or shortness of breath   • [DISCONTINUED] Apremilast (Otezla) 30 MG TABS Take 1 tablet by mouth daily       Objective     /84 (Patient Position: Sitting, Cuff Size: Large)   Pulse 92   Temp 97 7 °F (36 5 °C) (Tympanic)   Ht 5' 1" (1 549 m)   Wt 94 8 kg (209 lb)   LMP 04/20/2020   SpO2 97%   BMI 39 49 kg/m²       Physical Exam  Vitals reviewed  Constitutional:       General: She is not in acute distress  Appearance: Normal appearance  She is obese  Pulmonary:      Effort: Pulmonary effort is normal  No respiratory distress  Neurological:      General: No focal deficit present  Mental Status: She is alert and oriented to person, place, and time  Psychiatric:         Attention and Perception: Attention normal         REYMUNDO Anthony       BMI Counseling: Body mass index is 39 49 kg/m²  The BMI is above normal  Nutrition recommendations include 3-5 servings of fruits/vegetables daily, moderation in carbohydrate intake and reducing intake of cholesterol  Exercise recommendations include exercising 3-5 times per week

## 2022-12-30 NOTE — PATIENT INSTRUCTIONS
Obesity   AMBULATORY CARE:   Obesity  means your body mass index (BMI) is greater than 30  Your healthcare provider will use your height and weight to measure your BMI  The risks of obesity include  many health problems, including injuries or physical disability  · Diabetes (high blood sugar level)    · High blood pressure or high cholesterol    · Heart disease    · Stroke    · Gallbladder or liver disease    · Cancer of the colon, breast, prostate, liver, or kidney    · Sleep apnea    · Arthritis or gout    Screening  is done to check for health conditions before you have signs or symptoms  If you are 28to 79years old, your blood sugar level may be checked every 3 years for signs of prediabetes or diabetes  Your healthcare provider will check your blood pressure at each visit  High blood pressure can lead to a stroke or other problems  Your provider may check for signs of heart disease, cancer, or other health problems  Seek care immediately if:   · You have a severe headache, confusion, or difficulty speaking  · You have weakness on one side of your body  · You have chest pain, sweating, or shortness of breath  Call your doctor if:   · You have symptoms of gallbladder or liver disease, such as pain in your upper abdomen  · You have knee or hip pain and discomfort while walking  · You have symptoms of diabetes, such as intense hunger and thirst, and frequent urination  · You have symptoms of sleep apnea, such as snoring or daytime sleepiness  · You have questions or concerns about your condition or care  Treatment for obesity  focuses on helping you lose weight to improve your health  Even a small decrease in BMI can reduce the risk for many health problems  Your healthcare provider will help you set a weight-loss goal   · Lifestyle changes  are the first step in treating obesity  These include making healthy food choices and getting regular physical activity   Your healthcare provider may suggest a weight-loss program that involves coaching, education, and therapy  · Medicine  may help you lose weight when it is used with a healthy foods and physical activity  · Surgery  can help you lose weight if you are very obese and have other health problems  There are several types of weight-loss surgery  Ask your healthcare provider for more information  Tips for safe weight loss:   · Set small, realistic goals  An example of a small goal is to walk for 20 minutes 5 days a week  Anther goal is to lose 5% of your body weight  · Tell friends, family members, and coworkers about your goals  and ask for their support  Ask a friend to lose weight with you, or join a weight-loss support group  · Identify foods or triggers that may cause you to overeat , and find ways to avoid them  Remove tempting high-calorie foods from your home and workplace  Place a bowl of fresh fruit on your kitchen counter  If stress causes you to eat, then find other ways to cope with stress  A counselor or therapist may be able to help you  · Keep a diary to track what you eat and drink  Also write down how many minutes of physical activity you do each day  Weigh yourself once a week and record it in your diary  Eating changes: You will need to eat 500 to 1,000 fewer calories each day than you currently eat to lose 1 to 2 pounds a week  The following changes will help you cut calories:  · Eat smaller portions  Use small plates, no larger than 9 inches in diameter  Fill your plate half full of fruits and vegetables  Measure your food using measuring cups until you know what a serving size looks like  · Eat 3 meals and 1 or 2 snacks each day  Plan your meals in advance  [facility] and [facility] at [facility] most of the time  Eat slowly  Do not skip meals  Skipping meals can lead to overeating later in the day  This can make it harder for you to lose weight   Talk with a dietitian to help you make a meal plan and schedule that is right for you  · Eat fruits and vegetables at every meal   They are low in calories and high in fiber, which makes you feel full  Do not add butter, margarine, or cream sauce to vegetables  Use herbs to season steamed vegetables  · Eat less fat and fewer fried foods  Eat more baked or grilled chicken and fish  These protein sources are lower in calories and fat than red meat  Limit fast food  Dress your salads with olive oil and vinegar instead of bottled dressing  · Limit the amount of sugar you eat  Do not drink sugary beverages  Limit alcohol  Activity changes:  Physical activity is good for your body in many ways  It helps you burn calories and build strong muscles  It decreases stress and depression, and improves your mood  It can also help you sleep better  Talk to your healthcare provider before you begin an exercise program   · Exercise for at least 30 minutes 5 days a week  Start slowly  Set aside time each day for physical activity that you enjoy and that is convenient for you  It is best to do both weight training and an activity that increases your heart rate, such as walking, bicycling, or swimming  · Find ways to be more active  Do yard work and housecleaning  Walk up the stairs instead of using elevators  Spend your leisure time going to events that require walking, such as outdoor festivals or fairs  This extra physical activity can help you lose weight and keep it off  Follow up with your doctor as directed: You may need to meet with a dietitian  Write down your questions so you remember to ask them during your visits  © Copyright [employer] 2022 Information is for End User's use only and may not be sold, redistributed or otherwise used for commercial purposes  All illustrations and images included in CareNotes® are the copyrighted property of [employer]  or [employer]   The above information is an  only   It is not intended as medical advice for individual conditions or treatments  Talk to your doctor, nurse or pharmacist before following any medical regimen to see if it is safe and effective for you

## 2023-01-03 ENCOUNTER — TELEPHONE (OUTPATIENT)
Dept: FAMILY MEDICINE CLINIC | Facility: HOSPITAL | Age: 63
End: 2023-01-03

## 2023-01-03 DIAGNOSIS — E11.9 CONTROLLED TYPE 2 DIABETES MELLITUS WITHOUT COMPLICATION, WITHOUT LONG-TERM CURRENT USE OF INSULIN (HCC): Primary | ICD-10-CM

## 2023-01-03 NOTE — TELEPHONE ENCOUNTER
----- Message from Katlyn Sandoval DO sent at 1/3/2023  9:40 AM EST -----  Regarding: RE: wegovy  Please call pt and advise I can try and send a rx for Ozempic/Wegovy (same medication) for her obesity and diabetes  Again my concern is hypoglycemia  If she insists on taking the rx we would stop the Metformin and start the injectable if it is approved by the insurance and she can find it - on back order at [facility]  Let me know how she wishes to proceed  ----- Message -----  From: REYMUNDO Hinkle  Sent: 12/30/2022  10:47 AM EST  To: Katlyn Sandoval DO  Subject: Mendoza Jacobo was scheduled with me today for request to start wegovy  You previously messaged her with hesitation given possible hypoglycemia and other side effects  I reiterated these same concerns to her and also explained I would not prescribe for her since I don't manage her other meds and health conditions  I told her I would defer back to you to rx if appropriate, and told her she would hear from the office next week   Thank you, Micki Mcneil

## 2023-01-03 NOTE — TELEPHONE ENCOUNTER
Pt aware and wishes to go forward with Ozempic, aware she should stop metformin if she gets approved for this medication

## 2023-03-14 LAB
LEFT EYE DIABETIC RETINOPATHY: NORMAL
RIGHT EYE DIABETIC RETINOPATHY: NORMAL

## 2023-03-14 PROCEDURE — 2023F DILAT RTA XM W/O RTNOPTHY: CPT | Performed by: INTERNAL MEDICINE

## 2023-03-19 DIAGNOSIS — E03.8 OTHER SPECIFIED HYPOTHYROIDISM: ICD-10-CM

## 2023-03-19 RX ORDER — LEVOTHYROXINE SODIUM 0.2 MG/1
TABLET ORAL
Qty: 90 TABLET | Refills: 0 | Status: SHIPPED | OUTPATIENT
Start: 2023-03-19

## 2023-03-22 ENCOUNTER — APPOINTMENT (OUTPATIENT)
Dept: LAB | Facility: HOSPITAL | Age: 63
End: 2023-03-22

## 2023-03-22 DIAGNOSIS — I10 ESSENTIAL HYPERTENSION: ICD-10-CM

## 2023-03-22 DIAGNOSIS — E11.9 CONTROLLED TYPE 2 DIABETES MELLITUS WITHOUT COMPLICATION, WITHOUT LONG-TERM CURRENT USE OF INSULIN (HCC): ICD-10-CM

## 2023-03-22 LAB
ANION GAP SERPL CALCULATED.3IONS-SCNC: 0 MMOL/L (ref 4–13)
BUN SERPL-MCNC: 17 MG/DL (ref 5–25)
CALCIUM SERPL-MCNC: 9.2 MG/DL (ref 8.3–10.1)
CHLORIDE SERPL-SCNC: 112 MMOL/L (ref 96–108)
CO2 SERPL-SCNC: 27 MMOL/L (ref 21–32)
CREAT SERPL-MCNC: 0.8 MG/DL (ref 0.6–1.3)
EST. AVERAGE GLUCOSE BLD GHB EST-MCNC: 120 MG/DL
GFR SERPL CREATININE-BSD FRML MDRD: 80 ML/MIN/1.73SQ M
GLUCOSE P FAST SERPL-MCNC: 98 MG/DL (ref 65–99)
HBA1C MFR BLD: 5.8 %
POTASSIUM SERPL-SCNC: 4.3 MMOL/L (ref 3.5–5.3)
SODIUM SERPL-SCNC: 139 MMOL/L (ref 135–147)

## 2023-03-28 ENCOUNTER — OFFICE VISIT (OUTPATIENT)
Dept: FAMILY MEDICINE CLINIC | Facility: HOSPITAL | Age: 63
End: 2023-03-28

## 2023-03-28 VITALS
BODY MASS INDEX: 38.36 KG/M2 | DIASTOLIC BLOOD PRESSURE: 86 MMHG | HEIGHT: 61 IN | WEIGHT: 203.2 LBS | HEART RATE: 95 BPM | TEMPERATURE: 98.7 F | SYSTOLIC BLOOD PRESSURE: 126 MMHG

## 2023-03-28 DIAGNOSIS — Z00.00 ANNUAL PHYSICAL EXAM: Primary | ICD-10-CM

## 2023-03-28 DIAGNOSIS — Z23 ENCOUNTER FOR IMMUNIZATION: ICD-10-CM

## 2023-03-28 DIAGNOSIS — E11.9 CONTROLLED TYPE 2 DIABETES MELLITUS WITHOUT COMPLICATION, WITHOUT LONG-TERM CURRENT USE OF INSULIN (HCC): ICD-10-CM

## 2023-03-28 DIAGNOSIS — E78.5 DYSLIPIDEMIA: ICD-10-CM

## 2023-03-28 DIAGNOSIS — Z12.31 ENCOUNTER FOR SCREENING MAMMOGRAM FOR MALIGNANT NEOPLASM OF BREAST: ICD-10-CM

## 2023-03-28 DIAGNOSIS — E11.29 MICROALBUMINURIA DUE TO TYPE 2 DIABETES MELLITUS (HCC): ICD-10-CM

## 2023-03-28 DIAGNOSIS — R80.9 MICROALBUMINURIA DUE TO TYPE 2 DIABETES MELLITUS (HCC): ICD-10-CM

## 2023-03-28 DIAGNOSIS — I10 ESSENTIAL HYPERTENSION: ICD-10-CM

## 2023-03-28 DIAGNOSIS — E03.8 OTHER SPECIFIED HYPOTHYROIDISM: ICD-10-CM

## 2023-03-28 NOTE — PATIENT INSTRUCTIONS
Wellness Visit for Adults   AMBULATORY CARE:   A wellness visit  is when you see your healthcare provider to get screened for health problems  Your healthcare provider will also give you advice on how to stay healthy  Write down your questions so you remember to ask them  Ask your healthcare provider how often you should have a wellness visit  What happens at a wellness visit:  Your healthcare provider will ask about your health, and your family history of health problems  This includes high blood pressure, heart disease, and cancer  He or she will ask if you have symptoms that concern you, if you smoke, and about your mood  You may also be asked about your intake of medicines, supplements, food, and alcohol  Any of the following may be done:  • Your weight  will be checked  Your height may also be checked so your body mass index (BMI) can be calculated  Your BMI shows if you are at a healthy weight  • Your blood pressure  and heart rate will be checked  Your temperature may also be checked  • Blood and urine tests  may be done  Blood tests may be done to check your cholesterol levels  Abnormal cholesterol levels increase your risk for heart disease and stroke  You may also need a blood or urine test to check for diabetes if you are at increased risk  Urine tests may be done to look for signs of an infection or kidney disease  • A physical exam  includes checking your heartbeat and lungs with a stethoscope  Your healthcare provider may also check your skin to look for sun damage  • Screening tests  may be recommended  A screening test is done to check for diseases that may not cause symptoms  The screening tests you may need depend on your age, gender, family history, and lifestyle habits  For example, colorectal screening may be recommended if you are 48years old or older  Screening tests you need if you are a woman:   • A Pap smear  is used to screen for cervical cancer   Pap smears are usually done every 3 to 5 years depending on your age  You may need them more often if you have had abnormal Pap smear test results in the past  Ask your healthcare provider how often you should have a Pap smear  • A mammogram  is an x-ray of your breasts to screen for breast cancer  Experts recommend mammograms every 2 years starting at age 48 years  You may need a mammogram at age 52 years or younger if you have an increased risk for breast cancer  Talk to your healthcare provider about when you should start having mammograms and how often you need them  Vaccines you may need:   • Get an influenza vaccine  every year  The influenza vaccine protects you from the flu  Several types of viruses cause the flu  The viruses change over time, so new vaccines are made each year  • Get a tetanus-diphtheria (Td) booster vaccine  every 10 years  This vaccine protects you against tetanus and diphtheria  Tetanus is a severe infection that may cause painful muscle spasms and lockjaw  Diphtheria is a severe bacterial infection that causes a thick covering in the back of your mouth and throat  • Get a human papillomavirus (HPV) vaccine  if you are female and aged 23 to 32 or male 23 to 24 and never received it  This vaccine protects you from HPV infection  HPV is the most common infection spread by sexual contact  HPV may also cause vaginal, penile, and anal cancers  • Get a pneumococcal vaccine  if you are aged 72 years or older  The pneumococcal vaccine is an injection given to protect you from pneumococcal disease  Pneumococcal disease is an infection caused by pneumococcal bacteria  The infection may cause pneumonia, meningitis, or an ear infection  • Get a shingles vaccine  if you are 60 or older, even if you have had shingles before  The shingles vaccine is an injection to protect you from the varicella-zoster virus  This is the same virus that causes chickenpox   Shingles is a painful rash that develops in people who had chickenpox or have been exposed to the virus  How to eat healthy:  My Plate is a model for planning healthy meals  It shows the types and amounts of foods that should go on your plate  Fruits and vegetables make up about half of your plate, and grains and protein make up the other half  A serving of dairy is included on the side of your plate  The amount of calories and serving sizes you need depends on your age, gender, weight, and height  Examples of healthy foods are listed below:  • Eat a variety of vegetables  such as dark green, red, and orange vegetables  You can also include canned vegetables low in sodium (salt) and frozen vegetables without added butter or sauces  • Eat a variety of fresh fruits , canned fruit in 100% juice, frozen fruit, and dried fruit  • Include whole grains  At least half of the grains you eat should be whole grains  Examples include whole-wheat bread, wheat pasta, brown rice, and whole-grain cereals such as oatmeal     • Eat a variety of protein foods such as seafood (fish and shellfish), lean meat, and poultry without skin (turkey and chicken)  Examples of lean meats include pork leg, shoulder, or tenderloin, and beef round, sirloin, tenderloin, and extra lean ground beef  Other protein foods include eggs and egg substitutes, beans, peas, soy products, nuts, and seeds  • Choose low-fat dairy products such as skim or 1% milk or low-fat yogurt, cheese, and cottage cheese  • Limit unhealthy fats  such as butter, hard margarine, and shortening  Exercise:  Exercise at least 30 minutes per day on most days of the week  Some examples of exercise include walking, biking, dancing, and swimming  You can also fit in more physical activity by taking the stairs instead of the elevator or parking farther away from stores  Include muscle strengthening activities 2 days each week  Regular exercise provides many health benefits   It helps you manage your weight, and decreases your risk for type 2 diabetes, heart disease, stroke, and high blood pressure  Exercise can also help improve your mood  Ask your healthcare provider about the best exercise plan for you  General health and safety guidelines:   • Do not smoke  Nicotine and other chemicals in cigarettes and cigars can cause lung damage  Ask your healthcare provider for information if you currently smoke and need help to quit  E-cigarettes or smokeless tobacco still contain nicotine  Talk to your healthcare provider before you use these products  • Limit alcohol  A drink of alcohol is 12 ounces of beer, 5 ounces of wine, or 1½ ounces of liquor  • Lose weight, if needed  Being overweight increases your risk of certain health conditions  These include heart disease, high blood pressure, type 2 diabetes, and certain types of cancer  • Protect your skin  Do not sunbathe or use tanning beds  Use sunscreen with a SPF 15 or higher  Apply sunscreen at least 15 minutes before you go outside  Reapply sunscreen every 2 hours  Wear protective clothing, hats, and sunglasses when you are outside  • Drive safely  Always wear your seatbelt  Make sure everyone in your car wears a seatbelt  A seatbelt can save your life if you are in an accident  Do not use your cell phone when you are driving  This could distract you and cause an accident  Pull over if you need to make a call or send a text message  • Practice safe sex  Use latex condoms if are sexually active and have more than one partner  Your healthcare provider may recommend screening tests for sexually transmitted infections (STIs)  • Wear helmets, lifejackets, and protective gear  Always wear a helmet when you ride a bike or motorcycle, go skiing, or play sports that could cause a head injury  Wear protective equipment when you play sports  Wear a lifejacket when you are on a boat or doing water sports      © Copyright [employer] 2022 Information is for End User's use only and may not be sold, redistributed or otherwise used for commercial purposes  The above information is an  only  It is not intended as medical advice for individual conditions or treatments  Talk to your doctor, nurse or pharmacist before following any medical regimen to see if it is safe and effective for you  Obesity   AMBULATORY CARE:   Obesity  means your body mass index (BMI) is greater than 30  Your healthcare provider will use your age, height, and weight to measure your BMI  The risks of obesity include  many health problems, including injuries or physical disability  • Diabetes (high blood sugar level)    • High blood pressure or high cholesterol    • Heart disease    • Stroke    • Gallbladder or liver disease    • Cancer of the colon, breast, prostate, liver, or kidney    • Sleep apnea    • Arthritis or gout    Screening  is done to check for health conditions before you have signs or symptoms  If you are 28to 79years old, your blood sugar level may be checked every 3 years for signs of prediabetes or diabetes  Your healthcare provider will check your blood pressure at each visit  High blood pressure can lead to a stroke or other problems  Your provider may check for signs of heart disease, cancer, or other health problems  Seek care immediately if:   • You have a severe headache, confusion, or difficulty speaking  • You have weakness on one side of your body  • You have chest pain, sweating, or shortness of breath  Call your doctor if:   • You have symptoms of gallbladder or liver disease, such as pain in your upper abdomen  • You have knee or hip pain and discomfort while walking  • You have symptoms of diabetes, such as intense hunger and thirst, and frequent urination  • You have symptoms of sleep apnea, such as snoring or daytime sleepiness  • You have questions or concerns about your condition or care      Treatment for obesity  focuses on helping you lose weight to improve your health  Even a small decrease in BMI can reduce the risk for many health problems  Your healthcare provider will help you set a weight-loss goal   • Lifestyle changes  are the first step in treating obesity  These include making healthy food choices and getting regular physical activity  Your healthcare provider may suggest a weight-loss program that involves coaching, education, and therapy  • Medicine  may help you lose weight when it is used with a healthy foods and physical activity  • Surgery  can help you lose weight if you have obesity along with other health problems  Several types of weight-loss surgery are available  Ask your healthcare provider for more information  Tips for safe weight loss:   • Set small, realistic goals  An example of a small goal is to walk for 20 minutes 5 days a week  Anther goal is to lose 5% of your body weight  • Ask for support  Tell friends, family members, and coworkers about your goals  Ask someone to lose weight with you  You may also want to join a weight-loss support group  • Identify foods or triggers that may cause you to overeat  Remove tempting high-calorie foods from your home and workplace  Place a bowl of fresh fruit on your kitchen counter  If stress causes you to eat, find other ways to cope with stress  A counselor or therapist may be able to help you  • Track your daily calories and activity  Write down what you eat and drink  Also write down how many minutes of physical activity you do each day  • Track your weekly weight  Weigh yourself in the morning, before you eat or drink anything but after you use the bathroom  Use the same scale, in the same place, and in similar clothing each time  Only weigh yourself 1 to 2 times each week, or as directed  You may become discouraged if you weigh yourself every day  Eating changes:   You will need to eat 500 to 1,000 fewer calories each day than you currently eat to lose 1 to 2 pounds a week  The following changes will help you cut calories:  • Eat smaller portions  Use small plates, no larger than 9 inches in diameter  Fill your plate half full of fruits and vegetables  Measure your food using measuring cups until you know what a serving size looks like  • Eat 3 meals and 1 or 2 snacks each day  Plan your meals in advance  [place] and eat at home most of the time  Eat slowly  Do not skip meals  Skipping meals can lead to overeating later in the day  This can make it harder for you to lose weight  Talk with a dietitian to help you make a meal plan and schedule that is right for you  • Eat fruits and vegetables at every meal   They are low in calories and high in fiber, which makes you feel full  Do not add butter, margarine, or cream sauce to vegetables  Use herbs to season steamed vegetables  • Eat less fat and fewer fried foods  Eat more baked or grilled chicken and fish  These protein sources are lower in calories and fat than red meat  Limit fast food  Dress your salads with olive oil and vinegar instead of bottled dressing  • Limit the amount of sugar you eat  Do not drink sugary beverages  Limit alcohol  Activity changes:  Physical activity is good for your body in many ways  It helps you burn calories and build strong muscles  It decreases stress and depression, and improves your mood  It can also help you sleep better  Talk to your healthcare provider before you begin an exercise program   • Exercise for at least 30 minutes 5 days a week  Start slowly  Set aside time each day for physical activity that you enjoy and that is convenient for you  It is best to do both weight training and an activity that increases your heart rate, such as walking, bicycling, or swimming  • Find ways to be more active  Do yard work and housecleaning  Walk up the stairs instead of using elevators   Spend your leisure time going to events that require walking, such as outdoor festivals or fairs  This extra physical activity can help you lose weight and keep it off  Follow up with your doctor as directed: You may need to meet with a dietitian  Write down your questions so you remember to ask them during your visits  © [employer] 2022 Information is for End User's use only and may not be sold, redistributed or otherwise used for commercial purposes  The above information is an  only  It is not intended as medical advice for individual conditions or treatments  Talk to your doctor, nurse or pharmacist before following any medical regimen to see if it is safe and effective for you  Cholesterol and Your Health   AMBULATORY CARE:   Cholesterol  is a waxy, fat-like substance  Your body uses cholesterol to make hormones and new cells, and to protect nerves  Cholesterol is made by your body  It also comes from certain foods you eat, such as meat and dairy products  Your healthcare provider can help you set goals for your cholesterol levels  He or she can help you create a plan to meet your goals  Cholesterol level goals: Your cholesterol level goals depend on your risk for heart disease, your age, and your other health conditions  The following are general guidelines:  • Total cholesterol  includes low-density lipoprotein (LDL), high-density lipoprotein (HDL), and triglyceride levels  The total cholesterol level should be lower than 200 mg/dL and is best at about 150 mg/dL  • LDL cholesterol  is called bad cholesterol  because it forms plaque in your arteries  As plaque builds up, your arteries become narrow, and less blood flows through  When plaque decreases blood flow to your heart, you may have chest pain  If plaque completely blocks an artery that brings blood to your heart, you may have a heart attack  Plaque can break off and form blood clots  Blood clots may block arteries in your brain and cause a stroke   The level should be less than 130 mg/dL and is best at about 100 mg/dL  • HDL cholesterol  is called good cholesterol  because it helps remove LDL cholesterol from your arteries  It does this by attaching to LDL cholesterol and carrying it to your liver  Your liver breaks down LDL cholesterol so your body can get rid of it  High levels of HDL cholesterol can help prevent a heart attack and stroke  Low levels of HDL cholesterol can increase your risk for heart disease, heart attack, and stroke  The level should be 60 mg/dL or higher  • Triglycerides  are a type of fat that store energy from foods you eat  High levels of triglycerides also cause plaque buildup  This can increase your risk for a heart attack or stroke  If your triglyceride level is high, your LDL cholesterol level may also be high  The level should be less than 150 mg/dL  Any of the following can increase your risk for high cholesterol:   • Smoking cigarettes    • Being overweight or obese, or not getting enough exercise    • Drinking large amounts of alcohol    • A medical condition such as hypertension (high blood pressure) or diabetes    • Certain genes passed from your parents to you    • Age older than 65 years    What you need to know about having your cholesterol levels checked: Adults 21to 39years of age should have their cholesterol levels checked every 4 to 6 years  Adults 45 years or older should have their cholesterol checked every 1 to 2 years  You may need your cholesterol checked more often, or at a younger age, if you have risk factors for heart disease  You may also need to have your cholesterol checked more often if you have other health conditions, such as diabetes  Blood tests are used to check cholesterol levels  Blood tests measure your levels of triglycerides, LDL cholesterol, and HDL cholesterol    How healthy fats affect your cholesterol levels:  Healthy fats, also called unsaturated fats, help lower LDL cholesterol and triglyceride levels  Healthy fats include the following:  • Monounsaturated fats  are found in foods such as olive oil, canola oil, avocado, nuts, and olives  • Polyunsaturated fats,  such as omega 3 fats, are found in fish, such as salmon, trout, and tuna  They can also be found in plant foods such as flaxseed, walnuts, and soybeans  How unhealthy fats affect your cholesterol levels:  Unhealthy fats increase LDL cholesterol and triglyceride levels  They are found in foods high in cholesterol, saturated fat, and trans fat:  • Cholesterol  is found in eggs, dairy, and meat  • Saturated fat  is found in butter, cheese, ice cream, whole milk, and coconut oil  Saturated fat is also found in meat, such as sausage, hot dogs, and bologna  • Trans fat  is found in liquid oils and is used in fried and baked foods  Foods that contain trans fats include chips, crackers, muffins, sweet rolls, microwave popcorn, and cookies  Treatment  for high cholesterol will also decrease your risk of heart disease, heart attack, and stroke  Treatment may include any of the following:  • Lifestyle changes  may include food, exercise, weight loss, and quitting smoking  You may also need to decrease the amount of alcohol you drink  Your healthcare provider will want you to start with lifestyle changes  Other treatment may be added if lifestyle changes are not enough  Your healthcare provider may recommend you work with a team to manage hyperlipidemia  The team may include medical experts such as a dietitian, an exercise or physical therapist, and a behavior therapist  Your family members may be included in helping you create lifestyle changes  • Medicines  may be given to lower your LDL cholesterol, triglyceride levels, or total cholesterol level  You may need medicines to lower your cholesterol if any of the following is true:    ? You have a history of stroke, TIA, unstable angina, or a heart attack  ?  Your LDL cholesterol level is 190 mg/dL or higher  ? You are age 36 to 76 years, have diabetes or heart disease risk factors, and your LDL cholesterol is 70 mg/dL or higher  • Supplements  include fish oil, red yeast rice, and garlic  Fish oil may help lower your triglyceride and LDL cholesterol levels  It may also increase your HDL cholesterol level  Red yeast rice may help decrease your total cholesterol level and LDL cholesterol level  Garlic may help lower your total cholesterol level  Do not take any supplements without talking to your healthcare provider  Food changes you can make to lower your cholesterol levels:  A dietitian can help you create a healthy eating plan  He or she can show you how to read food labels and choose foods low in saturated fat, trans fats, and cholesterol  • Decrease the total amount of fat you eat  Choose lean meats, fat-free or 1% fat milk, and low-fat dairy products, such as yogurt and cheese  Try to limit or avoid red meats  Limit or do not eat fried foods or baked goods, such as cookies  • Replace unhealthy fats with healthy fats  Cook foods in olive oil or canola oil  Choose soft margarines that are low in saturated fat and trans fat  Seeds, nuts, and avocados are other examples of healthy fats  • Eat foods with omega-3 fats  Examples include salmon, tuna, mackerel, walnuts, and flaxseed  Eat fish 2 times per week  Pregnant women should not eat fish that have high levels of mercury, such as shark, swordfish, and tyrese mackerel  • Increase the amount of high-fiber foods you eat  High-fiber foods can help lower your LDL cholesterol  Aim to get between 20 and 30 grams of fiber each day  Fruits and vegetables are high in fiber  Eat at least 5 servings each day  Other high-fiber foods are whole-grain or whole-wheat breads, pastas, or cereals, and brown rice  Eat 3 ounces of whole-grain foods each day  Increase fiber slowly   You may have abdominal discomfort, bloating, and gas if you add fiber to your diet too quickly  • Eat healthy protein foods  Examples include low-fat dairy products, skinless chicken and turkey, fish, and nuts  • Limit foods and drinks that are high in sugar  Your dietitian or healthcare provider can help you create daily limits for high-sugar foods and drinks  The limit may be lower if you have diabetes or another health condition  Limits can also help you lose weight if needed  Lifestyle changes you can make to lower your cholesterol levels:   • Maintain a healthy weight  Ask your healthcare provider what a healthy weight is for you  Ask him or her to help you create a weight loss plan if needed  Weight loss can decrease your total cholesterol and triglyceride levels  Weight loss may also help keep your blood pressure at a healthy level  • Be physically active throughout the day  Physical activity, such as exercise, can help lower your total cholesterol level and maintain a healthy weight  Physical activity can also help increase your HDL cholesterol level  Work with your healthcare provider to create an program that is right for you  Get at least 30 to 40 minutes of moderate physical activity most days of the week  Examples of exercise include brisk walking, swimming, or biking  Also include strength training at least 2 times each week  Your healthcare providers can help you create a physical activity plan  • Do not smoke  Nicotine and other chemicals in cigarettes and cigars can raise your cholesterol levels  Ask your healthcare provider for information if you currently smoke and need help to quit  E-cigarettes or smokeless tobacco still contain nicotine  Talk to your healthcare provider before you use these products  • Limit or do not drink alcohol  Alcohol can increase your triglyceride levels  Ask your healthcare provider before you drink alcohol   Ask how much is okay for you to drink in 24 hours or 1 week     Follow up with your doctor as directed:  Write down your questions so you remember to ask them during your visits  © Copyright [employer] 2022 Information is for End User's use only and may not be sold, redistributed or otherwise used for commercial purposes  The above information is an  only  It is not intended as medical advice for individual conditions or treatments  Talk to your doctor, nurse or pharmacist before following any medical regimen to see if it is safe and effective for you

## 2023-03-28 NOTE — ASSESSMENT & PLAN NOTE
FLP annually - BW order given, con't current statin and fibrate for now, diet/exercise/wgt loss encouraged, will follow

## 2023-03-28 NOTE — PROGRESS NOTES
ADULT ANNUAL PHYSICAL  [address] [facility] PRIMARY CARE [address]     NAME: Ary Billingsley  AGE: 63 y o   SEX: female  : 1960     DATE: 3/28/2023     Assessment and Plan:     Problem List Items Addressed This Visit        Endocrine    Controlled type 2 diabetes mellitus without complication, without long-term current use of insulin ([facility] [address] )       Lab Results   Component Value Date    HGBA1C 5 8 (H) 2023   DM type 2 with microalbuminuria - controlled with A1C 5 8 - con't current Ozempic for now, discussed how wgt is not down significantly and could change back to Metformin or go up on Ozempic dose - pt deferring and wishing to con't current regimen, recheck BW in 6 mos - BW order given, foot exam done today, had DM eye exam 3/23, on as statin and an ACE, will follow         Relevant Orders    CBC and differential    Comprehensive metabolic panel    Hemoglobin A1C    Lipid panel    Microalbumin / creatinine urine ratio    TSH, 3rd generation with Free T4 reflex    Other specified hypothyroidism     Clinically euthyroid, TFT's to be done with next labs - BW order given, con't current levothyroxine for now         Relevant Orders    TSH, 3rd generation with Free T4 reflex    Microalbuminuria due to type 2 diabetes mellitus ([facility]     Urine microalbumin:Cr ratio annually - order given to be done with next labs, on an ACE, will follow  Lab Results   Component Value Date    HGBA1C 5 8 (H) 2023            Relevant Orders    CBC and differential    Comprehensive metabolic panel    Hemoglobin A1C    Lipid panel    Microalbumin / creatinine urine ratio    TSH, 3rd generation with Free T4 reflex       Cardiovascular and Mediastinum    Essential hypertension     BP at goal, con't current meds, diet/exercise/wgt loss encouraged         Relevant Orders    CBC and differential    Comprehensive metabolic panel    Hemoglobin A1C    Lipid panel    Microalbumin / creatinine urine ratio    TSH, 3rd generation with Free T4 reflex       Other    Dyslipidemia     FLP annually - BW order given, con't current statin and fibrate for now, diet/exercise/wgt loss encouraged, will follow         Relevant Orders    Lipid panel   Other Visit Diagnoses     Annual physical exam    -  Primary    Encounter for screening mammogram for malignant neoplasm of breast        Relevant Orders    Mammo screening bilateral w 3d & cad          Immunizations and preventive care screenings were discussed with patient today  Appropriate education was printed on patient's after visit summary  Counseling:  Alcohol/drug use: discussed moderation in alcohol intake, the recommendations for healthy alcohol use, and avoidance of illicit drug use  Dental Health: discussed importance of regular tooth brushing, flossing, and dental visits  Injury prevention: discussed safety/seat belts, safety helmets, smoke detectors, carbon dioxide detectors, and smoking near bedding or upholstery  · Exercise: the importance of regular exercise/physical activity was discussed  Recommend exercise 3-5 times per week for at least 30 minutes  · Cervical cancer screening: s/p hysterectomy d/t endometrial CA  · Breast cancer screening: Mammo 5/22 - order given for 2023  · Colon cancer screening: Colonoscopy 9/14 - 10 yrs    BMI Counseling: Body mass index is 38 39 kg/m²  The BMI is above normal  Nutrition recommendations include decreasing portion sizes, encouraging healthy choices of fruits and vegetables, consuming healthier snacks, moderation in carbohydrate intake, increasing intake of lean protein, reducing intake of saturated and trans fat and reducing intake of cholesterol  Exercise recommendations include exercising 3-5 times per week  No pharmacotherapy was ordered  Rationale for BMI follow-up plan is due to patient being overweight or obese  Tobacco Cessation Counseling: Tobacco cessation counseling was provided   The patient is sincerely urged to quit consumption of tobacco  She is not ready to quit tobacco      DM labs 3/23 (5 8)  DM foot exam 2/22  DM eye exam 3/23    Shingrix series started today      Return in about 6 months (around 9/29/2023) for Recheck  Chief Complaint:     Chief Complaint   Patient presents with   • Follow-up      History of Present Illness:     Adult Annual Physical   Patient here for a comprehensive physical exam  The patient reports no problems  BW results were d/w pt in detail: FBS/A1C 98/5 8, rest of BMP was wnl  Def of controlled vs uncontrolled DM was reviewed  Diet was reviewed - wgt up 10 lbs from Sept 22  She is taking her DM meds as directed - pt requested a rx for Ozempic for DM and wgt loss - rx was started and Metformin was stopped d/t the med change  She does check her BS daily and is around  110 fasting - is going down to the 90's but not below 90's  She is UTD on DM eye exam (3/23) but is overdue for her DM foot exam   She notes notes no numbness/tingling/sores/ulcers  She is on statin and ACE  BP at goal today and meds were reviewed and no changes have occurred  She denies missing doses of meds or SE with the meds  She does not check her BP outside the office  She notes no frequent Ha's/dizziness/double vision/CP  Importance of BP and BS control for her h/o microalbuminuria was reviewed  Last urine microalbumin:Cr ratio was 29 in July of 22  Diet and Physical Activity  · Diet/Nutrition: diabetic diet and consuming 3-5 servings of fruits/vegetables daily  · Exercise: no formal exercise  · BMI reviewed - wgt up 10 lbs from Sept 22     Depression Screening  PHQ-2/9 Depression Screening         General Health  · Sleep: sleeps poorly  Has issues staying asleep and con't to wake up based on her old work schedule  · Hearing: normal - bilateral   · Vision: no vision problems, goes for regular eye exams and most recent eye exam <1 year ago  Wears glasses    · Dental: regular dental visits and brushes teeth twice daily  /GYN Health  · Patient is: postmenopausal  · Last menstrual period: postmenopausal  · Contraceptive method: postmenopausal   · PAP s/p hysterectomy  · Mammo 25     Review of Systems:     Review of Systems   Constitutional: Negative for chills and fever  HENT: Negative for congestion, hearing loss and trouble swallowing  Eyes: Negative for pain and visual disturbance  Respiratory: Negative for cough, shortness of breath and wheezing  Cardiovascular: Negative for chest pain and palpitations  Gastrointestinal: Positive for constipation and diarrhea  Negative for abdominal pain, blood in stool, nausea and vomiting  Genitourinary: Negative for difficulty urinating, dysuria, vaginal bleeding and vaginal pain  Musculoskeletal: Negative for back pain and neck pain  Skin: Negative for rash and wound  Neurological: Negative for dizziness, light-headedness and headaches  Hematological: Does not bruise/bleed easily  Psychiatric/Behavioral: Positive for sleep disturbance  Negative for dysphoric mood        Past Medical History:     Past Medical History:   Diagnosis Date   • Anxiety    • Endometrial cancer ([facility] [address] )    • PMB (postmenopausal bleeding)     for hysterectomy today 2020-cancer dx   • Psoriasis    • Tobacco abuse    • Wears glasses       Past Surgical History:     Past Surgical History:   Procedure Laterality Date   • CARPAL TUNNEL RELEASE Bilateral    •  SECTION, LOW TRANSVERSE     • COLONOSCOPY      colo 16 - polyp at sigmoid colon - 6 yr if adenoma or 10 yr if hyperplastic pathology: early hyperplastic changes    • CYSTOSCOPY N/A 2020    Procedure: CYSTOSCOPY;  Surgeon: Marlee Blizzard, MD;  Location: [facility] OR;  Service: Gynecology Oncology   • GALLBLADDER SURGERY     • HYSTERECTOMY     • LAPAROSCOPY FOR ECTOPIC PREGNANCY      With excision   • OOPHORECTOMY Bilateral    • [place] INJECTION PROCEDURE LYMPHANGIOGRAPHY Bilateral 05/28/2020    Procedure: LYMPHOGRAPHY WITH INDOCYANINE GREEN (ICG); Surgeon: Sylvain Storm MD;  [facility];  Service: Gynecology Oncology   • [place] LAPS TOTAL HYSTERECT 250 GM/< W/RMVL TUBE/OVARY N/A 05/28/2020    Procedure: ROBOTIC TOTAL HYSTERECTOMY, BSO, PELVIC NODE DISSECTION;  Surgeon: Sylvain Storm MD;  Location[facility];  Service: Gynecology Oncology   • [place] [address] WRST SURG W/RLS TRANSVRS CARPL LIGM Right 06/25/2019    Procedure: RELEASE CARPAL TUNNEL ENDOSCOPIC;  Surgeon: Pau Vazquez MD;  Location: [facility];  Service: Orthopedics   • [place] [address] WRST SURG W/RLS TRANSVRS CARPL LIGM Left 08/08/2019    Procedure: RELEASE CARPAL TUNNEL ENDOSCOPIC;  Surgeon: Pau Vazquez MD;  Location: [facility] OR;  Service: Orthopedics   • THYROID SURGERY     • WRIST SURGERY        Social History:     Social History     Socioeconomic History   • Marital status:      Spouse name: None   • Number of children: 1   • Years of education: None   • Highest education level: None   Occupational History   • None   Tobacco Use   • Smoking status: Every Day     Packs/day: 0 50     Years: 30 00     Pack years: 15 00     Types: Cigarettes   • Smokeless tobacco: Never   Vaping Use   • Vaping Use: Never used   Substance and Sexual Activity   • Alcohol use: Yes     Alcohol/week: 2 0 standard drinks     Types: 2 Cans of beer per week     Comment: beer   • Drug use: No   • Sexual activity: Yes     Partners: Male     Birth control/protection: None   Other Topics Concern   • None   Social History Narrative    Caffeine Use      Occupation:    Description: worked for [employer] for 19 years  Pnor back injury 1999, treated With LESI and RPv'V       [address]     Uses Safety Equipment - Seatbelts      Social Determinants of Health     Financial Resource Strain: Not on file   Food Insecurity: Not on file   Transportation Needs: Not on file   Physical Activity: Not on file   Stress: Not on file   Social Connections: Not on file   Intimate Partner Violence: Not on file   Housing Stability: Not on file      Family History:     Family History   Problem Relation Age of Onset   • Asthma Mother    • Diabetes Father    • Hypertension Father    • No Known Problems Sister    • No Known Problems Sister    • No Known Problems Maternal Grandmother    • No Known Problems Maternal Grandfather    • No Known Problems Paternal Grandmother    • No Known Problems Paternal Grandfather    • No Known Problems Maternal Aunt    • No Known Problems Maternal Aunt    • No Known Problems Maternal Aunt    • No Known Problems Maternal Aunt    • No Known Problems Maternal Aunt    • No Known Problems Paternal Aunt    • No Known Problems Paternal Aunt    • Hypertension Family    • Neuropathy Family    • Stroke Family    • Thyroid disease Family       Current Medications:     Current Outpatient Medications   Medication Sig Dispense Refill   • albuterol (PROVENTIL HFA,VENTOLIN HFA) 90 mcg/act inhaler Inhale 2 puffs every 4 (four) hours as needed for wheezing or shortness of breath 18 g 0   • cholecalciferol (VITAMIN D3) 1,000 units tablet Take 1 tablet by mouth daily     • fenofibrate micronized (LOFIBRA) 200 MG capsule Take 1 capsule by mouth once daily 90 capsule 0   • levothyroxine 200 mcg tablet TAKE 1 TABLET BY MOUTH ONCE DAILY EXCEPT  FOR  1 5  TABLETS  ONCE  WEEKLY  AS  DIRECTED 90 tablet 0   • lisinopril (ZESTRIL) 30 mg tablet Take 1 tablet by mouth once daily 90 tablet 0   • metFORMIN (GLUCOPHAGE-XR) 500 mg 24 hr tablet TAKE 1 TABLET BY MOUTH IN THE MORNING 90 tablet 0   • pravastatin (PRAVACHOL) 20 mg tablet Take 1 tablet by mouth once daily 90 tablet 0   • Semaglutide,0 25 or 0 5MG/DOS, 2 MG/1 5ML SOPN 0 25 mcg SQ q wk x 4 wk then increase to 0 5 mg SQ q wk 1 5 mL 2   • Taltz 80 MG/ML SOAJ      • triamcinolone (KENALOG) 0 1 % cream APPLY TO AFFECTED AREA(S) TWICE DAILY AS NEEDED "    No current facility-administered medications for this visit  Allergies: Allergies   Allergen Reactions   • Methimazole Arthralgia     Reaction Date: [id number]; Annotation - [id number]: Joint pain      Physical Exam:     /86   Pulse 95   Temp 98 7 °F (37 1 °C) (Tympanic)   Ht 5' 1\" (1 549 m)   Wt 92 2 kg (203 lb 3 2 oz)   LMP 04/20/2020   BMI 38 39 kg/m²     Physical Exam  Vitals and nursing note reviewed  Constitutional:       General: She is not in acute distress  Appearance: She is well-developed  She is obese  She is not ill-appearing  HENT:      Head: Normocephalic and atraumatic  Right Ear: Tympanic membrane and external ear normal       Left Ear: Tympanic membrane and external ear normal    Eyes:      General:         Right eye: No discharge  Left eye: No discharge  Conjunctiva/sclera: Conjunctivae normal    Neck:      Thyroid: No thyromegaly  Trachea: No tracheal deviation  Cardiovascular:      Rate and Rhythm: Normal rate and regular rhythm  Pulses: no weak pulses          Dorsalis pedis pulses are 2+ on the right side and 2+ on the left side  Heart sounds: Normal heart sounds  No murmur heard  Pulmonary:      Effort: Pulmonary effort is normal  No respiratory distress  Breath sounds: Normal breath sounds  No wheezing, rhonchi or rales  Abdominal:      General: There is no distension  Palpations: Abdomen is soft  Tenderness: There is no abdominal tenderness  There is no guarding or rebound  Musculoskeletal:         General: No deformity or signs of injury  Cervical back: Neck supple  Feet:      Right foot:      Skin integrity: Callus present  No ulcer, skin breakdown, erythema, warmth or dry skin  Left foot:      Skin integrity: Callus present  No ulcer, skin breakdown, erythema, warmth or dry skin  Lymphadenopathy:      Cervical: No cervical adenopathy  Skin:     General: Skin is warm and dry        " Coloration: Skin is not pale  Findings: No rash  Neurological:      General: No focal deficit present  Mental Status: She is alert  Motor: No abnormal muscle tone  Gait: Gait normal    Psychiatric:         Mood and Affect: Mood normal          Behavior: Behavior normal          Thought Content: Thought content normal          Judgment: Judgment normal        Diabetic Foot Exam    Patient's shoes and socks removed  Right Foot/Ankle   Right Foot Inspection  Skin Exam: skin normal, skin intact, callus and callus  No dry skin, no warmth, no erythema, no maceration, no abnormal color, no pre-ulcer and no ulcer  Toe Exam: ROM and strength within normal limits  Sensory   Vibration: intact  Monofilament testing: intact    Vascular  The right DP pulse is 2+  Left Foot/Ankle  Left Foot Inspection  Skin Exam: skin normal, skin intact and callus  No dry skin, no warmth, no erythema, no maceration, normal color, no pre-ulcer and no ulcer  Toe Exam: ROM and strength within normal limits  Sensory   Vibration: intact  Monofilament testing: intact    Vascular  The left DP pulse is 2+       Assign Risk Category  No deformity present  No loss of protective sensation  No weak pulses  Risk: 0      Lucy Ortiz, DO  [address] [place]

## 2023-03-28 NOTE — ASSESSMENT & PLAN NOTE
Lab Results   Component Value Date    HGBA1C 5 8 (H) 03/22/2023   DM type 2 with microalbuminuria - controlled with A1C 5 8 - con't current Ozempic for now, discussed how wgt is not down significantly and could change back to Metformin or go up on Ozempic dose - pt deferring and wishing to con't current regimen, recheck BW in 6 mos - BW order given, foot exam done today, had DM eye exam 3/23, on as statin and an ACE, will follow

## 2023-03-28 NOTE — ASSESSMENT & PLAN NOTE
Urine microalbumin:Cr ratio annually - order given to be done with next labs, on an ACE, will follow  Lab Results   Component Value Date    HGBA1C 5 8 (H) 03/22/2023

## 2023-03-28 NOTE — ASSESSMENT & PLAN NOTE
Clinically euthyroid, TFT's to be done with next labs - BW order given, con't current levothyroxine for now

## 2023-07-05 ENCOUNTER — HOSPITAL ENCOUNTER (OUTPATIENT)
Dept: MAMMOGRAPHY | Facility: IMAGING CENTER | Age: 63
Discharge: HOME | End: 2023-07-05
Payer: COMMERCIAL

## 2023-07-05 VITALS — BODY MASS INDEX: 38.33 KG/M2 | WEIGHT: 203 LBS | HEIGHT: 61 IN

## 2023-07-05 DIAGNOSIS — Z12.31 ENCOUNTER FOR SCREENING MAMMOGRAM FOR MALIGNANT NEOPLASM OF BREAST: ICD-10-CM

## 2023-07-05 PROCEDURE — 77067 SCR MAMMO BI INCL CAD: CPT

## 2023-07-05 PROCEDURE — 77063 BREAST TOMOSYNTHESIS BI: CPT

## 2023-07-12 DIAGNOSIS — I10 ESSENTIAL HYPERTENSION: ICD-10-CM

## 2023-07-12 DIAGNOSIS — E78.1 ESSENTIAL HYPERTRIGLYCERIDEMIA: ICD-10-CM

## 2023-07-12 DIAGNOSIS — E03.8 OTHER SPECIFIED HYPOTHYROIDISM: ICD-10-CM

## 2023-07-12 DIAGNOSIS — E11.9 TYPE 2 DIABETES MELLITUS WITHOUT COMPLICATION, WITHOUT LONG-TERM CURRENT USE OF INSULIN (HCC): ICD-10-CM

## 2023-07-12 RX ORDER — FENOFIBRATE 200 MG/1
200 CAPSULE ORAL DAILY
Qty: 90 CAPSULE | Refills: 0 | Status: SHIPPED | OUTPATIENT
Start: 2023-07-12

## 2023-07-12 RX ORDER — LISINOPRIL 30 MG/1
30 TABLET ORAL DAILY
Qty: 90 TABLET | Refills: 0 | Status: SHIPPED | OUTPATIENT
Start: 2023-07-12

## 2023-07-12 RX ORDER — LEVOTHYROXINE SODIUM 0.2 MG/1
TABLET ORAL
Qty: 90 TABLET | Refills: 0 | Status: SHIPPED | OUTPATIENT
Start: 2023-07-12

## 2023-07-13 DIAGNOSIS — E11.9 CONTROLLED TYPE 2 DIABETES MELLITUS WITHOUT COMPLICATION, WITHOUT LONG-TERM CURRENT USE OF INSULIN (HCC): ICD-10-CM

## 2023-07-13 DIAGNOSIS — E78.5 DYSLIPIDEMIA: ICD-10-CM

## 2023-07-13 RX ORDER — PRAVASTATIN SODIUM 20 MG
TABLET ORAL
Qty: 90 TABLET | Refills: 0 | Status: SHIPPED | OUTPATIENT
Start: 2023-07-13

## 2023-07-13 RX ORDER — SEMAGLUTIDE 0.68 MG/ML
INJECTION, SOLUTION SUBCUTANEOUS
Qty: 3 ML | Refills: 0 | OUTPATIENT
Start: 2023-07-13

## 2023-07-13 RX ORDER — SEMAGLUTIDE 1.34 MG/ML
INJECTION, SOLUTION SUBCUTANEOUS
Qty: 2 ML | Refills: 0 | Status: SHIPPED | OUTPATIENT
Start: 2023-07-13 | End: 2023-07-13 | Stop reason: SDUPTHER

## 2023-07-14 DIAGNOSIS — E11.9 CONTROLLED TYPE 2 DIABETES MELLITUS WITHOUT COMPLICATION, WITHOUT LONG-TERM CURRENT USE OF INSULIN (HCC): Primary | ICD-10-CM

## 2023-07-14 RX ORDER — SEMAGLUTIDE 0.68 MG/ML
INJECTION, SOLUTION SUBCUTANEOUS
Qty: 3 ML | Refills: 1 | OUTPATIENT
Start: 2023-07-14

## 2023-07-18 DIAGNOSIS — E11.9 CONTROLLED TYPE 2 DIABETES MELLITUS WITHOUT COMPLICATION, WITHOUT LONG-TERM CURRENT USE OF INSULIN (HCC): ICD-10-CM

## 2023-08-01 ENCOUNTER — APPOINTMENT (OUTPATIENT)
Dept: RADIOLOGY | Facility: CLINIC | Age: 63
End: 2023-08-01
Payer: COMMERCIAL

## 2023-08-01 ENCOUNTER — OFFICE VISIT (OUTPATIENT)
Dept: URGENT CARE | Facility: CLINIC | Age: 63
End: 2023-08-01
Payer: COMMERCIAL

## 2023-08-01 VITALS
TEMPERATURE: 98.1 F | OXYGEN SATURATION: 96 % | RESPIRATION RATE: 24 BRPM | BODY MASS INDEX: 39.66 KG/M2 | HEART RATE: 91 BPM | SYSTOLIC BLOOD PRESSURE: 118 MMHG | DIASTOLIC BLOOD PRESSURE: 68 MMHG | HEIGHT: 60 IN | WEIGHT: 202 LBS

## 2023-08-01 DIAGNOSIS — R07.89 OTHER CHEST PAIN: ICD-10-CM

## 2023-08-01 DIAGNOSIS — R06.02 SHORTNESS OF BREATH: Primary | ICD-10-CM

## 2023-08-01 DIAGNOSIS — R06.02 SOB (SHORTNESS OF BREATH): ICD-10-CM

## 2023-08-01 DIAGNOSIS — R06.02 SHORTNESS OF BREATH: ICD-10-CM

## 2023-08-01 PROCEDURE — 71046 X-RAY EXAM CHEST 2 VIEWS: CPT

## 2023-08-01 PROCEDURE — 99213 OFFICE O/P EST LOW 20 MIN: CPT | Performed by: FAMILY MEDICINE

## 2023-08-01 NOTE — PROGRESS NOTES
[facility]        NAME: Ni Snyder is a 63 y.o. female  : 1960    MRN: [id number]  DATE: 2023  TIME: 7:51 PM    Assessment and Plan   Shortness of breath [R06.02]  1. Shortness of breath  XR chest pa & lateral      2. SOB (shortness of breath)        3. Other chest pain              Patient Instructions   Patient with Cp/SOB, recommend referral to ED for further evaluation management. Follow up with PCP in 3-5 days. Proceed to  ER if symptoms worsen. Chief Complaint     Chief Complaint   Patient presents with   • Cough     PT presents with cough and shortness of breath x 1 week. Pt has hx of asthma and bronchitis. History of Present Illness       82-year-old female with 1 week history of chest tightness and shortness of breath. She states that she is having difficulty with breathing and catching her breath. She has been using a nebulizer at home in conjunction with an inhaler and notes no relief. Denies any fevers or chills. Review of Systems   Review of Systems   Constitutional: Negative. HENT: Negative. Eyes: Negative. Respiratory: Positive for chest tightness and shortness of breath. Cardiovascular: Negative. Gastrointestinal: Negative. Endocrine: Negative. Genitourinary: Negative. Musculoskeletal: Negative. Skin: Negative. Allergic/Immunologic: Negative. Neurological: Negative. Hematological: Negative. Psychiatric/Behavioral: Negative.           Current Medications       Current Outpatient Medications:   •  albuterol (PROVENTIL HFA,VENTOLIN HFA) 90 mcg/act inhaler, Inhale 2 puffs every 4 (four) hours as needed for wheezing or shortness of breath, Disp: 18 g, Rfl: 0  •  cholecalciferol (VITAMIN D3) 1,000 units tablet, Take 1 tablet by mouth daily, Disp: , Rfl:   •  fenofibrate micronized (LOFIBRA) 200 MG capsule, Take 1 capsule (200 mg total) by mouth daily, Disp: 90 capsule, Rfl: 0  •  levothyroxine 200 mcg tablet, 1 tab PO q day except one day a week 1.5 tab, Disp: 90 tablet, Rfl: 0  •  lisinopril (ZESTRIL) 30 mg tablet, Take 1 tablet (30 mg total) by mouth daily, Disp: 90 tablet, Rfl: 0  •  pravastatin (PRAVACHOL) 20 mg tablet, Take 1 tablet by mouth once daily, Disp: 90 tablet, Rfl: 0  •  semaglutide, 0.25 or 0.5 mg/dose, (Ozempic, 0.25 or 0.5 MG/DOSE,) 2 mg/3 mL injection pen, Inject 0.75 mL (0.5 mg total) under the skin every 7 days, Disp: 9 mL, Rfl: 1  •  Taltz 80 MG/ML SOAJ, , Disp: , Rfl:   •  triamcinolone (KENALOG) 0.1 % cream, APPLY TO AFFECTED AREA(S) TWICE DAILY AS NEEDED, Disp: , Rfl:   •  metFORMIN (GLUCOPHAGE-XR) 500 mg 24 hr tablet, TAKE 1 TABLET BY MOUTH IN THE MORNING (Patient not taking: Reported on 2023), Disp: 90 tablet, Rfl: 0    Current Allergies     Allergies as of 2023 - Reviewed 2023   Allergen Reaction Noted   • Methimazole Arthralgia 2012            The following portions of the patient's history were reviewed and updated as appropriate: allergies, current medications, past family history, past medical history, past social history, past surgical history and problem list.     Past Medical History:   Diagnosis Date   • Anxiety    • Endometrial cancer ([address])    • PMB (postmenopausal bleeding)     for hysterectomy today 2020-cancer dx   • Psoriasis    • Tobacco abuse    • Wears glasses        Past Surgical History:   Procedure Laterality Date   • CARPAL TUNNEL RELEASE Bilateral    •  SECTION, LOW TRANSVERSE     • COLONOSCOPY      colo 16 - polyp at sigmoid colon - 6 yr if adenoma or 10 yr if hyperplastic pathology: early hyperplastic changes    • CYSTOSCOPY N/A 2020    Procedure: CYSTOSCOPY;  Surgeon: Sheree Gagnon MD;  [facility];  Service: Gynecology Oncology   • GALLBLADDER SURGERY     • HYSTERECTOMY     • LAPAROSCOPY FOR ECTOPIC PREGNANCY      With excision   • OOPHORECTOMY Bilateral    • [place] INJECTION PROCEDURE LYMPHANGIOGRAPHY Bilateral 05/28/2020    Procedure: LYMPHOGRAPHY WITH INDOCYANINE GREEN (ICG); Surgeon: Sheree Gagnon MD;  [facility];  Service: Gynecology Oncology   • [place] LAPS TOTAL HYSTERECT 250 GM/< W/RMVL TUBE/OVARY N/A 05/28/2020    Procedure: ROBOTIC TOTAL HYSTERECTOMY, BSO, PELVIC NODE DISSECTION;  Surgeon: Sheree Gagnon MD;  [facility];  Service: Gynecology Oncology   • [place] [address] WRST SURG W/RLS TRANSVRS CARPL LIGM Right 06/25/2019    Procedure: RELEASE CARPAL TUNNEL ENDOSCOPIC;  Surgeon: Latoya Paez MD;  [facility];  Service: Orthopedics   • [place] [address] WRST SURG W/RLS TRANSVRS CARPL LIGM Left 08/08/2019    Procedure: RELEASE CARPAL TUNNEL ENDOSCOPIC;  Surgeon: Latoya Paez MD;  [facility]: [facility];  Service: Orthopedics   • THYROID SURGERY     • WRIST SURGERY         Family History   Problem Relation Age of Onset   • Asthma Mother    • Diabetes Father    • Hypertension Father    • Lung cancer Father         age unknown   • No Known Problems Sister    • No Known Problems Sister    • No Known Problems Maternal Grandmother    • No Known Problems Maternal Grandfather    • No Known Problems Paternal Grandmother    • No Known Problems Paternal Grandfather    • No Known Problems Maternal Aunt    • No Known Problems Maternal Aunt    • No Known Problems Maternal Aunt    • No Known Problems Maternal Aunt    • No Known Problems Maternal Aunt    • No Known Problems Paternal Aunt    • No Known Problems Paternal Aunt    • Hypertension Family    • Neuropathy Family    • Stroke Family    • Thyroid disease Family          Medications have been verified. Objective   /68   Pulse 91   Temp 98.1 °F (36.7 °C)   Resp (!) 24   Ht 5' (1.524 m)   Wt 91.6 kg (202 lb)   LMP 04/10/2020   SpO2 96%   BMI 39.45 kg/m²   Patient's last menstrual period was 04/10/2020. Physical Exam     Physical Exam  Vitals and nursing note reviewed.    Constitutional:       Appearance: She is well-developed. HENT:      Head: Normocephalic. Nose: Nose normal.   Eyes:      Pupils: Pupils are equal, round, and reactive to light. Cardiovascular:      Rate and Rhythm: Normal rate and regular rhythm. Pulmonary:      Effort: Pulmonary effort is normal.      Breath sounds: Wheezing and rhonchi present. Musculoskeletal:         General: Normal range of motion. Skin:     General: Skin is warm and dry. Neurological:      Mental Status: She is alert and oriented to person, place, and time.

## 2023-08-08 DIAGNOSIS — J45.40 MODERATE PERSISTENT ASTHMA WITHOUT COMPLICATION: ICD-10-CM

## 2023-08-08 RX ORDER — ALBUTEROL SULFATE 90 UG/1
2 AEROSOL, METERED RESPIRATORY (INHALATION) EVERY 4 HOURS PRN
Qty: 18 G | Refills: 0 | Status: SHIPPED | OUTPATIENT
Start: 2023-08-08

## 2023-08-09 NOTE — ASSESSMENT & PLAN NOTE
Rescue inhaler refilled upon request, no s/sx of exacerbation and no regular use of inhaler noted - call with resp symptoms, has had both COVID vaccine's Dressing: bandage

## 2023-08-24 ENCOUNTER — TELEPHONE (OUTPATIENT)
Dept: HEMATOLOGY ONCOLOGY | Facility: CLINIC | Age: 63
End: 2023-08-24

## 2023-08-24 ENCOUNTER — OFFICE VISIT (OUTPATIENT)
Age: 63
End: 2023-08-24
Payer: COMMERCIAL

## 2023-08-24 VITALS
TEMPERATURE: 97.8 F | HEART RATE: 92 BPM | RESPIRATION RATE: 16 BRPM | BODY MASS INDEX: 37.76 KG/M2 | OXYGEN SATURATION: 98 % | DIASTOLIC BLOOD PRESSURE: 70 MMHG | SYSTOLIC BLOOD PRESSURE: 118 MMHG | HEIGHT: 61 IN | WEIGHT: 200 LBS

## 2023-08-24 DIAGNOSIS — Z08 ENCOUNTER FOR FOLLOW-UP SURVEILLANCE OF ENDOMETRIAL CANCER: ICD-10-CM

## 2023-08-24 DIAGNOSIS — Z85.42 ENCOUNTER FOR FOLLOW-UP SURVEILLANCE OF ENDOMETRIAL CANCER: ICD-10-CM

## 2023-08-24 DIAGNOSIS — Z85.42 HISTORY OF ENDOMETRIAL CANCER: Primary | ICD-10-CM

## 2023-08-24 PROCEDURE — 99212 OFFICE O/P EST SF 10 MIN: CPT | Performed by: PHYSICIAN ASSISTANT

## 2023-08-24 NOTE — TELEPHONE ENCOUNTER
Patient Running Late       Who are you speaking with? Patient   If it is not the patient, are they listed on an active communication consent form? N/A   When is the appointment scheduled? Please list date and time 8/24/23 9am   At which location is the appointment scheduled to take place? [place]   If patient is running less than 15 minutes late, have patient proceed to office. Appointment may need to be rescheduled at providers discretion. If provider cannot see patient today, the office will reschedule the visit. Was this relayed to patient?  Yes

## 2023-08-24 NOTE — PROGRESS NOTES
Assessment/Plan:    Problem List Items Addressed This Visit        Other    History of endometrial cancer - Primary     History of stage IA, grade 1 endometrial cancer s/p surgical resection in June 2020. She is clinically without evidence of disease recurrence.       Return to the office in 1 year for continued cancer surveillance. Encounter for follow-up surveillance of endometrial cancer         CHIEF COMPLAINT:   Endometrial cancer surveillance    Problem:  Cancer Staging   History of endometrial cancer  Staging form: Corpus Uteri - Carcinoma, AJCC 8th Edition  - Clinical: FIGO Stage IA - Signed by Liliam Vazquez PA-C on 6/10/2020        Previous therapy:  Oncology History   History of endometrial cancer   5/28/2020 Surgery    Robotic assisted TLH, BSO, sentinel LND and cystoscopy   A. Grade 1  B. No myometrial invasion, no LVSI  C. 2 negative lymph nodes  D. IHC, all proteins intact. 6/10/2020 -  Cancer Staged    Staging form: Corpus Uteri - Carcinoma, AJCC 8th Edition  - Clinical: FIGO Stage IA - Signed by Liliam Vazquez PA-C on 6/10/2020  Histologic grade (G): G1  Histologic grading system: 3 grade system             Patient ID: Ashok Chauhan is a 63 y.o. female  who has no new complaints today. No vaginal bleeding, abdominal/pelvic pain. Normal bowel and bladder function. No interval change in medical history since last visit. Quality of life is good. Screening mammogram from 07/05/2023 reviewed. The following portions of the patient's history were reviewed and updated as appropriate: allergies, current medications, past medical history, past surgical history and problem list.    Review of Systems   Constitutional: Negative. HENT: Negative. Eyes: Negative. Respiratory: Negative. Cardiovascular: Negative. Gastrointestinal: Negative. Genitourinary: Negative. Musculoskeletal: Negative. Skin: Negative. Neurological: Negative. Psychiatric/Behavioral: Negative. Current Outpatient Medications   Medication Sig Dispense Refill   • albuterol (PROVENTIL HFA,VENTOLIN HFA) 90 mcg/act inhaler Inhale 2 puffs every 4 (four) hours as needed for wheezing or shortness of breath 18 g 0   • cholecalciferol (VITAMIN D3) 1,000 units tablet Take 1 tablet by mouth daily     • fenofibrate micronized (LOFIBRA) 200 MG capsule Take 1 capsule (200 mg total) by mouth daily 90 capsule 0   • levothyroxine 200 mcg tablet 1 tab PO q day except one day a week 1.5 tab 90 tablet 0   • lisinopril (ZESTRIL) 30 mg tablet Take 1 tablet (30 mg total) by mouth daily 90 tablet 0   • pravastatin (PRAVACHOL) 20 mg tablet Take 1 tablet by mouth once daily 90 tablet 0   • semaglutide, 0.25 or 0.5 mg/dose, (Ozempic, 0.25 or 0.5 MG/DOSE,) 2 mg/3 mL injection pen Inject 0.75 mL (0.5 mg total) under the skin every 7 days 9 mL 1   • Taltz 80 MG/ML SOAJ      • triamcinolone (KENALOG) 0.1 % cream APPLY TO AFFECTED AREA(S) TWICE DAILY AS NEEDED     • metFORMIN (GLUCOPHAGE-XR) 500 mg 24 hr tablet TAKE 1 TABLET BY MOUTH IN THE MORNING (Patient not taking: Reported on 8/1/2023) 90 tablet 0     No current facility-administered medications for this visit. Objective:    Blood pressure 118/70, pulse 92, temperature 97.8 °F (36.6 °C), temperature source Temporal, resp. rate 16, height 5' 1" (1.549 m), weight 90.7 kg (200 lb), last menstrual period 04/10/2020, SpO2 98 %, not currently breastfeeding. Body mass index is 37.79 kg/m². Body surface area is 1.89 meters squared. Physical Exam  Vitals reviewed. Exam conducted with a chaperone present. Constitutional:       General: She is not in acute distress. Appearance: Normal appearance. She is not ill-appearing. HENT:      Head: Normocephalic and atraumatic. Mouth/Throat:      Mouth: Mucous membranes are moist.   Eyes:      General:         Right eye: No discharge. Left eye: No discharge.       Conjunctiva/sclera: Conjunctivae normal. Pulmonary:      Effort: Pulmonary effort is normal.   Abdominal:      Palpations: Abdomen is soft. There is no mass. Tenderness: There is no abdominal tenderness. Hernia: No hernia is present. Genitourinary:     Comments: The external female genitalia is normal. The bartholin's, uretheral and skenes glands are normal. The urethral meatus is normal (midline with no lesions). Anus without fissure or lesion. Speculum exam reveals a grossly normal vagina. No masses, lesions,discharge or bleeding. No significant cystocele or rectocele noted. Bimanual exam notes a surgical absent cervix, uterus and adnexal structures. No masses or fullness. Bladder is without fullness, mass or tenderness. Musculoskeletal:      Right lower leg: No edema. Left lower leg: No edema. Skin:     General: Skin is warm and dry. Coloration: Skin is not jaundiced. Findings: No rash. Neurological:      General: No focal deficit present. Mental Status: She is alert and oriented to person, place, and time. Cranial Nerves: No cranial nerve deficit. Sensory: No sensory deficit. Motor: No weakness. Gait: Gait normal.   Psychiatric:         Mood and Affect: Mood normal.         Behavior: Behavior normal.         Thought Content:  Thought content normal.         Judgment: Judgment normal.

## 2023-08-24 NOTE — ASSESSMENT & PLAN NOTE
History of stage IA, grade 1 endometrial cancer s/p surgical resection in June 2020. She is clinically without evidence of disease recurrence.       Return to the office in 1 year for continued cancer surveillance.

## 2023-09-07 ENCOUNTER — APPOINTMENT (OUTPATIENT)
Dept: LAB | Facility: HOSPITAL | Age: 63
End: 2023-09-07
Payer: COMMERCIAL

## 2023-09-07 DIAGNOSIS — E11.29 MICROALBUMINURIA DUE TO TYPE 2 DIABETES MELLITUS (HCC): ICD-10-CM

## 2023-09-07 DIAGNOSIS — E11.9 CONTROLLED TYPE 2 DIABETES MELLITUS WITHOUT COMPLICATION, WITHOUT LONG-TERM CURRENT USE OF INSULIN (HCC): ICD-10-CM

## 2023-09-07 DIAGNOSIS — R80.9 MICROALBUMINURIA DUE TO TYPE 2 DIABETES MELLITUS (HCC): ICD-10-CM

## 2023-09-07 DIAGNOSIS — E78.5 DYSLIPIDEMIA: ICD-10-CM

## 2023-09-07 DIAGNOSIS — I10 ESSENTIAL HYPERTENSION: ICD-10-CM

## 2023-09-07 DIAGNOSIS — E03.8 OTHER SPECIFIED HYPOTHYROIDISM: ICD-10-CM

## 2023-09-07 LAB
ALBUMIN SERPL BCP-MCNC: 4.3 G/DL (ref 3.5–5)
ALP SERPL-CCNC: 69 U/L (ref 34–104)
ALT SERPL W P-5'-P-CCNC: 26 U/L (ref 7–52)
ANION GAP SERPL CALCULATED.3IONS-SCNC: 6 MMOL/L
AST SERPL W P-5'-P-CCNC: 23 U/L (ref 13–39)
BASOPHILS # BLD AUTO: 0.08 THOUSANDS/ÂΜL (ref 0–0.1)
BASOPHILS NFR BLD AUTO: 1 % (ref 0–1)
BILIRUB SERPL-MCNC: 0.37 MG/DL (ref 0.2–1)
BUN SERPL-MCNC: 16 MG/DL (ref 5–25)
CALCIUM SERPL-MCNC: 9.7 MG/DL (ref 8.4–10.2)
CHLORIDE SERPL-SCNC: 106 MMOL/L (ref 96–108)
CHOLEST SERPL-MCNC: 165 MG/DL
CO2 SERPL-SCNC: 26 MMOL/L (ref 21–32)
CREAT SERPL-MCNC: 0.78 MG/DL (ref 0.6–1.3)
CREAT UR-MCNC: 140.5 MG/DL
EOSINOPHIL # BLD AUTO: 0.95 THOUSAND/ÂΜL (ref 0–0.61)
EOSINOPHIL NFR BLD AUTO: 13 % (ref 0–6)
ERYTHROCYTE [DISTWIDTH] IN BLOOD BY AUTOMATED COUNT: 13.5 % (ref 11.6–15.1)
EST. AVERAGE GLUCOSE BLD GHB EST-MCNC: 131 MG/DL
GFR SERPL CREATININE-BSD FRML MDRD: 82 ML/MIN/1.73SQ M
GLUCOSE P FAST SERPL-MCNC: 89 MG/DL (ref 65–99)
HBA1C MFR BLD: 6.2 %
HCT VFR BLD AUTO: 45.1 % (ref 34.8–46.1)
HDLC SERPL-MCNC: 36 MG/DL
HGB BLD-MCNC: 15 G/DL (ref 11.5–15.4)
IMM GRANULOCYTES # BLD AUTO: 0.04 THOUSAND/UL (ref 0–0.2)
IMM GRANULOCYTES NFR BLD AUTO: 1 % (ref 0–2)
LDLC SERPL CALC-MCNC: 82 MG/DL (ref 0–100)
LYMPHOCYTES # BLD AUTO: 2.61 THOUSANDS/ÂΜL (ref 0.6–4.47)
LYMPHOCYTES NFR BLD AUTO: 36 % (ref 14–44)
MCH RBC QN AUTO: 32.1 PG (ref 26.8–34.3)
MCHC RBC AUTO-ENTMCNC: 33.3 G/DL (ref 31.4–37.4)
MCV RBC AUTO: 96 FL (ref 82–98)
MICROALBUMIN UR-MCNC: 30.8 MG/L
MICROALBUMIN/CREAT 24H UR: 22 MG/G CREATININE (ref 0–30)
MONOCYTES # BLD AUTO: 0.72 THOUSAND/ÂΜL (ref 0.17–1.22)
MONOCYTES NFR BLD AUTO: 10 % (ref 4–12)
NEUTROPHILS # BLD AUTO: 2.85 THOUSANDS/ÂΜL (ref 1.85–7.62)
NEUTS SEG NFR BLD AUTO: 39 % (ref 43–75)
NONHDLC SERPL-MCNC: 129 MG/DL
NRBC BLD AUTO-RTO: 0 /100 WBCS
PLATELET # BLD AUTO: 291 THOUSANDS/UL (ref 149–390)
PMV BLD AUTO: 11.1 FL (ref 8.9–12.7)
POTASSIUM SERPL-SCNC: 4 MMOL/L (ref 3.5–5.3)
PROT SERPL-MCNC: 7.8 G/DL (ref 6.4–8.4)
RBC # BLD AUTO: 4.68 MILLION/UL (ref 3.81–5.12)
SODIUM SERPL-SCNC: 138 MMOL/L (ref 135–147)
T4 FREE SERPL-MCNC: 0.63 NG/DL (ref 0.61–1.12)
TRIGL SERPL-MCNC: 233 MG/DL
TSH SERPL DL<=0.05 MIU/L-ACNC: 10.15 UIU/ML (ref 0.45–4.5)
WBC # BLD AUTO: 7.25 THOUSAND/UL (ref 4.31–10.16)

## 2023-09-07 PROCEDURE — 80053 COMPREHEN METABOLIC PANEL: CPT

## 2023-09-07 PROCEDURE — 80061 LIPID PANEL: CPT

## 2023-09-07 PROCEDURE — 82570 ASSAY OF URINE CREATININE: CPT

## 2023-09-07 PROCEDURE — 84439 ASSAY OF FREE THYROXINE: CPT

## 2023-09-07 PROCEDURE — 36415 COLL VENOUS BLD VENIPUNCTURE: CPT

## 2023-09-07 PROCEDURE — 85025 COMPLETE CBC W/AUTO DIFF WBC: CPT

## 2023-09-07 PROCEDURE — 82043 UR ALBUMIN QUANTITATIVE: CPT

## 2023-09-07 PROCEDURE — 83036 HEMOGLOBIN GLYCOSYLATED A1C: CPT

## 2023-09-07 PROCEDURE — 84443 ASSAY THYROID STIM HORMONE: CPT

## 2023-09-08 DIAGNOSIS — E03.8 OTHER SPECIFIED HYPOTHYROIDISM: ICD-10-CM

## 2023-09-08 RX ORDER — LEVOTHYROXINE SODIUM 0.2 MG/1
TABLET ORAL
Qty: 90 TABLET | Refills: 0
Start: 2023-09-08

## 2023-09-28 DIAGNOSIS — E03.8 OTHER SPECIFIED HYPOTHYROIDISM: ICD-10-CM

## 2023-09-28 RX ORDER — LEVOTHYROXINE SODIUM 0.2 MG/1
TABLET ORAL
Qty: 90 TABLET | Refills: 0 | Status: SHIPPED | OUTPATIENT
Start: 2023-09-28 | End: 2023-09-29 | Stop reason: SDUPTHER

## 2023-09-29 ENCOUNTER — OFFICE VISIT (OUTPATIENT)
Dept: FAMILY MEDICINE CLINIC | Facility: HOSPITAL | Age: 63
End: 2023-09-29
Payer: COMMERCIAL

## 2023-09-29 VITALS
WEIGHT: 202.6 LBS | DIASTOLIC BLOOD PRESSURE: 74 MMHG | BODY MASS INDEX: 38.25 KG/M2 | TEMPERATURE: 97.7 F | HEART RATE: 84 BPM | HEIGHT: 61 IN | SYSTOLIC BLOOD PRESSURE: 122 MMHG

## 2023-09-29 DIAGNOSIS — G89.29 HEEL PAIN, CHRONIC, LEFT: ICD-10-CM

## 2023-09-29 DIAGNOSIS — E03.8 OTHER SPECIFIED HYPOTHYROIDISM: ICD-10-CM

## 2023-09-29 DIAGNOSIS — R79.89 ELEVATED TSH: ICD-10-CM

## 2023-09-29 DIAGNOSIS — I10 ESSENTIAL HYPERTENSION: ICD-10-CM

## 2023-09-29 DIAGNOSIS — E78.5 DYSLIPIDEMIA: ICD-10-CM

## 2023-09-29 DIAGNOSIS — M79.672 HEEL PAIN, CHRONIC, LEFT: ICD-10-CM

## 2023-09-29 DIAGNOSIS — E11.9 CONTROLLED TYPE 2 DIABETES MELLITUS WITHOUT COMPLICATION, WITHOUT LONG-TERM CURRENT USE OF INSULIN (HCC): Primary | ICD-10-CM

## 2023-09-29 DIAGNOSIS — Z23 ENCOUNTER FOR IMMUNIZATION: ICD-10-CM

## 2023-09-29 PROCEDURE — 90472 IMMUNIZATION ADMIN EACH ADD: CPT

## 2023-09-29 PROCEDURE — 99214 OFFICE O/P EST MOD 30 MIN: CPT | Performed by: INTERNAL MEDICINE

## 2023-09-29 PROCEDURE — 90686 IIV4 VACC NO PRSV 0.5 ML IM: CPT

## 2023-09-29 PROCEDURE — 90750 HZV VACC RECOMBINANT IM: CPT

## 2023-09-29 PROCEDURE — 90471 IMMUNIZATION ADMIN: CPT

## 2023-09-29 RX ORDER — LEVOTHYROXINE SODIUM 0.2 MG/1
TABLET ORAL
Qty: 90 TABLET | Refills: 0
Start: 2023-09-29

## 2023-09-29 NOTE — ASSESSMENT & PLAN NOTE
TSH elevated and we increased levothyroxine from 1 tab 6 days a week and 1.5 one day a week to 1.5 tab 3x's a week and 1 tab 4 times a week, recheck BW in 6 wks - order given

## 2023-09-29 NOTE — ASSESSMENT & PLAN NOTE
Lab Results   Component Value Date    HGBA1C 6.2 (H) 09/07/2023   DM type 2 with microalbuminuria - controlled with A1C 6.2 - con't to encourage watching diet and keep active, con't current DM meds, recheck BW in 6 mos - BW order given, UTD on DM foot exam (3/23) and eye exam (3/23), on a statin and ACE, will follow

## 2023-09-29 NOTE — PROGRESS NOTES
Name: Alberto Ye      : 1960      MRN: [id number]  Encounter Provider: Holland Pleitez DO  Encounter Date: 2023   Encounter department: [address]     1. Controlled type 2 diabetes mellitus without complication, without long-term current use of insulin (HCC)  Assessment & Plan:    Lab Results   Component Value Date    HGBA1C 6.2 (H) 2023   DM type 2 with microalbuminuria - controlled with A1C 6.2 - con't to encourage watching diet and keep active, con't current DM meds, recheck BW in 6 mos - BW order given, UTD on DM foot exam (3/23) and eye exam (3/23), on a statin and ACE, will follow    Orders:  -     Hemoglobin A1C; Future; Expected date: 2024  -     Glucose, fasting; Future; Expected date: 2024    2. Dyslipidemia  Assessment & Plan:  LDL at goal, con't current statin, TG up and HDL low - urged diet/exercise/wgt loss, will follow      3. Other specified hypothyroidism  Assessment & Plan:  TSH elevated and we increased levothyroxine from 1 tab 6 days a week and 1.5 one day a week to 1.5 tab 3x's a week and 1 tab 4 times a week, recheck BW in 6 wks - order given    Orders:  -     levothyroxine 200 mcg tablet; TAKE ONE TABLET BY MOUTH Four DAYS A WEEK, TAKE ONE AND ONE-HALF TABLET BY MOUTH ON Fri Sat and SUN  -     TSH, 3rd generation with Free T4 reflex; Future; Expected date: 2023    4. Elevated TSH  Comments:  Recheck BW in 6 wks after adjusting levothyroxine, will follow  Orders:  -     TSH, 3rd generation with Free T4 reflex; Future; Expected date: 2023    5. Heel pain, chronic, left  Comments:  Urged stretches, good supportive shoes, encouraged heat and Tylenol prn, number for podiatry given upon request as well  Orders:  -     Ambulatory Referral to Podiatry; Future    6. Essential hypertension  Assessment & Plan:  BP at goal, con't current meds, recheck in 6 mos      7.  Encounter for immunization  - influenza vaccine, quadrivalent, 0.5 mL, preservative-free, for adult and pediatric patients 6 mos+ (AFLURIA, FLUARIX, FLULAVAL, FLUZONE)  -     Zoster Vaccine Recombinant IM        Depression Screening and Follow-up Plan: Patient was screened for depression during today's encounter. They screened negative with a PHQ-2 score of 0. Colonoscopy 9/14 - 10 yrs    Mammo 7/23    PAP s/p hysterectomy    BW 9/23    Pt is agreeable to flu and second Shingrix      Subjective      HPI Pt here for follow up appt and BW results    BW results were d/w pt in detail: FBS/A1C 89/6.2, rest of CMP/CBC were wnl, FLP with low HDL and TG were up, urine microalbumin:Cr ratio was nml, TSH was elevated at 10.153     Def of controlled vs uncontrolled DM was reviewed. Diet was reviewed - wgt down 1 lb from March 23. She is taking her DM meds as directed. She is UTD on DM foot exam (3/23) and eye exam (3/23). She is on statin and ACE. Goal FLP was d/w pt in detail. Diet/exercise reviewed as noted above. She is taking her Pravastatin daily as directed w/o Se. She notes no stroke/TIA symptoms/CP. Pt is taking their thyroid medication daily w/o any other meds and prior to eating. She was notified of elevated TSH via phone and we subsequently changed her levothyroxine to 200 mcg 1 tab daily except 1.5 tab 3 x's a week. They deny any significant wgt changes/fatigue/C/D/tremor/palp. She has had persistent hair loss but its not new or worse. She denies any other skin changes other then her psoriasis. BP at goal today and meds were reviewed and no changes have occurred. She denies missing doses of meds or SE with the meds. She does not check her BP outside the office. She notes no frequent Ha's/dizziness/double vision/CP. Pt saw GYN Onc in Aug for f/u endometrial CA - OV note reviewed. She has no vaginal symptoms but con't to note decrease libido.   She was told to f/u in 1 yr        Review of Systems   Constitutional: Negative for chills, fever and unexpected weight change. Eyes:  Negative for pain and visual disturbance. Respiratory:  Negative for cough and shortness of breath. Cardiovascular:  Negative for chest pain, palpitations and leg swelling. Gastrointestinal:  Negative for abdominal pain, blood in stool, constipation, diarrhea, nausea and vomiting. Genitourinary:  Negative for difficulty urinating and dysuria. Musculoskeletal:  Negative for back pain and neck pain. Skin:  Negative for rash and wound. Neurological:  Negative for dizziness and headaches. Psychiatric/Behavioral:  Negative for confusion. Current Outpatient Medications on File Prior to Visit   Medication Sig    albuterol (PROVENTIL HFA,VENTOLIN HFA) 90 mcg/act inhaler Inhale 2 puffs every 4 (four) hours as needed for wheezing or shortness of breath    cholecalciferol (VITAMIN D3) 1,000 units tablet Take 1 tablet by mouth daily    fenofibrate micronized (LOFIBRA) 200 MG capsule Take 1 capsule (200 mg total) by mouth daily    lisinopril (ZESTRIL) 30 mg tablet Take 1 tablet (30 mg total) by mouth daily    metFORMIN (GLUCOPHAGE-XR) 500 mg 24 hr tablet TAKE 1 TABLET BY MOUTH IN THE MORNING (Patient not taking: Reported on 8/1/2023)    pravastatin (PRAVACHOL) 20 mg tablet Take 1 tablet by mouth once daily    semaglutide, 0.25 or 0.5 mg/dose, (Ozempic, 0.25 or 0.5 MG/DOSE,) 2 mg/3 mL injection pen Inject 0.75 mL (0.5 mg total) under the skin every 7 days    Taltz 80 MG/ML SOAJ     triamcinolone (KENALOG) 0.1 % cream APPLY TO AFFECTED AREA(S) TWICE DAILY AS NEEDED    [DISCONTINUED] levothyroxine 200 mcg tablet TAKE ONE TABLET BY MOUTH SIX DAYS A WEEK, TAKE ONE AND ONE-HALF TABLET BY MOUTH ONE DAY A WEEK       Objective     /74   Pulse 84   Temp 97.7 °F (36.5 °C) (Tympanic)   Ht 5' 1" (1.549 m)   Wt 91.9 kg (202 lb 9.6 oz)   LMP 04/10/2020   BMI 38.28 kg/m²     Physical Exam  Vitals and nursing note reviewed.    Constitutional: General: She is not in acute distress. Appearance: She is not ill-appearing. HENT:      Head: Normocephalic and atraumatic. Eyes:      General:         Right eye: No discharge. Left eye: No discharge. Conjunctiva/sclera: Conjunctivae normal.   Pulmonary:      Effort: Pulmonary effort is normal. No respiratory distress. Skin:     Coloration: Skin is not pale. Findings: No bruising. Psychiatric:         Mood and Affect: Mood normal.         Behavior: Behavior normal.         Thought Content:  Thought content normal.         Judgment: Judgment normal.       Tila Barrett, DO

## 2023-10-10 DIAGNOSIS — E78.5 DYSLIPIDEMIA: ICD-10-CM

## 2023-10-10 RX ORDER — PRAVASTATIN SODIUM 20 MG
TABLET ORAL
Qty: 90 TABLET | Refills: 0 | Status: SHIPPED | OUTPATIENT
Start: 2023-10-10

## 2023-11-14 ENCOUNTER — APPOINTMENT (OUTPATIENT)
Dept: LAB | Facility: HOSPITAL | Age: 63
End: 2023-11-14
Payer: COMMERCIAL

## 2023-11-14 DIAGNOSIS — E03.8 OTHER SPECIFIED HYPOTHYROIDISM: ICD-10-CM

## 2023-11-14 DIAGNOSIS — R79.89 LOW TSH LEVEL: Primary | ICD-10-CM

## 2023-11-14 DIAGNOSIS — R79.89 ELEVATED TSH: ICD-10-CM

## 2023-11-14 DIAGNOSIS — E11.9 CONTROLLED TYPE 2 DIABETES MELLITUS WITHOUT COMPLICATION, WITHOUT LONG-TERM CURRENT USE OF INSULIN (HCC): ICD-10-CM

## 2023-11-14 LAB
T4 FREE SERPL-MCNC: 1.22 NG/DL (ref 0.61–1.12)
TSH SERPL DL<=0.05 MIU/L-ACNC: 0.27 UIU/ML (ref 0.45–4.5)

## 2023-11-14 PROCEDURE — 84439 ASSAY OF FREE THYROXINE: CPT

## 2023-11-14 PROCEDURE — 84443 ASSAY THYROID STIM HORMONE: CPT

## 2023-11-14 PROCEDURE — 36415 COLL VENOUS BLD VENIPUNCTURE: CPT

## 2023-11-14 RX ORDER — LEVOTHYROXINE SODIUM 0.2 MG/1
TABLET ORAL
Qty: 90 TABLET | Refills: 0
Start: 2023-11-14

## 2023-12-27 ENCOUNTER — APPOINTMENT (OUTPATIENT)
Dept: LAB | Facility: HOSPITAL | Age: 63
End: 2023-12-27
Payer: COMMERCIAL

## 2023-12-27 DIAGNOSIS — R79.89 LOW TSH LEVEL: Primary | ICD-10-CM

## 2023-12-27 DIAGNOSIS — R79.89 LOW TSH LEVEL: ICD-10-CM

## 2023-12-27 DIAGNOSIS — E03.8 OTHER SPECIFIED HYPOTHYROIDISM: ICD-10-CM

## 2023-12-27 LAB
T4 FREE SERPL-MCNC: 1.37 NG/DL (ref 0.61–1.12)
TSH SERPL DL<=0.05 MIU/L-ACNC: 0.06 UIU/ML (ref 0.45–4.5)

## 2023-12-27 PROCEDURE — 84443 ASSAY THYROID STIM HORMONE: CPT

## 2023-12-27 PROCEDURE — 84439 ASSAY OF FREE THYROXINE: CPT

## 2023-12-27 PROCEDURE — 36415 COLL VENOUS BLD VENIPUNCTURE: CPT

## 2023-12-28 DIAGNOSIS — E11.9 CONTROLLED TYPE 2 DIABETES MELLITUS WITHOUT COMPLICATION, WITHOUT LONG-TERM CURRENT USE OF INSULIN (HCC): ICD-10-CM

## 2024-02-16 DIAGNOSIS — E03.8 OTHER SPECIFIED HYPOTHYROIDISM: ICD-10-CM

## 2024-02-16 RX ORDER — LEVOTHYROXINE SODIUM 0.2 MG/1
TABLET ORAL
Qty: 90 TABLET | Refills: 0 | Status: SHIPPED | OUTPATIENT
Start: 2024-02-16

## 2024-03-04 DIAGNOSIS — E78.1 ESSENTIAL HYPERTRIGLYCERIDEMIA: ICD-10-CM

## 2024-03-04 RX ORDER — FENOFIBRATE 200 MG/1
200 CAPSULE ORAL DAILY
Qty: 30 CAPSULE | Refills: 0 | Status: SHIPPED | OUTPATIENT
Start: 2024-03-04 | End: 2024-03-13 | Stop reason: SDUPTHER

## 2024-03-04 NOTE — TELEPHONE ENCOUNTER
Please notify patient that their medication was approved for 30 days and no refills were given. She is overdue for follow up appt and DM labs.  She needs to make an appt within the next 30 days and have BW done prior to appt to con't receiving medications.  TY

## 2024-03-05 NOTE — TELEPHONE ENCOUNTER
Left detailed message, advised to call with any further questions or concerns.    Statement Selected

## 2024-03-12 DIAGNOSIS — I10 ESSENTIAL HYPERTENSION: ICD-10-CM

## 2024-03-12 DIAGNOSIS — E11.9 TYPE 2 DIABETES MELLITUS WITHOUT COMPLICATION, WITHOUT LONG-TERM CURRENT USE OF INSULIN (HCC): ICD-10-CM

## 2024-03-12 RX ORDER — LISINOPRIL 30 MG/1
30 TABLET ORAL DAILY
Qty: 90 TABLET | Refills: 0 | Status: SHIPPED | OUTPATIENT
Start: 2024-03-12

## 2024-03-13 ENCOUNTER — APPOINTMENT (OUTPATIENT)
Dept: LAB | Facility: HOSPITAL | Age: 64
End: 2024-03-13
Payer: COMMERCIAL

## 2024-03-13 DIAGNOSIS — E78.1 ESSENTIAL HYPERTRIGLYCERIDEMIA: ICD-10-CM

## 2024-03-13 DIAGNOSIS — E03.8 OTHER SPECIFIED HYPOTHYROIDISM: ICD-10-CM

## 2024-03-13 DIAGNOSIS — R79.89 LOW TSH LEVEL: ICD-10-CM

## 2024-03-13 LAB
EST. AVERAGE GLUCOSE BLD GHB EST-MCNC: 128 MG/DL
GLUCOSE P FAST SERPL-MCNC: 106 MG/DL (ref 65–99)
HBA1C MFR BLD: 6.1 %
T4 FREE SERPL-MCNC: 1.22 NG/DL (ref 0.61–1.12)
TSH SERPL DL<=0.05 MIU/L-ACNC: 0.05 UIU/ML (ref 0.45–4.5)

## 2024-03-13 PROCEDURE — 84439 ASSAY OF FREE THYROXINE: CPT

## 2024-03-13 PROCEDURE — 84443 ASSAY THYROID STIM HORMONE: CPT

## 2024-03-13 RX ORDER — LEVOTHYROXINE SODIUM 175 UG/1
TABLET ORAL
Qty: 90 TABLET | Refills: 0 | Status: SHIPPED | OUTPATIENT
Start: 2024-03-13

## 2024-03-13 RX ORDER — FENOFIBRATE 200 MG/1
200 CAPSULE ORAL DAILY
Qty: 90 CAPSULE | Refills: 1 | Status: SHIPPED | OUTPATIENT
Start: 2024-03-13

## 2024-03-18 DIAGNOSIS — E11.9 CONTROLLED TYPE 2 DIABETES MELLITUS WITHOUT COMPLICATION, WITHOUT LONG-TERM CURRENT USE OF INSULIN (HCC): ICD-10-CM

## 2024-03-18 RX ORDER — SEMAGLUTIDE 0.68 MG/ML
INJECTION, SOLUTION SUBCUTANEOUS
Qty: 9 ML | Refills: 3 | Status: SHIPPED | OUTPATIENT
Start: 2024-03-18

## 2024-05-22 DIAGNOSIS — E11.9 CONTROLLED TYPE 2 DIABETES MELLITUS WITHOUT COMPLICATION, WITHOUT LONG-TERM CURRENT USE OF INSULIN (HCC): Primary | ICD-10-CM

## 2024-05-22 RX ORDER — TIRZEPATIDE 2.5 MG/.5ML
2.5 INJECTION, SOLUTION SUBCUTANEOUS WEEKLY
Qty: 2 ML | Refills: 0 | Status: SHIPPED | OUTPATIENT
Start: 2024-05-22

## 2024-05-22 RX ORDER — TIRZEPATIDE 5 MG/.5ML
5 INJECTION, SOLUTION SUBCUTANEOUS WEEKLY
Qty: 4 ML | Refills: 0 | Status: SHIPPED | OUTPATIENT
Start: 2024-06-21

## 2024-05-24 ENCOUNTER — APPOINTMENT (OUTPATIENT)
Dept: LAB | Facility: HOSPITAL | Age: 64
End: 2024-05-24
Payer: COMMERCIAL

## 2024-05-24 DIAGNOSIS — E03.8 OTHER SPECIFIED HYPOTHYROIDISM: ICD-10-CM

## 2024-05-24 LAB — TSH SERPL DL<=0.05 MIU/L-ACNC: 1.02 UIU/ML (ref 0.45–4.5)

## 2024-05-24 PROCEDURE — 36415 COLL VENOUS BLD VENIPUNCTURE: CPT

## 2024-05-24 PROCEDURE — 84443 ASSAY THYROID STIM HORMONE: CPT

## 2024-05-27 ENCOUNTER — TELEPHONE (OUTPATIENT)
Age: 64
End: 2024-05-27

## 2024-05-27 NOTE — TELEPHONE ENCOUNTER
PA for Mounjaro Approved     Date(s) approved 4/27/24-5/27/25    Case #  [id number]       Patient advised by          [x] [employer] Message  [] Phone call   []LMOM  []L/M to call office as no active Communication consent on file  []Unable to leave detailed message as VM not approved on Communication consent       Pharmacy advised by    [x]Fax  []Phone call    Approval letter scanned into Media Yes

## 2024-05-27 NOTE — TELEPHONE ENCOUNTER
PA for Mounjaro 2.5 MG    Submitted via    []CMM-KEY    [x][employer]-Case ID # [id number]   []Faxed to plan   []Other website    []Phone call Case ID #      Office notes sent, clinical questions answered. Awaiting determination    Turnaround time for your insurance to make a decision on your Prior Authorization can take 7-21 business days.

## 2024-06-03 DIAGNOSIS — E11.9 CONTROLLED TYPE 2 DIABETES MELLITUS WITHOUT COMPLICATION, WITHOUT LONG-TERM CURRENT USE OF INSULIN (HCC): ICD-10-CM

## 2024-06-03 RX ORDER — TIRZEPATIDE 2.5 MG/.5ML
2.5 INJECTION, SOLUTION SUBCUTANEOUS WEEKLY
Qty: 6 ML | Refills: 1 | Status: SHIPPED | OUTPATIENT
Start: 2024-06-03

## 2024-06-04 DIAGNOSIS — E11.9 TYPE 2 DIABETES MELLITUS WITHOUT COMPLICATION, WITHOUT LONG-TERM CURRENT USE OF INSULIN (HCC): ICD-10-CM

## 2024-06-04 DIAGNOSIS — I10 ESSENTIAL HYPERTENSION: ICD-10-CM

## 2024-06-05 RX ORDER — LISINOPRIL 30 MG/1
30 TABLET ORAL DAILY
Qty: 30 TABLET | Refills: 0 | Status: SHIPPED | OUTPATIENT
Start: 2024-06-05

## 2024-06-06 DIAGNOSIS — E03.8 OTHER SPECIFIED HYPOTHYROIDISM: ICD-10-CM

## 2024-06-06 RX ORDER — LEVOTHYROXINE SODIUM 175 UG/1
TABLET ORAL
Qty: 90 TABLET | Refills: 0 | Status: SHIPPED | OUTPATIENT
Start: 2024-06-06

## 2024-06-24 DIAGNOSIS — E11.9 CONTROLLED TYPE 2 DIABETES MELLITUS WITHOUT COMPLICATION, WITHOUT LONG-TERM CURRENT USE OF INSULIN (HCC): ICD-10-CM

## 2024-06-24 RX ORDER — TIRZEPATIDE 5 MG/.5ML
5 INJECTION, SOLUTION SUBCUTANEOUS WEEKLY
Qty: 6 ML | Refills: 0 | Status: SHIPPED | OUTPATIENT
Start: 2024-06-24 | End: 2024-09-22

## 2024-06-28 ENCOUNTER — OFFICE VISIT (OUTPATIENT)
Dept: FAMILY MEDICINE CLINIC | Facility: HOSPITAL | Age: 64
End: 2024-06-28
Payer: COMMERCIAL

## 2024-06-28 VITALS
WEIGHT: 201.8 LBS | DIASTOLIC BLOOD PRESSURE: 78 MMHG | TEMPERATURE: 96.9 F | HEART RATE: 90 BPM | HEIGHT: 61 IN | OXYGEN SATURATION: 98 % | SYSTOLIC BLOOD PRESSURE: 118 MMHG | BODY MASS INDEX: 38.1 KG/M2

## 2024-06-28 DIAGNOSIS — Z00.00 ANNUAL PHYSICAL EXAM: Primary | ICD-10-CM

## 2024-06-28 DIAGNOSIS — E11.29 MICROALBUMINURIA DUE TO TYPE 2 DIABETES MELLITUS  (HCC): ICD-10-CM

## 2024-06-28 DIAGNOSIS — R80.9 MICROALBUMINURIA DUE TO TYPE 2 DIABETES MELLITUS  (HCC): ICD-10-CM

## 2024-06-28 DIAGNOSIS — E11.9 CONTROLLED TYPE 2 DIABETES MELLITUS WITHOUT COMPLICATION, WITHOUT LONG-TERM CURRENT USE OF INSULIN (HCC): ICD-10-CM

## 2024-06-28 DIAGNOSIS — E66.01 SEVERE OBESITY (BMI 35.0-39.9) WITH COMORBIDITY (HCC): ICD-10-CM

## 2024-06-28 PROCEDURE — 99396 PREV VISIT EST AGE 40-64: CPT | Performed by: INTERNAL MEDICINE

## 2024-06-28 NOTE — ASSESSMENT & PLAN NOTE
Lab Results   Component Value Date    HGBA1C 6.1 (H) 03/13/2024   DM type 2 with microalbuminuria - controlled with A1c 6.1 - urged  to con't healthy diet and keep active, con't current Mounjaro for now, BW due in Sept - order given, DM foot exam done in office today, DM eye exam 3/23 - 2 yrs, she is on an ACE and statin, will follow

## 2024-06-28 NOTE — PROGRESS NOTES
Adult Annual Physical  Name: Giovana Waldron      : 1960      MRN: [id number]  Encounter Provider: Lucy Ortiz DO  Encounter Date: 2024   Encounter department: [facility] [address] [address]     Assessment & Plan   1. Annual physical exam  2. Controlled type 2 diabetes mellitus without complication, without long-term current use of insulin (HCC)  Assessment & Plan:    Lab Results   Component Value Date    HGBA1C 6.1 (H) 2024   DM type 2 with microalbuminuria - controlled with A1c 6.1 - urged  to con't healthy diet and keep active, con't current Mounjaro for now, BW due in Sept - order given, DM foot exam done in office today, DM eye exam 3/23 - 2 yrs, she is on an ACE and statin, will follow   Orders:  -     CBC and Platelet; Future; Expected date: 2024  -     Comprehensive metabolic panel; Future; Expected date: 2024  -     Albumin / creatinine urine ratio; Future; Expected date: 2024  -     Lipid Panel with Direct LDL reflex; Future; Expected date: 2024  -     TSH, 3rd generation with Free T4 reflex; Future; Expected date: 2024  -     Hemoglobin A1C; Future; Expected date: 2024  3. Microalbuminuria due to type 2 diabetes mellitus  (HCC)  Assessment & Plan:  Urine test due in Sept - order given, she is on an ACE, will follow  Lab Results   Component Value Date    HGBA1C 6.1 (H) 2024     Orders:  -     CBC and Platelet; Future; Expected date: 2024  -     Comprehensive metabolic panel; Future; Expected date: 2024  -     Albumin / creatinine urine ratio; Future; Expected date: 2024  -     Lipid Panel with Direct LDL reflex; Future; Expected date: 2024  -     TSH, 3rd generation with Free T4 reflex; Future; Expected date: 2024  -     Hemoglobin A1C; Future; Expected date: 2024  4. Severe obesity (BMI 35.0-39.9) with comorbidity (HCC)  Comments:  Encouraged healthy diet and regular exercise, on Mounjaro for  DM/wgt loss, will follow  5. BMI 38.0-38.9,adult    Immunizations and preventive care screenings were discussed with patient today. Appropriate education was printed on patient's after visit summary.    Counseling:  Alcohol/drug use: discussed moderation in alcohol intake, the recommendations for healthy alcohol use, and avoidance of illicit drug use.  Dental Health: discussed importance of regular tooth brushing, flossing, and dental visits.  Injury prevention: discussed safety/seat belts, safety helmets, smoke detectors, carbon dioxide detectors, and smoking near bedding or upholstery.  Exercise: the importance of regular exercise/physical activity was discussed. Recommend exercise 3-5 times per week for at least 30 minutes.   Cervical cancer screening: S/p hysterectomy - follows [facility]  Breast cancer screening: Mammo 7/23 - scheduled for 2024  Colon cancer screening: Colonoscopy 9/14 - 10 yrs - advised due this Sept 2024      Depression Screening and Follow-up Plan: Patient was screened for depression during today's encounter. They screened negative with a PHQ-2 score of 0.    Tobacco Cessation Counseling: Tobacco cessation counseling was provided. The patient is sincerely urged to quit consumption of tobacco. She is not ready to quit tobacco.     BW 3/24 (A1C 6.1)  DM foot exam done in office today  DM eye exam 3/23 - 2 yrs    History of Present Illness     Adult Annual Physical:  Patient presents for annual physical. DM labs from March reviewed.  She is due for DM foot exam.  She notes no pain in the feet and denies numbness/tingling. She has psoriasis on her feet but no ulcers    Noting L knee pain  x 3 mos. Pain happened after trip to [place] but she denies fall/injury/trauma/new activity.  She notes the knee feels like it will lock up and give out when she turns/twists.  She has had no actual falls.  She notes no swelling or redness.  She has tried OTC cream.  She has had no h/o knee surgery or  "problems. .     Diet and Physical Activity:  - Diet/Nutrition: diabetic diet.  - Exercise:. no formal dedicated exercise but active remodeling her house    Depression Screening:  - PHQ-2 Score: 0    General Health:  - Sleep: sleeps poorly. notes issues staying asleep  - Hearing: normal hearing bilateral ears.  - Vision: goes for regular eye exams and wears glasses.  - Dental: regular dental visits and brushes teeth twice daily.    /GYN Health:    - Menopause: postmenopausal.   - History of STDs: no  - Contraception: menopause.      Advanced Care Planning:  - Has an advanced directive?: no    - Has a durable medical POA?: no    - ACP document given to patient?: yes      Review of Systems   Constitutional:  Negative for chills, fever and unexpected weight change.   HENT:  Negative for congestion and hearing loss.    Eyes:  Negative for pain and visual disturbance.   Respiratory:  Negative for cough, shortness of breath and wheezing.    Cardiovascular:  Negative for chest pain and palpitations.   Gastrointestinal:  Negative for abdominal pain, blood in stool, constipation, diarrhea, nausea and vomiting.   Genitourinary:  Negative for difficulty urinating, dysuria, vaginal bleeding, vaginal discharge and vaginal pain.   Musculoskeletal:  Positive for arthralgias. Negative for back pain and neck pain.   Skin:  Negative for wound.        Psoriasis scars   Neurological:  Negative for dizziness, light-headedness and headaches.   Hematological:  Does not bruise/bleed easily.   Psychiatric/Behavioral:  Positive for sleep disturbance. Negative for confusion and dysphoric mood.          Objective     /78   Pulse 90   Temp (!) 96.9 °F (36.1 °C)   Ht 5' 1\" (1.549 m)   Wt 91.5 kg (201 lb 12.8 oz)   LMP 04/10/2020   SpO2 98%   BMI 38.13 kg/m²     Physical Exam  Vitals and nursing note reviewed.   Constitutional:       General: She is not in acute distress.     Appearance: She is well-developed. She is not " ill-appearing.   HENT:      Head: Normocephalic and atraumatic.      Right Ear: Tympanic membrane and external ear normal. There is no impacted cerumen.      Left Ear: Tympanic membrane and external ear normal. There is no impacted cerumen.      Mouth/Throat:      Mouth: Mucous membranes are moist.      Pharynx: Oropharynx is clear. No oropharyngeal exudate.   Eyes:      General:         Right eye: No discharge.         Left eye: No discharge.      Conjunctiva/sclera: Conjunctivae normal.   Neck:      Thyroid: No thyromegaly.      Trachea: No tracheal deviation.   Cardiovascular:      Rate and Rhythm: Normal rate and regular rhythm.      Pulses: no weak pulses.           Dorsalis pedis pulses are 2+ on the right side and 2+ on the left side.      Heart sounds: Normal heart sounds. No murmur heard.  Pulmonary:      Effort: Pulmonary effort is normal. No respiratory distress.      Breath sounds: Normal breath sounds. No wheezing, rhonchi or rales.   Abdominal:      General: There is no distension.      Palpations: Abdomen is soft.      Tenderness: There is no abdominal tenderness. There is no guarding or rebound.   Musculoskeletal:         General: No deformity or signs of injury.      Cervical back: Neck supple.   Feet:      Right foot:      Skin integrity: Callus present. No ulcer, skin breakdown, erythema, warmth or dry skin.      Left foot:      Skin integrity: Callus present. No ulcer, skin breakdown, erythema, warmth or dry skin.   Lymphadenopathy:      Cervical: No cervical adenopathy.   Skin:     General: Skin is warm and dry.      Coloration: Skin is not pale.      Findings: No bruising or rash.      Comments: Scars from previous psoriatic plaques present on UE and LE   Neurological:      General: No focal deficit present.      Mental Status: She is alert. Mental status is at baseline.      Motor: No abnormal muscle tone.      Gait: Gait normal.   Psychiatric:         Mood and Affect: Mood normal.          Behavior: Behavior normal.         Thought Content: Thought content normal.         Judgment: Judgment normal.     Diabetic Foot Exam    Patient's shoes and socks removed.    Right Foot/Ankle   Right Foot Inspection  Skin Exam: skin normal, skin intact, callus and callus. No dry skin, no warmth, no erythema, no maceration, no abnormal color, no pre-ulcer and no ulcer.     Toe Exam: ROM and strength within normal limits.     Sensory   Vibration: intact  Monofilament testing: intact    Vascular  The right DP pulse is 2+.     Left Foot/Ankle  Left Foot Inspection  Skin Exam: skin normal, skin intact and callus. No dry skin, no warmth, no erythema, no maceration, normal color, no pre-ulcer and no ulcer.     Toe Exam: ROM and strength within normal limits.     Sensory   Vibration: intact  Monofilament testing: intact    Vascular  The left DP pulse is 2+.     Assign Risk Category  No deformity present  No loss of protective sensation  No weak pulses  Risk: 0    Administrative Statements

## 2024-06-28 NOTE — ASSESSMENT & PLAN NOTE
Urine test due in Sept - order given, she is on an ACE, will follow  Lab Results   Component Value Date    HGBA1C 6.1 (H) 03/13/2024

## 2024-07-07 DIAGNOSIS — E11.9 TYPE 2 DIABETES MELLITUS WITHOUT COMPLICATION, WITHOUT LONG-TERM CURRENT USE OF INSULIN (HCC): ICD-10-CM

## 2024-07-07 DIAGNOSIS — I10 ESSENTIAL HYPERTENSION: ICD-10-CM

## 2024-07-08 DIAGNOSIS — M25.569 KNEE PAIN, UNSPECIFIED CHRONICITY, UNSPECIFIED LATERALITY: Primary | ICD-10-CM

## 2024-07-08 RX ORDER — LISINOPRIL 30 MG/1
30 TABLET ORAL DAILY
Qty: 30 TABLET | Refills: 5 | Status: SHIPPED | OUTPATIENT
Start: 2024-07-08

## 2024-07-12 ENCOUNTER — OFFICE VISIT (OUTPATIENT)
Dept: OBGYN CLINIC | Facility: CLINIC | Age: 64
End: 2024-07-12
Payer: COMMERCIAL

## 2024-07-12 ENCOUNTER — APPOINTMENT (OUTPATIENT)
Dept: RADIOLOGY | Facility: CLINIC | Age: 64
End: 2024-07-12
Payer: COMMERCIAL

## 2024-07-12 VITALS
BODY MASS INDEX: 38.14 KG/M2 | SYSTOLIC BLOOD PRESSURE: 107 MMHG | HEART RATE: 85 BPM | WEIGHT: 202 LBS | DIASTOLIC BLOOD PRESSURE: 67 MMHG | HEIGHT: 61 IN

## 2024-07-12 DIAGNOSIS — G89.29 CHRONIC PAIN OF LEFT KNEE: ICD-10-CM

## 2024-07-12 DIAGNOSIS — M17.12 PRIMARY OSTEOARTHRITIS OF LEFT KNEE: Primary | ICD-10-CM

## 2024-07-12 DIAGNOSIS — M25.561 RIGHT KNEE PAIN, UNSPECIFIED CHRONICITY: ICD-10-CM

## 2024-07-12 DIAGNOSIS — Z01.89 ENCOUNTER FOR LOWER EXTREMITY COMPARISON IMAGING STUDY: ICD-10-CM

## 2024-07-12 DIAGNOSIS — M25.562 CHRONIC PAIN OF LEFT KNEE: ICD-10-CM

## 2024-07-12 DIAGNOSIS — M25.569 KNEE PAIN, UNSPECIFIED CHRONICITY, UNSPECIFIED LATERALITY: ICD-10-CM

## 2024-07-12 DIAGNOSIS — M25.462 EFFUSION OF LEFT KNEE: ICD-10-CM

## 2024-07-12 PROCEDURE — 73564 X-RAY EXAM KNEE 4 OR MORE: CPT

## 2024-07-12 PROCEDURE — 77073 BONE LENGTH STUDIES: CPT

## 2024-07-12 PROCEDURE — 73560 X-RAY EXAM OF KNEE 1 OR 2: CPT

## 2024-07-12 PROCEDURE — 99203 OFFICE O/P NEW LOW 30 MIN: CPT | Performed by: STUDENT IN AN ORGANIZED HEALTH CARE EDUCATION/TRAINING PROGRAM

## 2024-07-12 PROCEDURE — 20610 DRAIN/INJ JOINT/BURSA W/O US: CPT | Performed by: STUDENT IN AN ORGANIZED HEALTH CARE EDUCATION/TRAINING PROGRAM

## 2024-07-12 RX ADMIN — LIDOCAINE HYDROCHLORIDE 4 ML: 10 INJECTION, SOLUTION INFILTRATION; PERINEURAL at 13:00

## 2024-07-12 RX ADMIN — TRIAMCINOLONE ACETONIDE 40 MG: 40 INJECTION, SUSPENSION INTRA-ARTICULAR; INTRAMUSCULAR at 13:00

## 2024-07-12 NOTE — PROGRESS NOTES
Knee New Office Note    Assessment:     1. Primary osteoarthritis of left knee    2. Chronic pain of left knee    3. Encounter for lower extremity comparison imaging study    4. Effusion of left knee        Plan:     Problem List Items Addressed This Visit    None  Visit Diagnoses       Primary osteoarthritis of left knee    -  Primary    Relevant Orders    XR knee 4+ vw left injury    Chronic pain of left knee        Relevant Orders    XR bone length (scanogram)    Encounter for lower extremity comparison imaging study        Relevant Orders    XR bone length (scanogram)    XR knee 1 or 2 vw right    Effusion of left knee               Findings today are consistent with left knee osteoarthritis. Imaging and prognosis was reviewed with the patient today. Discussed treatment options including continued observation, low impact exercises, bracing, anti-inflammatories, physical therapy, cortisone injection, visco injection, versus surgical intervention. Aspiration and cortisone steroid injection was administered to the left knee today. Patient should avoid strenuous activities for the next 1-2 days. Patient should avoid vaccines for the next 2 weeks if possible. She should monitor glucose levels for the next 7 to 10 days. Can apply cold compress for soreness. If patient feels relief with the cortisone injections, procedure can be repeated every 3 months. Follow up in 3 months.    Subjective:     Patient ID: Giovana Waldron is a 63 y.o. female.  Chief Complaint:  HPI:    Patient seen in consultation at the request of REYMUNDO Cowart.       63 y.o. female presents to the office for evaluation of left knee pain ongoing since March 2024. She was referred to us by her primary care doctor, Dr. Ortiz. She notes that she may have had a twisting injury [place] at the airport at time of onset. She is experiencing lateral knee pain that goes into her calf. Her pain worsens with activities including prolonged  standing and standing. She notes that it feels as though her knee occasionally hyperextends on her. She has tried topicals and a home exercise program with minimal relief.    Allergy:  Allergies   Allergen Reactions    Methimazole Arthralgia     Reaction Date: 2011; Annotation - 2014: Joint pain     Medications:  all current active meds have been reviewed  Past Medical History:  Past Medical History:   Diagnosis Date    Anxiety     Endometrial cancer (HCC)     PMB (postmenopausal bleeding)     for hysterectomy today 2020-cancer dx    Psoriasis     Tobacco abuse      Past Surgical History:  Past Surgical History:   Procedure Laterality Date    CARPAL TUNNEL RELEASE Bilateral      SECTION, LOW TRANSVERSE      COLONOSCOPY      colo 14 - polyp at sigmoid colon - 6 yr if adenoma or 10 yr if hyperplastic pathology: early hyperplastic changes     CYSTOSCOPY N/A 2020    Procedure: CYSTOSCOPY;  Surgeon: Duke Bell MD;  [facility];  Service: Gynecology Oncology    GALLBLADDER SURGERY      HYSTERECTOMY      LAPAROSCOPY FOR ECTOPIC PREGNANCY      With excision    OOPHORECTOMY Bilateral     IN INJECTION PROCEDURE LYMPHANGIOGRAPHY Bilateral 2020    Procedure: LYMPHOGRAPHY WITH INDOCYANINE GREEN (ICG);  Surgeon: Duke Bell MD;  [facility];  Service: Gynecology Oncology    IN LAPS TOTAL HYSTERECT 250 GM/< W/RMVL TUBE/OVARY N/A 2020    Procedure: ROBOTIC TOTAL HYSTERECTOMY, BSO, PELVIC NODE DISSECTION;  Surgeon: Duke Bell MD;  [facility];  Service: Gynecology Oncology    IN NDSC WRST SURG W/RLS TRANSVRS CARPL LIGM Right 2019    Procedure: RELEASE CARPAL TUNNEL ENDOSCOPIC;  Surgeon: Mainor Limon MD;  [facility];  Service: Orthopedics    IN NDSC WRST SURG W/RLS TRANSVRS CARPL LIGM Left 2019    Procedure: RELEASE CARPAL TUNNEL ENDOSCOPIC;  Surgeon: Mainor Limon MD;  [facility];  Service:  Orthopedics    THYROID SURGERY      WRIST SURGERY       Family History:  Family History   Problem Relation Age of Onset    Asthma Mother     Diabetes Father     Hypertension Father     Lung cancer Father         age unknown    No Known Problems Sister     No Known Problems Sister     No Known Problems Maternal Grandmother     No Known Problems Maternal Grandfather     No Known Problems Paternal Grandmother     No Known Problems Paternal Grandfather     No Known Problems Maternal Aunt     No Known Problems Maternal Aunt     No Known Problems Maternal Aunt     No Known Problems Maternal Aunt     No Known Problems Maternal Aunt     No Known Problems Paternal Aunt     No Known Problems Paternal Aunt     Hypertension Family     Neuropathy Family     Stroke Family     Thyroid disease Family      Social History:  Social History     Substance and Sexual Activity   Alcohol Use Yes    Alcohol/week: 2.0 standard drinks of alcohol    Types: 2 Cans of beer per week    Comment: beer     Social History     Substance and Sexual Activity   Drug Use No     Social History     Tobacco Use   Smoking Status Every Day    Current packs/day: 0.50    Average packs/day: 0.5 packs/day for 30.0 years (15.0 ttl pk-yrs)    Types: Cigarettes   Smokeless Tobacco Never           ROS:  General: Per HPI  Skin: Negative, except if noted below  HEENT: Negative  Respiratory: Negative  Cardiovascular: Negative  Gastrointestinal: Negative  Urinary: Negative  Vascular: Negative  Musculoskeletal: Positive per HPI   Neurologic: Positive per HPI  Endocrine: Negative    Objective:  BP Readings from Last 1 Encounters:   07/12/24 107/67      Wt Readings from Last 1 Encounters:   07/12/24 91.6 kg (202 lb)        Respiratory:   non-labored respirations    Lymphatics:  no palpable lymph nodes    Gait:   Antalgic     Neurologic:   Alert and oriented times 3  Patient with normal sensation except as noted below  Deep tendon reflexes 2+ except as noted in MSK  "exam    Bilateral Lower Extremity:  Left Knee:      Inspection:  skin intact    Overall limb alignment varus    Effusion: moderate    ROM 3-115 with pain    Extensor Lag: none    Palpation: mild medial Joint line tenderness to palpation    AP Stability at 90 deg stable    M/L stability in full extension stable    M/L stability in midflexion stable    Motor: 5/5 Q/HS/TA/GS/P    Pulses: 2+ DP / 2+ PT    SILT DP/SP/S/S/TN    Imaging:  My interpretation XR AP scanogram/AP bilateral knee/lateral/davila/sunrise left knee: moderate medial joint space narrowing, subchondral sclerosis, subchondral cysts, osteophyte formation. No fracture or dislocation.     BMI:   Estimated body mass index is 38.17 kg/m² as calculated from the following:    Height as of this encounter: 5' 1\" (1.549 m).    Weight as of this encounter: 91.6 kg (202 lb).  BSA:   Estimated body surface area is 1.9 meters squared as calculated from the following:    Height as of this encounter: 5' 1\" (1.549 m).    Weight as of this encounter: 91.6 kg (202 lb).         Large joint arthrocentesis: L knee  Universal Protocol:  Consent: Verbal consent obtained.  Consent given by: patient  Time out: Immediately prior to procedure a \"time out\" was called to verify the correct patient, procedure, equipment, support staff and site/side marked as required.  Timeout called at: 7/12/2024 1:39 PM.  Patient understanding: patient states understanding of the procedure being performed  Site marked: the operative site was marked  Required items: required blood products, implants, devices, and special equipment available  Patient identity confirmed: verbally with patient  Supporting Documentation  Indications: pain and joint swelling   Procedure Details  Location: knee - L knee  Preparation: Patient was prepped and draped in the usual sterile fashion  Needle size: 22 G  Ultrasound guidance: no  Approach: anterolateral  Medications administered: 4 mL lidocaine 1 %; 40 mg " triamcinolone acetonide 40 mg/mL    Aspirate amount: 25 mL  Aspirate: clear, serous and yellow  Patient tolerance: patient tolerated the procedure well with no immediate complications  Dressing:  Sterile dressing applied          Scribe Attestation      I,:  Sary Do am acting as a scribe while in the presence of the attending physician.:       I,:  Alexi Aguirre DO personally performed the services described in this documentation    as scribed in my presence.:

## 2024-07-14 RX ORDER — TRIAMCINOLONE ACETONIDE 40 MG/ML
40 INJECTION, SUSPENSION INTRA-ARTICULAR; INTRAMUSCULAR
Status: COMPLETED | OUTPATIENT
Start: 2024-07-12 | End: 2024-07-12

## 2024-07-14 RX ORDER — LIDOCAINE HYDROCHLORIDE 10 MG/ML
4 INJECTION, SOLUTION INFILTRATION; PERINEURAL
Status: COMPLETED | OUTPATIENT
Start: 2024-07-12 | End: 2024-07-12

## 2024-07-19 DIAGNOSIS — E11.9 CONTROLLED TYPE 2 DIABETES MELLITUS WITHOUT COMPLICATION, WITHOUT LONG-TERM CURRENT USE OF INSULIN (HCC): ICD-10-CM

## 2024-07-19 RX ORDER — TIRZEPATIDE 5 MG/.5ML
5 INJECTION, SOLUTION SUBCUTANEOUS WEEKLY
Qty: 6 ML | Refills: 2 | Status: SHIPPED | OUTPATIENT
Start: 2024-07-19 | End: 2024-10-17

## 2024-08-27 ENCOUNTER — TELEPHONE (OUTPATIENT)
Dept: GYNECOLOGIC ONCOLOGY | Facility: CLINIC | Age: 64
End: 2024-08-27

## 2024-08-27 ENCOUNTER — HOSPITAL ENCOUNTER (OUTPATIENT)
Dept: MAMMOGRAPHY | Facility: IMAGING CENTER | Age: 64
Discharge: HOME | End: 2024-08-27
Payer: COMMERCIAL

## 2024-08-27 VITALS — WEIGHT: 201 LBS | HEIGHT: 61 IN | BODY MASS INDEX: 37.95 KG/M2

## 2024-08-27 DIAGNOSIS — Z12.31 ENCOUNTER FOR SCREENING MAMMOGRAM FOR MALIGNANT NEOPLASM OF BREAST: ICD-10-CM

## 2024-08-27 PROCEDURE — 77063 BREAST TOMOSYNTHESIS BI: CPT

## 2024-08-27 PROCEDURE — 77067 SCR MAMMO BI INCL CAD: CPT

## 2024-08-27 NOTE — TELEPHONE ENCOUNTER
I spoke with the patient in regards to rescheduling the appointment with Janneth Khan PA-C that was cancelled via sezmit for 8/29/2024. The patient declined to reschedule the appointment at this time.

## 2024-09-04 DIAGNOSIS — E03.8 OTHER SPECIFIED HYPOTHYROIDISM: ICD-10-CM

## 2024-09-05 RX ORDER — LEVOTHYROXINE SODIUM 175 UG/1
TABLET ORAL
Qty: 90 TABLET | Refills: 0 | Status: SHIPPED | OUTPATIENT
Start: 2024-09-05

## 2024-09-16 ENCOUNTER — APPOINTMENT (OUTPATIENT)
Dept: LAB | Facility: HOSPITAL | Age: 64
End: 2024-09-16
Payer: COMMERCIAL

## 2024-09-16 DIAGNOSIS — R80.9 MICROALBUMINURIA DUE TO TYPE 2 DIABETES MELLITUS  (HCC): ICD-10-CM

## 2024-09-16 DIAGNOSIS — E11.9 CONTROLLED TYPE 2 DIABETES MELLITUS WITHOUT COMPLICATION, WITHOUT LONG-TERM CURRENT USE OF INSULIN (HCC): ICD-10-CM

## 2024-09-16 DIAGNOSIS — E11.29 MICROALBUMINURIA DUE TO TYPE 2 DIABETES MELLITUS  (HCC): ICD-10-CM

## 2024-09-16 LAB
ALBUMIN SERPL BCG-MCNC: 4.6 G/DL (ref 3.5–5)
ALP SERPL-CCNC: 71 U/L (ref 34–104)
ALT SERPL W P-5'-P-CCNC: 21 U/L (ref 7–52)
ANION GAP SERPL CALCULATED.3IONS-SCNC: 6 MMOL/L (ref 4–13)
AST SERPL W P-5'-P-CCNC: 18 U/L (ref 13–39)
BILIRUB SERPL-MCNC: 0.3 MG/DL (ref 0.2–1)
BUN SERPL-MCNC: 22 MG/DL (ref 5–25)
CALCIUM SERPL-MCNC: 9.8 MG/DL (ref 8.4–10.2)
CHLORIDE SERPL-SCNC: 108 MMOL/L (ref 96–108)
CHOLEST SERPL-MCNC: 167 MG/DL
CO2 SERPL-SCNC: 27 MMOL/L (ref 21–32)
CREAT SERPL-MCNC: 0.74 MG/DL (ref 0.6–1.3)
CREAT UR-MCNC: 68.8 MG/DL
ERYTHROCYTE [DISTWIDTH] IN BLOOD BY AUTOMATED COUNT: 13.2 % (ref 11.6–15.1)
EST. AVERAGE GLUCOSE BLD GHB EST-MCNC: 120 MG/DL
GFR SERPL CREATININE-BSD FRML MDRD: 87 ML/MIN/1.73SQ M
GLUCOSE P FAST SERPL-MCNC: 82 MG/DL (ref 65–99)
HBA1C MFR BLD: 5.8 %
HCT VFR BLD AUTO: 47 % (ref 34.8–46.1)
HDLC SERPL-MCNC: 45 MG/DL
HGB BLD-MCNC: 15.3 G/DL (ref 11.5–15.4)
LDLC SERPL CALC-MCNC: 86 MG/DL (ref 0–100)
MCH RBC QN AUTO: 31.7 PG (ref 26.8–34.3)
MCHC RBC AUTO-ENTMCNC: 32.6 G/DL (ref 31.4–37.4)
MCV RBC AUTO: 97 FL (ref 82–98)
MICROALBUMIN UR-MCNC: 24 MG/L
MICROALBUMIN/CREAT 24H UR: 35 MG/G CREATININE (ref 0–30)
PLATELET # BLD AUTO: 275 THOUSANDS/UL (ref 149–390)
PMV BLD AUTO: 11.2 FL (ref 8.9–12.7)
POTASSIUM SERPL-SCNC: 4.3 MMOL/L (ref 3.5–5.3)
PROT SERPL-MCNC: 8.4 G/DL (ref 6.4–8.4)
RBC # BLD AUTO: 4.83 MILLION/UL (ref 3.81–5.12)
SODIUM SERPL-SCNC: 141 MMOL/L (ref 135–147)
T4 FREE SERPL-MCNC: 0.7 NG/DL (ref 0.61–1.12)
TRIGL SERPL-MCNC: 178 MG/DL
TSH SERPL DL<=0.05 MIU/L-ACNC: 12.75 UIU/ML (ref 0.45–4.5)
WBC # BLD AUTO: 7.13 THOUSAND/UL (ref 4.31–10.16)

## 2024-09-16 PROCEDURE — 80061 LIPID PANEL: CPT

## 2024-09-16 PROCEDURE — 80053 COMPREHEN METABOLIC PANEL: CPT

## 2024-09-16 PROCEDURE — 85027 COMPLETE CBC AUTOMATED: CPT

## 2024-09-16 PROCEDURE — 83036 HEMOGLOBIN GLYCOSYLATED A1C: CPT

## 2024-09-16 PROCEDURE — 36415 COLL VENOUS BLD VENIPUNCTURE: CPT

## 2024-09-16 PROCEDURE — 82043 UR ALBUMIN QUANTITATIVE: CPT

## 2024-09-16 PROCEDURE — 84443 ASSAY THYROID STIM HORMONE: CPT

## 2024-09-16 PROCEDURE — 84439 ASSAY OF FREE THYROXINE: CPT

## 2024-09-16 PROCEDURE — 82570 ASSAY OF URINE CREATININE: CPT

## 2024-09-20 PROBLEM — M17.12 OSTEOARTHRITIS OF LEFT KNEE: Status: ACTIVE | Noted: 2024-09-20

## 2024-11-10 DIAGNOSIS — E11.9 CONTROLLED TYPE 2 DIABETES MELLITUS WITHOUT COMPLICATION, WITHOUT LONG-TERM CURRENT USE OF INSULIN (HCC): ICD-10-CM

## 2024-11-11 RX ORDER — TIRZEPATIDE 5 MG/.5ML
1 INJECTION, SOLUTION SUBCUTANEOUS WEEKLY
Qty: 4 ML | Refills: 0 | Status: SHIPPED | OUTPATIENT
Start: 2024-11-11

## 2024-11-20 ENCOUNTER — TELEPHONE (OUTPATIENT)
Dept: GYNECOLOGIC ONCOLOGY | Facility: CLINIC | Age: 64
End: 2024-11-20

## 2024-11-20 NOTE — TELEPHONE ENCOUNTER
I spoke with the patient and rescheduled the 1 year follow up appointment that was cancelled for 8/29/2024 at 9:00AM to 1/30/2025 at 11:40AM in [facility] per the patient's request.

## 2024-12-05 DIAGNOSIS — E03.8 OTHER SPECIFIED HYPOTHYROIDISM: ICD-10-CM

## 2024-12-05 RX ORDER — LEVOTHYROXINE SODIUM 175 UG/1
175 TABLET ORAL DAILY
Qty: 90 TABLET | Refills: 1 | Status: SHIPPED | OUTPATIENT
Start: 2024-12-05

## 2024-12-09 DIAGNOSIS — E11.9 CONTROLLED TYPE 2 DIABETES MELLITUS WITHOUT COMPLICATION, WITHOUT LONG-TERM CURRENT USE OF INSULIN (HCC): ICD-10-CM

## 2024-12-11 ENCOUNTER — TELEPHONE (OUTPATIENT)
Dept: GYNECOLOGIC ONCOLOGY | Facility: CLINIC | Age: 64
End: 2024-12-11

## 2024-12-11 RX ORDER — TIRZEPATIDE 5 MG/.5ML
INJECTION, SOLUTION SUBCUTANEOUS
Qty: 4 ML | Refills: 5 | Status: SHIPPED | OUTPATIENT
Start: 2024-12-11

## 2024-12-11 NOTE — TELEPHONE ENCOUNTER
Spoke with the pt about her appointment on 1/30/25 with Janneth Khan. Explained to the pt that Vicky's schedule has to be closed.    Pt was rescheduled for 1/23/25 at the same location with Janneth.     Pt understood the new date and time.

## 2024-12-24 DIAGNOSIS — E78.5 DYSLIPIDEMIA: ICD-10-CM

## 2024-12-24 RX ORDER — PRAVASTATIN SODIUM 20 MG
20 TABLET ORAL DAILY
Qty: 90 TABLET | Refills: 1 | Status: SHIPPED | OUTPATIENT
Start: 2024-12-24

## 2025-01-23 ENCOUNTER — OFFICE VISIT (OUTPATIENT)
Age: 65
End: 2025-01-23
Payer: COMMERCIAL

## 2025-01-23 VITALS
HEART RATE: 96 BPM | OXYGEN SATURATION: 97 % | RESPIRATION RATE: 16 BRPM | BODY MASS INDEX: 39.5 KG/M2 | TEMPERATURE: 98.1 F | WEIGHT: 209.2 LBS | DIASTOLIC BLOOD PRESSURE: 92 MMHG | SYSTOLIC BLOOD PRESSURE: 146 MMHG | HEIGHT: 61 IN

## 2025-01-23 DIAGNOSIS — Z08 ENCOUNTER FOR FOLLOW-UP SURVEILLANCE OF ENDOMETRIAL CANCER: Primary | ICD-10-CM

## 2025-01-23 DIAGNOSIS — Z85.42 ENCOUNTER FOR FOLLOW-UP SURVEILLANCE OF ENDOMETRIAL CANCER: Primary | ICD-10-CM

## 2025-01-23 DIAGNOSIS — Z85.42 HISTORY OF ENDOMETRIAL CANCER: ICD-10-CM

## 2025-01-23 PROCEDURE — 99459 PELVIC EXAMINATION: CPT | Performed by: PHYSICIAN ASSISTANT

## 2025-01-23 PROCEDURE — 99212 OFFICE O/P EST SF 10 MIN: CPT | Performed by: PHYSICIAN ASSISTANT

## 2025-01-23 RX ORDER — BIMEKIZUMAB 160 MG/ML
INJECTION, SOLUTION SUBCUTANEOUS
COMMUNITY
Start: 2024-12-27

## 2025-01-23 NOTE — ASSESSMENT & PLAN NOTE
History of stage IA, grade 1 endometrial cancer s/p surgical resection in June 2020. She is clinically without evidence of disease recurrence.     Return to the office in 1 year for continued endometrial cancer surveillance.     Screening mammogram provided by PCP.

## 2025-01-23 NOTE — PROGRESS NOTES
Name: Giovana Waldron      : 1960      MRN: [id number]  Encounter Provider: Janneth Khan PA-C  Encounter Date: 2025   Encounter department: [facility] GYNECOLOGY ONCOLOGY [facility]  :  Assessment & Plan  Encounter for follow-up surveillance of endometrial cancer         History of endometrial cancer  History of stage IA, grade 1 endometrial cancer s/p surgical resection in 2020. She is clinically without evidence of disease recurrence.     Return to the office in 1 year for continued endometrial cancer surveillance.     Screening mammogram provided by PCP.                History of Present Illness     Reason for Visit / CC:  Survivorship / Surveillance Visit    Giovana Waldron is a 64 y.o. female   who has no new complaints today. No vaginal bleeding, abdominal/pelvic pain. Normal bowel and bladder function. No interval change in medical history since last visit. Quality of life is good.         Oncology History   Cancer Staging   History of endometrial cancer  Staging form: Corpus Uteri - Carcinoma, AJCC 8th Edition  - Clinical: FIGO Stage IA - Signed by Janneth Khan PA-C on 6/10/2020  Histologic grade (G): G1  Histologic grading system: 3 grade system  Oncology History   History of endometrial cancer   2020 Surgery    Robotic assisted TLH, BSO, sentinel LND and cystoscopy   A. Grade 1  B. No myometrial invasion, no LVSI  C. 2 negative lymph nodes  D. IHC, all proteins intact.     6/10/2020 -  Cancer Staged    Staging form: Corpus Uteri - Carcinoma, AJCC 8th Edition  - Clinical: FIGO Stage IA - Signed by Janneth Khan PA-C on 6/10/2020  Histologic grade (G): G1  Histologic grading system: 3 grade system          Review of Systems   Constitutional: Negative.    HENT: Negative.     Eyes: Negative.    Respiratory: Negative.     Cardiovascular: Negative.    Gastrointestinal: Negative.    Genitourinary: Negative.    Musculoskeletal: Negative.    Skin:  "Negative.    Neurological: Negative.    Psychiatric/Behavioral: Negative.      A complete review of systems is negative other than that noted above in the HPI.  Medical History Reviewed by provider this encounter:  Tobacco  Allergies  Meds  Problems  Med Hx  Surg Hx  Fam Hx     .  Current Outpatient Medications on File Prior to Visit   Medication Sig Dispense Refill   • albuterol (PROVENTIL HFA,VENTOLIN HFA) 90 mcg/act inhaler Inhale 2 puffs every 4 (four) hours as needed for wheezing or shortness of breath 18 g 0   • Bimzelx 160 MG/ML SOAJ      • cholecalciferol (VITAMIN D3) 1,000 units tablet Take 1 tablet by mouth daily     • fenofibrate micronized (LOFIBRA) 200 MG capsule Take 1 capsule (200 mg total) by mouth daily 90 capsule 1   • levothyroxine 175 mcg tablet Take 1 tablet by mouth once daily 90 tablet 1   • lisinopril (ZESTRIL) 30 mg tablet Take 1 tablet by mouth once daily 30 tablet 5   • Mounjaro 5 MG/0.5ML SOAJ INJECT CONTENTS OF 1 SYRINGE SUBCUTANEOUSLY ONCE A WEEK 4 mL 5   • pravastatin (PRAVACHOL) 20 mg tablet Take 1 tablet by mouth once daily 90 tablet 1   • Taltz 80 MG/ML SOAJ      • triamcinolone (KENALOG) 0.1 % cream APPLY TO AFFECTED AREA(S) TWICE DAILY AS NEEDED       No current facility-administered medications on file prior to visit.         Objective   /92 (BP Location: Left arm, Patient Position: Sitting, Cuff Size: Large)   Pulse 96   Temp 98.1 °F (36.7 °C) (Temporal)   Resp 16   Ht 5' 1\" (1.549 m)   Wt 94.9 kg (209 lb 3.2 oz)   LMP 04/10/2020   SpO2 97%   BMI 39.53 kg/m²     Body mass index is 39.53 kg/m².  Pain Screening:  Pain Score: 0-No pain    Physical Exam  Vitals reviewed. Exam conducted with a chaperone present.   Constitutional:       General: She is not in acute distress.     Appearance: Normal appearance. She is not ill-appearing.   HENT:      Head: Normocephalic and atraumatic.      Mouth/Throat:      Mouth: Mucous membranes are moist.   Eyes:      General:   "       Right eye: No discharge.         Left eye: No discharge.      Conjunctiva/sclera: Conjunctivae normal.   Pulmonary:      Effort: Pulmonary effort is normal.   Abdominal:      Palpations: Abdomen is soft. There is no mass.      Tenderness: There is no abdominal tenderness.      Hernia: No hernia is present.   Genitourinary:     Comments: The external female genitalia is normal. The bartholin's, uretheral and skenes glands are normal. The urethral meatus is normal (midline with no lesions). Anus without fissure or lesion. Speculum exam reveals a grossly normal vagina. No masses, lesions,discharge or bleeding. No significant cystocele or rectocele noted. Bimanual exam notes a surgical absent cervix, uterus and adnexal structures. No masses or fullness. Bladder is without fullness, mass or tenderness.  Musculoskeletal:      Right lower leg: No edema.      Left lower leg: No edema.   Skin:     General: Skin is warm and dry.      Coloration: Skin is not jaundiced.      Findings: No rash.   Neurological:      General: No focal deficit present.      Mental Status: She is alert and oriented to person, place, and time.      Cranial Nerves: No cranial nerve deficit.      Sensory: No sensory deficit.      Motor: No weakness.      Gait: Gait normal.   Psychiatric:         Mood and Affect: Mood normal.         Behavior: Behavior normal.         Thought Content: Thought content normal.         Judgment: Judgment normal.

## 2025-01-28 ENCOUNTER — TELEPHONE (OUTPATIENT)
Age: 65
End: 2025-01-28

## 2025-01-28 ENCOUNTER — PREP FOR PROCEDURE (OUTPATIENT)
Age: 65
End: 2025-01-28

## 2025-01-28 DIAGNOSIS — Z12.11 SCREENING FOR COLON CANCER: Primary | ICD-10-CM

## 2025-01-28 NOTE — TELEPHONE ENCOUNTER
25  Screened by: Dania Cisneros    Referring Provider Dr Beth    Pre- Screenin' 1 209 BMI 39.53    Has patient been referred for a routine screening Colonoscopy? no  Is the patient between 45-75 years old? no      Previous Colonoscopy yes   If yes:    Date: 10 years    Facility:     Reason:         Does the patient want to see a Gastroenterologist prior to their procedure OR are they having any GI symptoms? no    Has the patient been hospitalized or had abdominal surgery in the past 6 months? no    Does the patient use supplemental oxygen? no    Does the patient take Coumadin, Lovenox, Plavix, Elliquis, Xarelto, or other blood thinning medication? no    Has the patient had a stroke, cardiac event, or stent placed in the past year? no      If patient is between 45yrs - 49yrs, please advise patient that we will have to confirm benefits & coverage with their insurance company for a routine screening colonoscopy.

## 2025-01-28 NOTE — LETTER
Attached are your prep instructions for your upcoming procedure on 2/20/25. If you have any questions or concerns please contact us at [phone].    Thank you,     [facility]

## 2025-01-28 NOTE — TELEPHONE ENCOUNTER
Scheduled date of colonoscopy (as of today):2/20/25    Physician performing colonoscopy:Dr Beth    Location of colonoscopy:[facility]    Bowel prep reviewed with patient:miralax/dulcolax    Instructions reviewed with patient by:prep instructions sent via [facility]    Clearances: N/A

## 2025-02-06 ENCOUNTER — ANESTHESIA (OUTPATIENT)
Dept: ANESTHESIOLOGY | Facility: AMBULATORY SURGERY CENTER | Age: 65
End: 2025-02-06

## 2025-02-06 ENCOUNTER — ANESTHESIA EVENT (OUTPATIENT)
Dept: ANESTHESIOLOGY | Facility: AMBULATORY SURGERY CENTER | Age: 65
End: 2025-02-06

## 2025-02-07 ENCOUNTER — TELEPHONE (OUTPATIENT)
Dept: GASTROENTEROLOGY | Facility: CLINIC | Age: 65
End: 2025-02-07

## 2025-02-19 ENCOUNTER — ANESTHESIA (OUTPATIENT)
Dept: ANESTHESIOLOGY | Facility: AMBULATORY SURGERY CENTER | Age: 65
End: 2025-02-19

## 2025-02-19 ENCOUNTER — ANESTHESIA EVENT (OUTPATIENT)
Dept: ANESTHESIOLOGY | Facility: AMBULATORY SURGERY CENTER | Age: 65
End: 2025-02-19

## 2025-02-20 ENCOUNTER — ANESTHESIA (OUTPATIENT)
Dept: GASTROENTEROLOGY | Facility: AMBULATORY SURGERY CENTER | Age: 65
End: 2025-02-20

## 2025-02-20 ENCOUNTER — ANESTHESIA EVENT (OUTPATIENT)
Dept: GASTROENTEROLOGY | Facility: AMBULATORY SURGERY CENTER | Age: 65
End: 2025-02-20

## 2025-02-20 ENCOUNTER — HOSPITAL ENCOUNTER (OUTPATIENT)
Dept: GASTROENTEROLOGY | Facility: AMBULATORY SURGERY CENTER | Age: 65
Discharge: HOME | End: 2025-02-20
Payer: COMMERCIAL

## 2025-02-20 VITALS
HEART RATE: 74 BPM | TEMPERATURE: 97.6 F | HEIGHT: 61 IN | OXYGEN SATURATION: 100 % | SYSTOLIC BLOOD PRESSURE: 93 MMHG | RESPIRATION RATE: 20 BRPM | DIASTOLIC BLOOD PRESSURE: 51 MMHG | BODY MASS INDEX: 39.46 KG/M2 | WEIGHT: 209 LBS

## 2025-02-20 DIAGNOSIS — Z12.11 SCREENING FOR COLON CANCER: ICD-10-CM

## 2025-02-20 PROCEDURE — 45380 COLONOSCOPY AND BIOPSY: CPT | Performed by: INTERNAL MEDICINE

## 2025-02-20 PROCEDURE — 88305 TISSUE EXAM BY PATHOLOGIST: CPT | Performed by: PATHOLOGY

## 2025-02-20 RX ORDER — PROPOFOL 10 MG/ML
INJECTION, EMULSION INTRAVENOUS AS NEEDED
Status: DISCONTINUED | OUTPATIENT
Start: 2025-02-20 | End: 2025-02-20

## 2025-02-20 RX ORDER — SODIUM CHLORIDE, SODIUM LACTATE, POTASSIUM CHLORIDE, CALCIUM CHLORIDE 600; 310; 30; 20 MG/100ML; MG/100ML; MG/100ML; MG/100ML
50 INJECTION, SOLUTION INTRAVENOUS CONTINUOUS
Status: DISCONTINUED | OUTPATIENT
Start: 2025-02-20 | End: 2025-02-24 | Stop reason: HOSPADM

## 2025-02-20 RX ADMIN — PROPOFOL 50 MG: 10 INJECTION, EMULSION INTRAVENOUS at 07:26

## 2025-02-20 RX ADMIN — PROPOFOL 50 MG: 10 INJECTION, EMULSION INTRAVENOUS at 07:37

## 2025-02-20 RX ADMIN — PROPOFOL 130 MG: 10 INJECTION, EMULSION INTRAVENOUS at 07:22

## 2025-02-20 RX ADMIN — PROPOFOL 50 MG: 10 INJECTION, EMULSION INTRAVENOUS at 07:31

## 2025-02-20 RX ADMIN — SODIUM CHLORIDE, SODIUM LACTATE, POTASSIUM CHLORIDE, CALCIUM CHLORIDE 50 ML/HR: 600; 310; 30; 20 INJECTION, SOLUTION INTRAVENOUS at 07:03

## 2025-02-20 NOTE — ANESTHESIA POSTPROCEDURE EVALUATION
Post-Op Assessment Note    CV Status:  Stable  Pain Score: 0    Pain management: adequate       Mental Status:  Alert and awake   Hydration Status:  Euvolemic   PONV Controlled:  Controlled   Airway Patency:  Patent     Post Op Vitals Reviewed: Yes    No anethesia notable event occurred.    Staff: Anesthesiologist, CRNA           Last Filed PACU Vitals:  Vitals Value Taken Time   Temp     Pulse 65    BP 88/48    Resp 18    SpO2 95

## 2025-02-20 NOTE — ANESTHESIA PREPROCEDURE EVALUATION
Procedure:  COLONOSCOPY    Relevant Problems   CARDIO   (+) Essential hypertension   (+) Essential hypertriglyceridemia      ENDO   (+) Controlled type 2 diabetes mellitus without complication, without long-term current use of insulin (HCC) (OFF Mounjaro 1 week)   (+) Other specified hypothyroidism      /RENAL   (+) Microalbuminuria due to type 2 diabetes mellitus  (HCC)      MUSCULOSKELETAL   (+) Osteoarthritis of left knee      PULMONARY   (-) Smoking   (-) URI (upper respiratory infection)      Behavioral Health   (+) Tobacco abuse        Physical Exam    Airway    Mallampati score: II  TM Distance: >3 FB  Neck ROM: full     Dental   No notable dental hx     Cardiovascular      Pulmonary      Other Findings  post-pubertal.      Anesthesia Plan  ASA Score- 3     Anesthesia Type- IV sedation with anesthesia with ASA Monitors.         Additional Monitors:     Airway Plan:            Plan Factors-    Chart reviewed.    Patient summary reviewed.    Patient is a current smoker.  Patient did not smoke on day of surgery.            Induction- intravenous.    Postoperative Plan-         Informed Consent- Anesthetic plan and risks discussed with patient.  I personally reviewed this patient with the CRNA. Discussed and agreed on the Anesthesia Plan with the CRNA..      NPO Status:  No vitals data found for the desired time range.

## 2025-02-20 NOTE — ANESTHESIA POSTPROCEDURE EVALUATION
Post-Op Assessment Note    CV Status:  Stable  Pain Score: 0    Pain management: adequate       Mental Status:  Alert and awake   Hydration Status:  Euvolemic   PONV Controlled:  Controlled   Airway Patency:  Patent     Post Op Vitals Reviewed: Yes    No anethesia notable event occurred.    Staff: Anesthesiologist           Last Filed PACU Vitals:  Vitals Value Taken Time   Temp     Pulse 74 02/20/25 0816   BP 93/51 02/20/25 0816   Resp 20 02/20/25 0816   SpO2 100 % 02/20/25 0803       Modified Garima:     Vitals Value Taken Time   Activity 2 02/20/25 0805   Respiration 2 02/20/25 0805   Circulation 2 02/20/25 0805   Consciousness 2 02/20/25 0805   Oxygen Saturation 2 02/20/25 0805     Modified Garima Score: 10

## 2025-02-20 NOTE — H&P
History and Physical - [facility]  Giovana Waldron 64 y.o. female MRN: [id number]    HPI: Giovana Waldron is a 64 y.o. year old female who presents for screening colonoscopy    REVIEW OF SYSTEMS: Per the HPI, and otherwise unremarkable.    Historical Information   Past Medical History:   Diagnosis Date    Anxiety     Diabetes mellitus (HCC)     Disease of thyroid gland     Endometrial cancer (HCC)     Hypertension     PMB (postmenopausal bleeding)     for hysterectomy today 2020-cancer dx    Psoriasis     Tobacco abuse      Past Surgical History:   Procedure Laterality Date    CARPAL TUNNEL RELEASE Bilateral      SECTION, LOW TRANSVERSE      CHOLECYSTECTOMY      COLONOSCOPY      colo 14 - polyp at sigmoid colon - 6 yr if adenoma or 10 yr if hyperplastic pathology: early hyperplastic changes     CYSTOSCOPY N/A 2020    Procedure: CYSTOSCOPY;  Surgeon: Duke Bell MD;  [facility];  Service: Gynecology Oncology    GALLBLADDER SURGERY      HYSTERECTOMY      LAPAROSCOPY FOR ECTOPIC PREGNANCY      With excision    OOPHORECTOMY Bilateral     MD INJECTION PROCEDURE LYMPHANGIOGRAPHY Bilateral 2020    Procedure: LYMPHOGRAPHY WITH INDOCYANINE GREEN (ICG);  Surgeon: Duke Bell MD;  [facility];  Service: Gynecology Oncology    MD LAPS TOTAL HYSTERECT 250 GM/< W/RMVL TUBE/OVARY N/A 2020    Procedure: ROBOTIC TOTAL HYSTERECTOMY, BSO, PELVIC NODE DISSECTION;  Surgeon: Duke Bell MD;  [facility];  Service: Gynecology Oncology    MD NDSC WRST SURG W/RLS TRANSVRS CARPL LIGM Right 2019    Procedure: RELEASE CARPAL TUNNEL ENDOSCOPIC;  Surgeon: Mainor Limon MD;  [facility];  Service: Orthopedics    MD NDSC WRST SURG W/RLS TRANSVRS CARPL LIGM Left 2019    Procedure: RELEASE CARPAL TUNNEL ENDOSCOPIC;  Surgeon: Mainor Limon MD;  [facility];  Service: Orthopedics    THYROID SURGERY      WRIST  SURGERY       Social History   Social History     Substance and Sexual Activity   Alcohol Use Yes    Alcohol/week: 2.0 standard drinks of alcohol    Types: 2 Cans of beer per week    Comment: beer     Social History     Substance and Sexual Activity   Drug Use No     Social History     Tobacco Use   Smoking Status Every Day    Current packs/day: 0.50    Average packs/day: 0.5 packs/day for 30.0 years (15.0 ttl pk-yrs)    Types: Cigarettes   Smokeless Tobacco Never     Family History   Problem Relation Age of Onset    Asthma Mother     Diabetes Father     Hypertension Father     Lung cancer Father         age unknown    No Known Problems Sister     No Known Problems Sister     No Known Problems Maternal Grandmother     No Known Problems Maternal Grandfather     No Known Problems Paternal Grandmother     No Known Problems Paternal Grandfather     No Known Problems Maternal Aunt     No Known Problems Maternal Aunt     No Known Problems Maternal Aunt     No Known Problems Maternal Aunt     No Known Problems Maternal Aunt     No Known Problems Paternal Aunt     No Known Problems Paternal Aunt     Hypertension Family     Neuropathy Family     Stroke Family     Thyroid disease Family        Meds/Allergies       Current Outpatient Medications:     Bimzelx 160 MG/ML SOAJ    cholecalciferol (VITAMIN D3) 1,000 units tablet    fenofibrate micronized (LOFIBRA) 200 MG capsule    lisinopril (ZESTRIL) 30 mg tablet    Mounjaro 5 MG/0.5ML SOAJ    pravastatin (PRAVACHOL) 20 mg tablet    albuterol (PROVENTIL HFA,VENTOLIN HFA) 90 mcg/act inhaler    levothyroxine 175 mcg tablet    Taltz 80 MG/ML SOAJ    triamcinolone (KENALOG) 0.1 % cream    Current Facility-Administered Medications:     lactated ringers infusion, 50 mL/hr, Intravenous, Continuous, 50 mL/hr at 02/20/25 0703    Allergies   Allergen Reactions    Methimazole Arthralgia     Reaction Date: 01Nov2011; Annotation - [id number]: Joint pain       Objective     /55   Pulse 77  "  Temp 97.6 °F (36.4 °C) (Temporal)   Resp 19   Ht 5' 1\" (1.549 m)   Wt 94.8 kg (209 lb)   LMP 04/10/2020   SpO2 99%   BMI 39.49 kg/m²     PHYSICAL EXAM    Gen: NAD AAOx3  Head: Normocephalic, Atraumatic  CV: S1S2 RRR no m/r/g  CHEST: Clear b/l no c/r/w  ABD: soft, +BS NT/ND  EXT: no edema    ASSESSMENT/PLAN:  This is a 64 y.o. year old female here for colonoscopy, and she is stable and optimized for her procedure.        "

## 2025-02-24 PROCEDURE — 88305 TISSUE EXAM BY PATHOLOGIST: CPT | Performed by: PATHOLOGY

## 2025-02-26 ENCOUNTER — RESULTS FOLLOW-UP (OUTPATIENT)
Dept: GASTROENTEROLOGY | Facility: CLINIC | Age: 65
End: 2025-02-26

## 2025-02-26 NOTE — RESULT ENCOUNTER NOTE
"To: Giovana I White    As expected, only polyp in the colon removed during your colonoscopy was an adenoma, which is \"precancerous\", but completely benign.  The smaller rectal polyps with a type called hyperplastic which are not considered precancerous risk.  Repeat colonoscopy is recommended in 5 years as we discussed.  Best regards,    Pamela Beth MD     "

## 2025-03-17 LAB
LEFT EYE DIABETIC RETINOPATHY: NORMAL
RIGHT EYE DIABETIC RETINOPATHY: NORMAL

## 2025-03-20 DIAGNOSIS — J45.40 MODERATE PERSISTENT ASTHMA WITHOUT COMPLICATION: ICD-10-CM

## 2025-03-20 RX ORDER — ALBUTEROL SULFATE 90 UG/1
2 INHALANT RESPIRATORY (INHALATION) EVERY 4 HOURS PRN
Qty: 18 G | Refills: 5 | Status: SHIPPED | OUTPATIENT
Start: 2025-03-20

## 2025-05-16 DIAGNOSIS — E11.9 CONTROLLED TYPE 2 DIABETES MELLITUS WITHOUT COMPLICATION, WITHOUT LONG-TERM CURRENT USE OF INSULIN (HCC): ICD-10-CM

## 2025-05-16 RX ORDER — TIRZEPATIDE 5 MG/.5ML
1 INJECTION, SOLUTION SUBCUTANEOUS WEEKLY
Qty: 4 ML | Refills: 0 | Status: SHIPPED | OUTPATIENT
Start: 2025-05-16

## 2025-05-30 DIAGNOSIS — E11.29 MICROALBUMINURIA DUE TO TYPE 2 DIABETES MELLITUS  (HCC): ICD-10-CM

## 2025-05-30 DIAGNOSIS — I10 ESSENTIAL HYPERTENSION: Primary | ICD-10-CM

## 2025-05-30 DIAGNOSIS — E55.9 HYPOVITAMINOSIS D: ICD-10-CM

## 2025-05-30 DIAGNOSIS — R80.9 MICROALBUMINURIA DUE TO TYPE 2 DIABETES MELLITUS  (HCC): ICD-10-CM

## 2025-05-30 DIAGNOSIS — E11.9 CONTROLLED TYPE 2 DIABETES MELLITUS WITHOUT COMPLICATION, WITHOUT LONG-TERM CURRENT USE OF INSULIN (HCC): ICD-10-CM

## 2025-05-30 DIAGNOSIS — E03.8 OTHER SPECIFIED HYPOTHYROIDISM: ICD-10-CM

## 2025-05-30 DIAGNOSIS — E78.5 DYSLIPIDEMIA: ICD-10-CM

## 2025-05-30 RX ORDER — LEVOTHYROXINE SODIUM 175 UG/1
175 TABLET ORAL DAILY
Qty: 30 TABLET | Refills: 0 | Status: SHIPPED | OUTPATIENT
Start: 2025-05-30

## 2025-05-30 NOTE — TELEPHONE ENCOUNTER
Please notify patient that their medication was approved for 30 days only and no refills were given. She is overdue for follow up appt and DM labs.  She needs to make an appt and have BW done priot to appt to con't receiving medications.  Order in Epic (10 to 12 hr fast with urine test) TY

## 2025-06-03 ENCOUNTER — APPOINTMENT (OUTPATIENT)
Dept: LAB | Facility: HOSPITAL | Age: 65
End: 2025-06-03
Payer: COMMERCIAL

## 2025-06-03 DIAGNOSIS — E03.8 OTHER SPECIFIED HYPOTHYROIDISM: ICD-10-CM

## 2025-06-03 DIAGNOSIS — E11.9 CONTROLLED TYPE 2 DIABETES MELLITUS WITHOUT COMPLICATION, WITHOUT LONG-TERM CURRENT USE OF INSULIN (HCC): ICD-10-CM

## 2025-06-03 DIAGNOSIS — E55.9 HYPOVITAMINOSIS D: ICD-10-CM

## 2025-06-03 DIAGNOSIS — R80.9 MICROALBUMINURIA DUE TO TYPE 2 DIABETES MELLITUS  (HCC): ICD-10-CM

## 2025-06-03 DIAGNOSIS — I10 ESSENTIAL HYPERTENSION: ICD-10-CM

## 2025-06-03 DIAGNOSIS — E78.5 DYSLIPIDEMIA: ICD-10-CM

## 2025-06-03 DIAGNOSIS — E11.29 MICROALBUMINURIA DUE TO TYPE 2 DIABETES MELLITUS  (HCC): ICD-10-CM

## 2025-06-03 LAB
25(OH)D3 SERPL-MCNC: 31.7 NG/ML (ref 30–100)
ALBUMIN SERPL BCG-MCNC: 4.3 G/DL (ref 3.5–5)
ALP SERPL-CCNC: 73 U/L (ref 34–104)
ALT SERPL W P-5'-P-CCNC: 33 U/L (ref 7–52)
ANION GAP SERPL CALCULATED.3IONS-SCNC: 9 MMOL/L (ref 4–13)
AST SERPL W P-5'-P-CCNC: 27 U/L (ref 13–39)
BASOPHILS # BLD AUTO: 0.09 THOUSANDS/ÂΜL (ref 0–0.1)
BASOPHILS NFR BLD AUTO: 1 % (ref 0–1)
BILIRUB SERPL-MCNC: 0.35 MG/DL (ref 0.2–1)
BUN SERPL-MCNC: 16 MG/DL (ref 5–25)
CALCIUM SERPL-MCNC: 9.3 MG/DL (ref 8.4–10.2)
CHLORIDE SERPL-SCNC: 106 MMOL/L (ref 96–108)
CHOLEST SERPL-MCNC: 145 MG/DL (ref ?–200)
CO2 SERPL-SCNC: 23 MMOL/L (ref 21–32)
CREAT SERPL-MCNC: 0.71 MG/DL (ref 0.6–1.3)
CREAT UR-MCNC: 149.4 MG/DL
EOSINOPHIL # BLD AUTO: 0.76 THOUSAND/ÂΜL (ref 0–0.61)
EOSINOPHIL NFR BLD AUTO: 12 % (ref 0–6)
ERYTHROCYTE [DISTWIDTH] IN BLOOD BY AUTOMATED COUNT: 12.6 % (ref 11.6–15.1)
EST. AVERAGE GLUCOSE BLD GHB EST-MCNC: 120 MG/DL
GFR SERPL CREATININE-BSD FRML MDRD: 91 ML/MIN/1.73SQ M
GLUCOSE P FAST SERPL-MCNC: 84 MG/DL (ref 65–99)
HBA1C MFR BLD: 5.8 %
HCT VFR BLD AUTO: 45.4 % (ref 34.8–46.1)
HDLC SERPL-MCNC: 38 MG/DL
HGB BLD-MCNC: 14.9 G/DL (ref 11.5–15.4)
IMM GRANULOCYTES # BLD AUTO: 0.03 THOUSAND/UL (ref 0–0.2)
IMM GRANULOCYTES NFR BLD AUTO: 1 % (ref 0–2)
LDLC SERPL CALC-MCNC: 82 MG/DL (ref 0–100)
LYMPHOCYTES # BLD AUTO: 2.24 THOUSANDS/ÂΜL (ref 0.6–4.47)
LYMPHOCYTES NFR BLD AUTO: 35 % (ref 14–44)
MCH RBC QN AUTO: 31.4 PG (ref 26.8–34.3)
MCHC RBC AUTO-ENTMCNC: 32.8 G/DL (ref 31.4–37.4)
MCV RBC AUTO: 96 FL (ref 82–98)
MICROALBUMIN UR-MCNC: 50.3 MG/L
MICROALBUMIN/CREAT 24H UR: 34 MG/G CREATININE (ref 0–30)
MONOCYTES # BLD AUTO: 0.88 THOUSAND/ÂΜL (ref 0.17–1.22)
MONOCYTES NFR BLD AUTO: 14 % (ref 4–12)
NEUTROPHILS # BLD AUTO: 2.41 THOUSANDS/ÂΜL (ref 1.85–7.62)
NEUTS SEG NFR BLD AUTO: 37 % (ref 43–75)
NONHDLC SERPL-MCNC: 107 MG/DL
NRBC BLD AUTO-RTO: 0 /100 WBCS
PLATELET # BLD AUTO: 264 THOUSANDS/UL (ref 149–390)
PMV BLD AUTO: 11.1 FL (ref 8.9–12.7)
POTASSIUM SERPL-SCNC: 4 MMOL/L (ref 3.5–5.3)
PROT SERPL-MCNC: 7.9 G/DL (ref 6.4–8.4)
RBC # BLD AUTO: 4.74 MILLION/UL (ref 3.81–5.12)
SODIUM SERPL-SCNC: 138 MMOL/L (ref 135–147)
TRIGL SERPL-MCNC: 124 MG/DL (ref ?–150)
TSH SERPL DL<=0.05 MIU/L-ACNC: 4.08 UIU/ML (ref 0.45–4.5)
WBC # BLD AUTO: 6.41 THOUSAND/UL (ref 4.31–10.16)

## 2025-06-03 PROCEDURE — 85025 COMPLETE CBC W/AUTO DIFF WBC: CPT | Performed by: INTERNAL MEDICINE

## 2025-06-03 PROCEDURE — 82570 ASSAY OF URINE CREATININE: CPT

## 2025-06-03 PROCEDURE — 82306 VITAMIN D 25 HYDROXY: CPT

## 2025-06-03 PROCEDURE — 83036 HEMOGLOBIN GLYCOSYLATED A1C: CPT | Performed by: INTERNAL MEDICINE

## 2025-06-03 PROCEDURE — 80061 LIPID PANEL: CPT | Performed by: INTERNAL MEDICINE

## 2025-06-03 PROCEDURE — 84443 ASSAY THYROID STIM HORMONE: CPT | Performed by: INTERNAL MEDICINE

## 2025-06-03 PROCEDURE — 80053 COMPREHEN METABOLIC PANEL: CPT | Performed by: INTERNAL MEDICINE

## 2025-06-03 PROCEDURE — 36415 COLL VENOUS BLD VENIPUNCTURE: CPT

## 2025-06-03 PROCEDURE — 82043 UR ALBUMIN QUANTITATIVE: CPT

## 2025-06-05 ENCOUNTER — OFFICE VISIT (OUTPATIENT)
Dept: FAMILY MEDICINE CLINIC | Facility: HOSPITAL | Age: 65
End: 2025-06-05
Payer: COMMERCIAL

## 2025-06-05 VITALS
WEIGHT: 203.2 LBS | SYSTOLIC BLOOD PRESSURE: 116 MMHG | TEMPERATURE: 97.2 F | OXYGEN SATURATION: 99 % | BODY MASS INDEX: 38.36 KG/M2 | DIASTOLIC BLOOD PRESSURE: 78 MMHG | HEART RATE: 88 BPM | HEIGHT: 61 IN

## 2025-06-05 DIAGNOSIS — J45.40 MODERATE PERSISTENT ASTHMA WITHOUT COMPLICATION: ICD-10-CM

## 2025-06-05 DIAGNOSIS — E11.29 MICROALBUMINURIA DUE TO TYPE 2 DIABETES MELLITUS  (HCC): ICD-10-CM

## 2025-06-05 DIAGNOSIS — I10 ESSENTIAL HYPERTENSION: ICD-10-CM

## 2025-06-05 DIAGNOSIS — E78.5 DYSLIPIDEMIA: ICD-10-CM

## 2025-06-05 DIAGNOSIS — E55.9 HYPOVITAMINOSIS D: ICD-10-CM

## 2025-06-05 DIAGNOSIS — R80.9 MICROALBUMINURIA DUE TO TYPE 2 DIABETES MELLITUS  (HCC): ICD-10-CM

## 2025-06-05 DIAGNOSIS — E03.8 OTHER SPECIFIED HYPOTHYROIDISM: ICD-10-CM

## 2025-06-05 DIAGNOSIS — Z12.31 ENCOUNTER FOR SCREENING MAMMOGRAM FOR BREAST CANCER: ICD-10-CM

## 2025-06-05 DIAGNOSIS — E11.9 CONTROLLED TYPE 2 DIABETES MELLITUS WITHOUT COMPLICATION, WITHOUT LONG-TERM CURRENT USE OF INSULIN (HCC): Primary | ICD-10-CM

## 2025-06-05 PROCEDURE — 99214 OFFICE O/P EST MOD 30 MIN: CPT | Performed by: INTERNAL MEDICINE

## 2025-06-05 RX ORDER — TIRZEPATIDE 7.5 MG/.5ML
7.5 INJECTION, SOLUTION SUBCUTANEOUS WEEKLY
Qty: 2 ML | Refills: 5 | Status: SHIPPED | OUTPATIENT
Start: 2025-06-05

## 2025-06-05 NOTE — ASSESSMENT & PLAN NOTE
Lab Results   Component Value Date    HGBA1C 5.8 (H) 06/03/2025   Mild elevated Ur micro:Cr, on an ACE, A1C controlled, BP controlled, urged to keep hydrated and avoid NSAIDs, follow annually

## 2025-06-05 NOTE — ASSESSMENT & PLAN NOTE
No recent illnesses or exacerbations over winter mos, mild allergy symptoms but not severe enough she has to take anything for them - urged OTC antihistamine if needed as allergies can trigger asthma, call with resp concerns, recheck in 6 mos

## 2025-06-05 NOTE — ASSESSMENT & PLAN NOTE
Lab Results   Component Value Date    HGBA1C 5.8 (H) 06/03/2025   DM type 2 with microalbuminuria - controlled with A1C 5.8 - con't healthy diet and urged to increase exercise, will increase Mounjaro d/t wgt gain despite healthy diet, urged to watch for GI SE and hypoglycemia - call if they occur, recheck BW in 6 mos - BW order given, DM foot exam done in office today, DM eye exam UTD (3/25), on an ACE and statin, will follow  Orders:    Tirzepatide (Mounjaro) 7.5 MG/0.5ML SOAJ; Inject 7.5 mg under the skin once a week    Hemoglobin A1C; Future    Basic metabolic panel; Future

## 2025-06-05 NOTE — ASSESSMENT & PLAN NOTE
Vit D still low nml - pt thinks she is taking 2000 IU but our list says 1000 IU - she is going to increase to 5000 IU, will recheck levels in 6 mos with labs - BW order given, will follow  Orders:    Vitamin D 25 hydroxy; Future

## 2025-06-05 NOTE — PROGRESS NOTES
Name: Giovana Waldron      : 1960      MRN: [id number]  Encounter Provider: Lucy Ortiz DO  Encounter Date: 2025   Encounter department: [facility] [id number]   :  Assessment & Plan  Controlled type 2 diabetes mellitus without complication, without long-term current use of insulin (HCC)    Lab Results   Component Value Date    HGBA1C 5.8 (H) 2025   DM type 2 with microalbuminuria - controlled with A1C 5.8 - con't healthy diet and urged to increase exercise, will increase Mounjaro d/t wgt gain despite healthy diet, urged to watch for GI SE and hypoglycemia - call if they occur, recheck BW in 6 mos - BW order given, DM foot exam done in office today, DM eye exam UTD (3/25), on an ACE and statin, will follow  Orders:    Tirzepatide (Mounjaro) 7.5 MG/0.5ML SOAJ; Inject 7.5 mg under the skin once a week    Hemoglobin A1C; Future    Basic metabolic panel; Future    Microalbuminuria due to type 2 diabetes mellitus  (HCC)    Lab Results   Component Value Date    HGBA1C 5.8 (H) 2025   Mild elevated Ur micro:Cr, on an ACE, A1C controlled, BP controlled, urged to keep hydrated and avoid NSAIDs, follow annually       Dyslipidemia  LDL at goal, con't current statin, HDL low - urged start formal exercise, recheck FLP annually       Other specified hypothyroidism  TSH upper goal but improved, pt clinically euthyroid, urged proper administration of rx, recheck TSH with labs in 6 mos  Orders:    TSH, 3rd generation with Free T4 reflex; Future    Hypovitaminosis D  Vit D still low nml - pt thinks she is taking 2000 IU but our list says 1000 IU - she is going to increase to 5000 IU, will recheck levels in 6 mos with labs - BW order given, will follow  Orders:    Vitamin D 25 hydroxy; Future    Essential hypertension  BP great, con't current meds, recheck in 6 mos       Moderate persistent asthma without complication  No recent illnesses or exacerbations over winter mos, mild  allergy symptoms but not severe enough she has to take anything for them - urged OTC antihistamine if needed as allergies can trigger asthma, call with resp concerns, recheck in 6 mos       Encounter for screening mammogram for breast cancer    Orders:    Mammo screening bilateral w 3d and cad; Future      Colonoscopy 2/25 - 5 yrs    Mammo 8/24 - order given for 2025    PAP s/p hysterectomy    BW 5/25 (A1C 5.8)  DM foot exam done in office today  DM eye exam 3/25 - 2 yrs  Ur microalbumin:Cr 6/25        Depression Screening and Follow-up Plan: Patient was screened for depression during today's encounter. They screened negative with a PHQ-2 score of 0.      Tobacco Cessation Counseling: Tobacco cessation counseling was provided. The patient is sincerely urged to quit consumption of tobacco. She is not ready to quit tobacco.       History of Present Illness   HPI Pt here for follow up appt and BW results    BW results were d/w pt in detail: FBS/A1C 84/5.8, rest of CMP/CBC/TSH were all wnl, Ur microalbumin:Cr was elevated at 34, Vit D was low nml at 31.7, FLP with HDL 38 but rest of panel was wnl     Def of controlled vs uncontrolled DM was reviewed.  Diet was reviewed - wgt up 2 lbs from June 24.  She is drinking lots of water. She feels her diet is healthy but she is not doing any formal exercise.   She is taking her DM meds as directed.  She is UTD on DM eye exam (3/25) and her foot exam is due now.  She notes no pain/numbness/tingling/sores/ulcer with her feet.  Nml range for Ur microalbumin:Cr ratio was reviewed. She is on statin and ACE.     Goal FLP was d/w pt in detail.  Diet/exercise reviewed as noted above.  She is taking her Pravastatin daily as directed w/o Se.  She notes no stroke/TIA symptoms/CP.      Pt is taking their thyroid medication daily w/o any other meds and prior to eating.  They deny any significant wgt changes/fatigue/C/D/tremor/palp/skin changes.  She notes some thinning hair.    Goal Vit D  "range was reviewed.  Current supplement she is taking was reviewed.  She notes no SE with the supplement.    BP at goal today and meds were reviewed and no changes have occurred.  She denies missing doses of meds or SE with the meds.  She does not check her BP outside the office.  She notes no frequent Ha's/dizziness/double vision/CP.     Pt notes no significant illnesses over the past winter.  She notes no significant issues with allergies. She has asthma but is not on a daily maintenance inhaler. She reports no regular use of her rescue inhaler.     Review of Systems   Constitutional:  Negative for chills, fatigue, fever and unexpected weight change.   HENT:  Negative for congestion and sore throat.    Eyes:  Negative for pain and visual disturbance.   Respiratory:  Negative for cough, shortness of breath and wheezing.    Cardiovascular:  Negative for chest pain, palpitations and leg swelling.   Gastrointestinal:  Negative for abdominal pain, blood in stool, constipation, diarrhea, nausea and vomiting.   Genitourinary:  Negative for difficulty urinating and dysuria.   Musculoskeletal:  Negative for back pain and gait problem.   Skin:  Negative for rash and wound.   Neurological:  Negative for dizziness, light-headedness and headaches.   Hematological:  Does not bruise/bleed easily.   Psychiatric/Behavioral:  Negative for confusion and dysphoric mood.        Objective   /78   Pulse 88   Temp (!) 97.2 °F (36.2 °C)   Ht 5' 1\" (1.549 m)   Wt 92.2 kg (203 lb 3.2 oz)   LMP 04/10/2020   SpO2 99%   BMI 38.39 kg/m²      Physical Exam  Vitals and nursing note reviewed.   Constitutional:       General: She is not in acute distress.     Appearance: She is well-developed. She is obese. She is not ill-appearing.   HENT:      Head: Normocephalic and atraumatic.      Right Ear: Tympanic membrane and external ear normal. There is no impacted cerumen.      Left Ear: External ear normal. There is no impacted cerumen. "     Eyes:      General:         Right eye: No discharge.         Left eye: No discharge.      Conjunctiva/sclera: Conjunctivae normal.     Neck:      Thyroid: No thyromegaly.      Trachea: No tracheal deviation.     Cardiovascular:      Rate and Rhythm: Normal rate and regular rhythm.      Pulses: no weak pulses.           Dorsalis pedis pulses are 2+ on the right side and 2+ on the left side.      Heart sounds: Normal heart sounds. No murmur heard.  Pulmonary:      Effort: Pulmonary effort is normal. No respiratory distress.      Breath sounds: Normal breath sounds. No wheezing, rhonchi or rales.     Musculoskeletal:         General: No deformity or signs of injury.      Cervical back: Neck supple.   Feet:      Right foot:      Skin integrity: Callus present. No ulcer, skin breakdown, erythema, warmth or dry skin.      Left foot:      Skin integrity: Callus present. No ulcer, skin breakdown, erythema, warmth or dry skin.     Skin:     General: Skin is warm and dry.      Coloration: Skin is not pale.      Findings: No rash.     Neurological:      General: No focal deficit present.      Mental Status: She is alert. Mental status is at baseline.      Motor: No abnormal muscle tone.      Gait: Gait normal.     Psychiatric:         Mood and Affect: Mood normal.         Behavior: Behavior normal.         Thought Content: Thought content normal.         Judgment: Judgment normal.     Diabetic Foot Exam    Patient's shoes and socks removed.    Right Foot/Ankle   Right Foot Inspection  Skin Exam: skin normal, skin intact, callus and callus. No dry skin, no warmth, no erythema, no maceration, no abnormal color, no pre-ulcer and no ulcer.     Toe Exam: ROM and strength within normal limits.     Sensory   Vibration: intact  Monofilament testing: intact    Vascular  The right DP pulse is 2+.     Left Foot/Ankle  Left Foot Inspection  Skin Exam: skin normal, skin intact and callus. No dry skin, no warmth, no erythema, no  maceration, normal color, no pre-ulcer and no ulcer.     Toe Exam: ROM and strength within normal limits.     Sensory   Vibration: intact  Monofilament testing: intact    Vascular  The left DP pulse is 2+.     Assign Risk Category  No deformity present  No loss of protective sensation  No weak pulses  Risk: 0

## 2025-06-05 NOTE — ASSESSMENT & PLAN NOTE
TSH upper goal but improved, pt clinically euthyroid, urged proper administration of rx, recheck TSH with labs in 6 mos  Orders:    TSH, 3rd generation with Free T4 reflex; Future

## 2025-06-05 NOTE — ASSESSMENT & PLAN NOTE
LDL at goal, con't current statin, HDL low - urged start formal exercise, recheck FLP annually

## 2025-06-06 ENCOUNTER — TELEPHONE (OUTPATIENT)
Age: 65
End: 2025-06-06

## 2025-06-06 NOTE — TELEPHONE ENCOUNTER
PA for Mounjaro 7.5mg SUBMITTED to [facility]    via    []CMM-KEY:   [x]Surescripts-Case ID #: [id number]  []Availity-Auth ID #   []Faxed to plan   []Other website   []Phone call Case ID #     []PA sent as URGENT    All office notes, labs and other pertaining documents and studies sent. Clinical questions answered. Awaiting determination from insurance company.     Turnaround time for your insurance to make a decision on your Prior Authorization can take 7-21 business days.

## 2025-06-06 NOTE — TELEPHONE ENCOUNTER
PA for Mounjaro 7.5mg APPROVED     Date(s) approved May 7, 2025 to June 6, 2026     Case #: [id number]     Patient advised by          []MyChart Message  [x]Phone call- Patient verbalized understanding  []LMOM  []L/M to call office as no active Communication consent on file  []Unable to leave detailed message as VM not approved on Communication consent       Pharmacy advised by    []Fax  [x]Phone call- Paid claim received  []Secure Chat    Approval letter scanned into Media Yes

## 2025-06-22 DIAGNOSIS — E78.5 DYSLIPIDEMIA: ICD-10-CM

## 2025-06-22 RX ORDER — PRAVASTATIN SODIUM 20 MG
20 TABLET ORAL DAILY
Qty: 90 TABLET | Refills: 1 | Status: SHIPPED | OUTPATIENT
Start: 2025-06-22

## 2025-06-24 DIAGNOSIS — E78.1 ESSENTIAL HYPERTRIGLYCERIDEMIA: ICD-10-CM

## 2025-06-25 RX ORDER — FENOFIBRATE 200 MG/1
200 CAPSULE ORAL DAILY
Qty: 90 CAPSULE | Refills: 1 | Status: SHIPPED | OUTPATIENT
Start: 2025-06-25

## 2025-07-04 DIAGNOSIS — E03.8 OTHER SPECIFIED HYPOTHYROIDISM: ICD-10-CM

## 2025-07-06 RX ORDER — LEVOTHYROXINE SODIUM 175 UG/1
175 TABLET ORAL DAILY
Qty: 30 TABLET | Refills: 5 | Status: SHIPPED | OUTPATIENT
Start: 2025-07-06

## 2025-07-30 ENCOUNTER — NURSE TRIAGE (OUTPATIENT)
Age: 65
End: 2025-07-30

## 2025-07-30 ENCOUNTER — OFFICE VISIT (OUTPATIENT)
Dept: FAMILY MEDICINE CLINIC | Facility: HOSPITAL | Age: 65
End: 2025-07-30
Payer: COMMERCIAL

## 2025-07-30 VITALS
TEMPERATURE: 97.7 F | HEART RATE: 83 BPM | HEIGHT: 61 IN | BODY MASS INDEX: 37.31 KG/M2 | OXYGEN SATURATION: 97 % | WEIGHT: 197.6 LBS | SYSTOLIC BLOOD PRESSURE: 116 MMHG | DIASTOLIC BLOOD PRESSURE: 80 MMHG

## 2025-07-30 DIAGNOSIS — R21 RASH OF BODY: Primary | ICD-10-CM

## 2025-07-30 PROCEDURE — 99213 OFFICE O/P EST LOW 20 MIN: CPT | Performed by: NURSE PRACTITIONER

## 2025-07-30 RX ORDER — BIMEKIZUMAB 320 MG/2ML
320 INJECTION, SOLUTION SUBCUTANEOUS ONCE
COMMUNITY
Start: 2025-07-29

## 2025-07-30 RX ORDER — BETAMETHASONE DIPROPIONATE 0.5 MG/G
CREAM TOPICAL 2 TIMES DAILY
Qty: 50 G | Refills: 0 | Status: SHIPPED | OUTPATIENT
Start: 2025-07-30

## 2025-08-06 DIAGNOSIS — L25.9 CONTACT DERMATITIS, UNSPECIFIED CONTACT DERMATITIS TYPE, UNSPECIFIED TRIGGER: Primary | ICD-10-CM

## 2025-08-06 RX ORDER — PREDNISONE 10 MG/1
TABLET ORAL
Qty: 21 TABLET | Refills: 0 | Status: SHIPPED | OUTPATIENT
Start: 2025-08-06 | End: 2025-08-15

## 2025-08-09 DIAGNOSIS — Z00.6 ENCOUNTER FOR EXAMINATION FOR NORMAL COMPARISON OR CONTROL IN CLINICAL RESEARCH PROGRAM: ICD-10-CM

## (undated) DEVICE — LUBRICANT INST ELECTROLUBE ANTISTK WO PAD

## (undated) DEVICE — GLOVE INDICATOR PI UNDERGLOVE SZ 8 BLUE

## (undated) DEVICE — DRESSING XEROFORM 5 X 9

## (undated) DEVICE — GLOVE SRG BIOGEL 7

## (undated) DEVICE — PREMIUM DRY TRAY LF: Brand: MEDLINE INDUSTRIES, INC.

## (undated) DEVICE — 3000CC GUARDIAN II: Brand: GUARDIAN

## (undated) DEVICE — SPECIMEN TRAP: Brand: ARGYLE

## (undated) DEVICE — ADHESIVE SKIN HIGH VISCOSITY EXOFIN 1ML

## (undated) DEVICE — SYRINGE 50ML LL

## (undated) DEVICE — STERILE BETHLEHEM PLASTIC HAND: Brand: CARDINAL HEALTH

## (undated) DEVICE — NEEDLE 25G X 1 1/2

## (undated) DEVICE — CHLORAPREP HI-LITE 26ML ORANGE

## (undated) DEVICE — GLOVE INDICATOR PI UNDERGLOVE SZ 7.5 BLUE

## (undated) DEVICE — INTENDED FOR TISSUE SEPARATION, AND OTHER PROCEDURES THAT REQUIRE A SHARP SURGICAL BLADE TO PUNCTURE OR CUT.: Brand: BARD-PARKER SAFETY BLADES SIZE 15, STERILE

## (undated) DEVICE — SUT STRATAFIX SPIRAL 2-0 CT-1 30 CM SXPP1B410

## (undated) DEVICE — INTENDED FOR TISSUE SEPARATION, AND OTHER PROCEDURES THAT REQUIRE A SHARP SURGICAL BLADE TO PUNCTURE OR CUT.: Brand: BARD-PARKER SAFETY BLADES SIZE 11, STERILE

## (undated) DEVICE — RETROGRADE KNIFE BOX OF 6: Brand: ECTRA

## (undated) DEVICE — COLUMN DRAPE

## (undated) DEVICE — KIT, BETHLEHEM ROBOTIC PROST: Brand: CARDINAL HEALTH

## (undated) DEVICE — EXIDINE 4 PCT

## (undated) DEVICE — LARGE NEEDLE DRIVER: Brand: ENDOWRIST

## (undated) DEVICE — PADDING CAST 4 IN  COTTON STRL

## (undated) DEVICE — MONOPOLAR CURVED SCISSORS: Brand: ENDOWRIST

## (undated) DEVICE — ACE WRAP 3 IN STERILE

## (undated) DEVICE — GLOVE INDICATOR PI UNDERGLOVE SZ 7 BLUE

## (undated) DEVICE — ARM DRAPE

## (undated) DEVICE — SPONGE PVP SCRUB WING STERILE

## (undated) DEVICE — FENESTRATED BIPOLAR FORCEPS: Brand: ENDOWRIST

## (undated) DEVICE — PROGRASP FORCEPS: Brand: ENDOWRIST

## (undated) DEVICE — TIP COVER ACCESSORY

## (undated) DEVICE — UTERINE MANIPULATOR RUMI 6.7 X 8 CM

## (undated) DEVICE — GLOVE SRG BIOGEL 7.5

## (undated) DEVICE — SUT PROLENE 4-0 PC-3 18 IN 8634G

## (undated) DEVICE — DISPOSABLE EQUIPMENT COVER: Brand: SMALL TOWEL DRAPE

## (undated) DEVICE — SUT VICRYL 0 CT-1 36 IN J946H

## (undated) DEVICE — CUFF TOURNIQUET 18 X 4 IN QUICK CONNECT DISP 1 BLADDER

## (undated) DEVICE — GLOVE PI ULTRA TOUCH SZ.7.5

## (undated) DEVICE — AIRSEAL TUBE SMOKE EVAC LUMENX3 FILTERED

## (undated) DEVICE — TRAY FOLEY 16FR URIMETER SILICONE SURESTEP

## (undated) DEVICE — ADHESIVE SKN CLSR HISTOACRYL FLEX 0.5ML LF

## (undated) DEVICE — CANNULA SEAL

## (undated) DEVICE — STRL COTTON TIP APPLCTR 6IN PK: Brand: CARDINAL HEALTH

## (undated) DEVICE — STERILE COTTON TIPPED APPLICATORS: Brand: PURITAN

## (undated) DEVICE — GAUZE SPONGES,16 PLY: Brand: CURITY

## (undated) DEVICE — SUT MONOCRYL 4-0 PS-2 27 IN Y426H

## (undated) DEVICE — TROCAR: Brand: KII FIOS FIRST ENTRY

## (undated) DEVICE — 1820 FOAM BLOCK NEEDLE COUNTER: Brand: DEVON

## (undated) DEVICE — DRAPE LAPAROTOMY W/POUCHES

## (undated) DEVICE — TROCAR PORT ACCESS 12 X120MM W/BLDLS OPTICAL TIP AIRSEAL